# Patient Record
Sex: MALE | Race: WHITE | Employment: OTHER | URBAN - METROPOLITAN AREA
[De-identification: names, ages, dates, MRNs, and addresses within clinical notes are randomized per-mention and may not be internally consistent; named-entity substitution may affect disease eponyms.]

---

## 2017-02-21 ENCOUNTER — OFFICE VISIT (OUTPATIENT)
Dept: INTERNAL MEDICINE CLINIC | Facility: CLINIC | Age: 67
End: 2017-02-21

## 2017-02-21 VITALS
SYSTOLIC BLOOD PRESSURE: 111 MMHG | TEMPERATURE: 98 F | DIASTOLIC BLOOD PRESSURE: 72 MMHG | BODY MASS INDEX: 31.52 KG/M2 | HEIGHT: 68 IN | HEART RATE: 80 BPM | WEIGHT: 208 LBS

## 2017-02-21 DIAGNOSIS — E66.3 OVERWEIGHT: ICD-10-CM

## 2017-02-21 DIAGNOSIS — I10 ESSENTIAL HYPERTENSION: Primary | ICD-10-CM

## 2017-02-21 DIAGNOSIS — E78.2 MIXED HYPERLIPIDEMIA: ICD-10-CM

## 2017-02-21 PROCEDURE — G0463 HOSPITAL OUTPT CLINIC VISIT: HCPCS | Performed by: INTERNAL MEDICINE

## 2017-02-21 PROCEDURE — 99214 OFFICE O/P EST MOD 30 MIN: CPT | Performed by: INTERNAL MEDICINE

## 2017-02-21 NOTE — PROGRESS NOTES
Carlos Sandra is a 74year old male. HPI:   1. Essential hypertension with goal <140/90    Patient has been following low salt diet and has been taking anti-hypertensive prescriptions as prescribed.  Blood pressure has been checked and is under good control a Packs/Day: 1.00  Years: 35        Types: Cigarettes    Smokeless Status: Former User                       Comment: Quit in 2013    Alcohol Use: Yes           0.0 oz/week       0 Standard drinks or equivalent per week       Comment: 1-2 drinks       REVIE continue with life altering habits to improve weight and health and achieve BMI under 25.     Wt Readings from Last 6 Encounters:  02/21/17 : 208 lb (94.348 kg)  11/25/16 : 210 lb (95.255 kg)  11/17/16 : 208 lb 12.8 oz (94.711 kg)  11/10/16 : 210 lb (95.255

## 2017-06-20 ENCOUNTER — OFFICE VISIT (OUTPATIENT)
Dept: INTERNAL MEDICINE CLINIC | Facility: CLINIC | Age: 67
End: 2017-06-20

## 2017-06-20 VITALS
BODY MASS INDEX: 31.07 KG/M2 | WEIGHT: 205 LBS | DIASTOLIC BLOOD PRESSURE: 69 MMHG | HEIGHT: 68 IN | HEART RATE: 91 BPM | SYSTOLIC BLOOD PRESSURE: 102 MMHG | RESPIRATION RATE: 16 BRPM

## 2017-06-20 DIAGNOSIS — E66.3 OVERWEIGHT: ICD-10-CM

## 2017-06-20 DIAGNOSIS — Z12.5 SCREENING FOR PROSTATE CANCER: ICD-10-CM

## 2017-06-20 DIAGNOSIS — I10 ESSENTIAL HYPERTENSION WITH GOAL BLOOD PRESSURE LESS THAN 140/90: Primary | ICD-10-CM

## 2017-06-20 DIAGNOSIS — E78.2 MIXED HYPERLIPIDEMIA: ICD-10-CM

## 2017-06-20 PROCEDURE — 99214 OFFICE O/P EST MOD 30 MIN: CPT | Performed by: INTERNAL MEDICINE

## 2017-06-20 PROCEDURE — G0463 HOSPITAL OUTPT CLINIC VISIT: HCPCS | Performed by: INTERNAL MEDICINE

## 2017-06-20 NOTE — PROGRESS NOTES
Khloe Cifuentes is a 74year old male. HPI:   1. Essential hypertension with goal <140/90    Patient has been following low salt diet and has been taking anti-hypertensive prescriptions as prescribed.  Blood pressure has been checked and is under good control a (peripheral vascular disease) ([facility] [address]      per NG      Social History:    Smoking Status: Former Smoker                   Packs/Day: 1.00  Years: 35        Types: Cigarettes    Smokeless Status: Former User                       Comment: Quit in 2013    [place] weight. - Lipid Panel [E]; Future  - TSH [E]; Future    3. Overweight    Patient has been overweight for years. Has been attempting to eat less and exercise more to try and control weight.  Has seen some mild success and is encouraged to continue with li

## 2017-09-15 ENCOUNTER — LAB ENCOUNTER (OUTPATIENT)
Dept: LAB | Age: 67
End: 2017-09-15
Attending: INTERNAL MEDICINE
Payer: MEDICARE

## 2017-09-15 DIAGNOSIS — I10 ESSENTIAL HYPERTENSION WITH GOAL BLOOD PRESSURE LESS THAN 140/90: ICD-10-CM

## 2017-09-15 DIAGNOSIS — Z12.5 SCREENING FOR PROSTATE CANCER: ICD-10-CM

## 2017-09-15 DIAGNOSIS — E78.2 MIXED HYPERLIPIDEMIA: ICD-10-CM

## 2017-09-15 LAB
ALBUMIN SERPL BCP-MCNC: 3.8 G/DL (ref 3.5–4.8)
ALBUMIN/GLOB SERPL: 1.2 {RATIO} (ref 1–2)
ALP SERPL-CCNC: 51 U/L (ref 32–100)
ALT SERPL-CCNC: 21 U/L (ref 17–63)
ANION GAP SERPL CALC-SCNC: 7 MMOL/L (ref 0–18)
AST SERPL-CCNC: 18 U/L (ref 15–41)
BACTERIA UR QL AUTO: NEGATIVE /HPF
BASOPHILS # BLD: 0 K/UL (ref 0–0.2)
BASOPHILS NFR BLD: 0 %
BILIRUB SERPL-MCNC: 0.7 MG/DL (ref 0.3–1.2)
BILIRUB UR QL: NEGATIVE
BUN SERPL-MCNC: 13 MG/DL (ref 8–20)
BUN/CREAT SERPL: 14.4 (ref 10–20)
CALCIUM SERPL-MCNC: 8.8 MG/DL (ref 8.5–10.5)
CHLORIDE SERPL-SCNC: 104 MMOL/L (ref 95–110)
CHOLEST SERPL-MCNC: 78 MG/DL (ref 110–200)
CLARITY UR: CLEAR
CO2 SERPL-SCNC: 27 MMOL/L (ref 22–32)
COLOR UR: YELLOW
CREAT SERPL-MCNC: 0.9 MG/DL (ref 0.5–1.5)
EOSINOPHIL # BLD: 0.6 K/UL (ref 0–0.7)
EOSINOPHIL NFR BLD: 7 %
ERYTHROCYTE [DISTWIDTH] IN BLOOD BY AUTOMATED COUNT: 13.6 % (ref 11–15)
GLOBULIN PLAS-MCNC: 3.2 G/DL (ref 2.5–3.7)
GLUCOSE SERPL-MCNC: 98 MG/DL (ref 70–99)
GLUCOSE UR-MCNC: NEGATIVE MG/DL
HCT VFR BLD AUTO: 45.9 % (ref 41–52)
HDLC SERPL-MCNC: 35 MG/DL
HGB BLD-MCNC: 15.6 G/DL (ref 13.5–17.5)
HGB UR QL STRIP.AUTO: NEGATIVE
KETONES UR-MCNC: NEGATIVE MG/DL
LDLC SERPL CALC-MCNC: 28 MG/DL (ref 0–99)
LEUKOCYTE ESTERASE UR QL STRIP.AUTO: NEGATIVE
LYMPHOCYTES # BLD: 2.9 K/UL (ref 1–4)
LYMPHOCYTES NFR BLD: 32 %
MCH RBC QN AUTO: 32.5 PG (ref 27–32)
MCHC RBC AUTO-ENTMCNC: 34.1 G/DL (ref 32–37)
MCV RBC AUTO: 95.4 FL (ref 80–100)
MONOCYTES # BLD: 0.9 K/UL (ref 0–1)
MONOCYTES NFR BLD: 10 %
NEUTROPHILS # BLD AUTO: 4.7 K/UL (ref 1.8–7.7)
NEUTROPHILS NFR BLD: 51 %
NITRITE UR QL STRIP.AUTO: NEGATIVE
NONHDLC SERPL-MCNC: 43 MG/DL
OSMOLALITY UR CALC.SUM OF ELEC: 286 MOSM/KG (ref 275–295)
PH UR: 5 [PH] (ref 5–8)
PLATELET # BLD AUTO: 201 K/UL (ref 140–400)
PMV BLD AUTO: 7.1 FL (ref 7.4–10.3)
POTASSIUM SERPL-SCNC: 4 MMOL/L (ref 3.3–5.1)
PROT SERPL-MCNC: 7 G/DL (ref 5.9–8.4)
PROT UR-MCNC: NEGATIVE MG/DL
PSA SERPL-MCNC: 1 NG/ML (ref 0–4)
RBC # BLD AUTO: 4.81 M/UL (ref 4.5–5.9)
RBC #/AREA URNS AUTO: 2 /HPF
SODIUM SERPL-SCNC: 138 MMOL/L (ref 136–144)
SP GR UR STRIP: 1.02 (ref 1–1.03)
TRIGL SERPL-MCNC: 77 MG/DL (ref 1–149)
TSH SERPL-ACNC: 2.6 UIU/ML (ref 0.45–5.33)
UROBILINOGEN UR STRIP-ACNC: <2
VIT C UR-MCNC: 40 MG/DL
WBC # BLD AUTO: 9.1 K/UL (ref 4–11)
WBC #/AREA URNS AUTO: <1 /HPF

## 2017-09-15 PROCEDURE — 80053 COMPREHEN METABOLIC PANEL: CPT

## 2017-09-15 PROCEDURE — 85025 COMPLETE CBC W/AUTO DIFF WBC: CPT

## 2017-09-15 PROCEDURE — 36415 COLL VENOUS BLD VENIPUNCTURE: CPT

## 2017-09-15 PROCEDURE — 84443 ASSAY THYROID STIM HORMONE: CPT

## 2017-09-15 PROCEDURE — 80061 LIPID PANEL: CPT

## 2017-09-15 PROCEDURE — 81003 URINALYSIS AUTO W/O SCOPE: CPT

## 2017-10-13 ENCOUNTER — PATIENT OUTREACH (OUTPATIENT)
Dept: INTERNAL MEDICINE CLINIC | Facility: CLINIC | Age: 67
End: 2017-10-13

## 2017-10-17 ENCOUNTER — OFFICE VISIT (OUTPATIENT)
Dept: INTERNAL MEDICINE CLINIC | Facility: CLINIC | Age: 67
End: 2017-10-17

## 2017-10-17 VITALS
WEIGHT: 208 LBS | DIASTOLIC BLOOD PRESSURE: 67 MMHG | BODY MASS INDEX: 31.52 KG/M2 | RESPIRATION RATE: 16 BRPM | SYSTOLIC BLOOD PRESSURE: 103 MMHG | HEIGHT: 68 IN | HEART RATE: 90 BPM

## 2017-10-17 DIAGNOSIS — E66.3 OVERWEIGHT: ICD-10-CM

## 2017-10-17 DIAGNOSIS — E78.2 MIXED HYPERLIPIDEMIA: ICD-10-CM

## 2017-10-17 DIAGNOSIS — I10 ESSENTIAL HYPERTENSION WITH GOAL BLOOD PRESSURE LESS THAN 140/90: Primary | ICD-10-CM

## 2017-10-17 PROCEDURE — G0463 HOSPITAL OUTPT CLINIC VISIT: HCPCS | Performed by: INTERNAL MEDICINE

## 2017-10-17 PROCEDURE — 99214 OFFICE O/P EST MOD 30 MIN: CPT | Performed by: INTERNAL MEDICINE

## 2017-10-17 RX ORDER — SIMVASTATIN 20 MG
TABLET ORAL
Qty: 90 TABLET | Refills: 3 | Status: SHIPPED | OUTPATIENT
Start: 2017-10-17 | End: 2018-02-27

## 2017-10-17 NOTE — PROGRESS NOTES
Charline Varma is a 74year old male. HPI:   1. Essential hypertension with goal <140/90    Patient has been following low salt diet and has been taking anti-hypertensive prescriptions as prescribed.  Blood pressure has been checked and is under good control a per NG      Social History:  Smoking status: Former Smoker                                                              Packs/day: 1.00      Years: 35.00        Types: Cigarettes  Smokeless tobacco: Former User                     Comment: Quit in 2013 less and exercise more to try and control weight. Has seen some mild success and is encouraged to continue with life altering habits to improve weight and health and achieve BMI under 25.     Wt Readings from Last 6 Encounters:  10/17/17 : 208 lb (94.3 kg)

## 2017-10-18 ENCOUNTER — PATIENT OUTREACH (OUTPATIENT)
Dept: INTERNAL MEDICINE CLINIC | Facility: CLINIC | Age: 67
End: 2017-10-18

## 2017-10-18 NOTE — PROGRESS NOTES
Outreached to patient in regards to enrollment to Chronic Care Management program. Left message for patient to return my call at ext. [phone]. Thank you.

## 2017-10-20 ENCOUNTER — PATIENT OUTREACH (OUTPATIENT)
Dept: INTERNAL MEDICINE CLINIC | Facility: CLINIC | Age: 67
End: 2017-10-20

## 2017-10-20 NOTE — PROGRESS NOTES
Patient identified with a potential need for Chronic Care Management services. Called patient to introduce self and availability of Chronic Care Management services.   Patient informed about the following service elements:    - Health information sharing-

## 2017-10-23 ENCOUNTER — TELEPHONE (OUTPATIENT)
Dept: GASTROENTEROLOGY | Facility: CLINIC | Age: 67
End: 2017-10-23

## 2017-10-23 DIAGNOSIS — Z86.010 HX OF COLONIC POLYPS: Primary | ICD-10-CM

## 2017-10-23 NOTE — TELEPHONE ENCOUNTER
Last Procedure:  11/09/12  Last Diagnosis:  Status post polypectomy x1 small polyp spenic flexure, diverticular disease, internal hemorrhoids  Recalled for (years): 5 years  Sedation used previously:  IV  Last Prep Used (if known):    Quality of prep (if k

## 2017-10-23 NOTE — TELEPHONE ENCOUNTER
I have reviewed the patient's medical record, occasions, and recent visit with his PCP Dr. Sonia Smith.   It is okay to schedule a anoscopy, diagnosis history of colon polyps, split dose MiraLAX preparation, MAC or IV sedation

## 2017-10-23 NOTE — TELEPHONE ENCOUNTER
Pt called to schedule repeat CLN. Pt is aware of at least 72hr call back time.  Please call [phone] thank you

## 2017-10-24 ENCOUNTER — PATIENT OUTREACH (OUTPATIENT)
Dept: INTERNAL MEDICINE CLINIC | Facility: CLINIC | Age: 67
End: 2017-10-24

## 2017-10-24 NOTE — TELEPHONE ENCOUNTER
Scheduled for:  Colonoscopy - [address]  Provider Name:  Dr. Nilam Corcoran  Date:  11/8/17  Location:  [facility]  Sedation:  MAC  Time:  10:00 am (pt is aware to arrive at 9:00 am)  Prep:  Miralax/Gatorade, Prep instructions were given to pt over the phone, pt verbaliz

## 2017-10-25 ENCOUNTER — PATIENT OUTREACH (OUTPATIENT)
Dept: INTERNAL MEDICINE CLINIC | Facility: CLINIC | Age: 67
End: 2017-10-25

## 2017-10-25 DIAGNOSIS — M54.31 SCIATICA OF RIGHT SIDE: ICD-10-CM

## 2017-10-25 DIAGNOSIS — L71.9 ROSACEA: ICD-10-CM

## 2017-10-25 DIAGNOSIS — E66.3 OVERWEIGHT: ICD-10-CM

## 2017-10-25 DIAGNOSIS — E78.2 MIXED HYPERLIPIDEMIA: ICD-10-CM

## 2017-10-25 DIAGNOSIS — H02.839 DERMATOCHALASIS OF EYELID: ICD-10-CM

## 2017-10-25 DIAGNOSIS — I10 ESSENTIAL HYPERTENSION WITH GOAL BLOOD PRESSURE LESS THAN 140/90: ICD-10-CM

## 2017-10-25 DIAGNOSIS — Z96.1 PSEUDOPHAKIA OF BOTH EYES: ICD-10-CM

## 2017-10-25 DIAGNOSIS — I10 ESSENTIAL HYPERTENSION: ICD-10-CM

## 2017-10-25 DIAGNOSIS — Z98.49 STATUS POST CATARACT EXTRACTION, UNSPECIFIED LATERALITY: ICD-10-CM

## 2017-10-25 PROCEDURE — 99490 CHRNC CARE MGMT STAFF 1ST 20: CPT | Performed by: INTERNAL MEDICINE

## 2017-10-25 NOTE — PROGRESS NOTES
10/25/2017  Spoke to Giorgi moran at length about CCM, current care plan, performed CCM assessment, reviewed meds, and med compliance.  Reviewed/explained pt Patient Active Problem List:     Posterior capsule opacification     Pseudophakia of both eyes     Dermatoch leftovers from a friends house. Exercise : He walks 2-2.5 miles a day. Stress: He states the only stressors is what he sees on the news. Current Issues:    Hypertension: Controlled with medication. He does not pay attn to his salt intake.    Hyperlipid Reviewed and updated where needed   Sending education on: N/a  Time Spent This Encounter Total: 46 medical record review, telephone communication, assessment, care plan updates where needed, and education.   Provided acknowledgment and validation to patient

## 2017-11-06 RX ORDER — LISINOPRIL 10 MG/1
TABLET ORAL
Qty: 90 TABLET | Refills: 0 | Status: SHIPPED | OUTPATIENT
Start: 2017-11-06 | End: 2018-02-27

## 2017-11-06 NOTE — TELEPHONE ENCOUNTER
Signed Prescriptions Disp Refills    LISINOPRIL 10 MG Oral Tab 90 tablet 0      Sig: TAKE 1 TABLET BY MOUTH EVERY DAY        Authorizing Provider: PAZ Tejeda        Ordering User: Christian Mckeon           Refill approved per protocol.

## 2017-11-06 NOTE — TELEPHONE ENCOUNTER
Hypertensive Medications  Protocol Criteria:  · Appointment scheduled in the past 6 months or in the next 3 months  · BMP or CMP in the past 12 months  · Creatinine result < 2  Recent Outpatient Visits            2 weeks ago Essential hypertension with Reginald Fuchs

## 2017-11-08 ENCOUNTER — ANESTHESIA (OUTPATIENT)
Dept: ENDOSCOPY | Age: 67
End: 2017-11-08
Payer: MEDICARE

## 2017-11-08 ENCOUNTER — ANESTHESIA EVENT (OUTPATIENT)
Dept: ENDOSCOPY | Age: 67
End: 2017-11-08
Payer: MEDICARE

## 2017-11-08 ENCOUNTER — HOSPITAL ENCOUNTER (OUTPATIENT)
Age: 67
Setting detail: HOSPITAL OUTPATIENT SURGERY
Discharge: HOME | End: 2017-11-08
Attending: INTERNAL MEDICINE | Admitting: INTERNAL MEDICINE
Payer: MEDICARE

## 2017-11-08 ENCOUNTER — SURGERY (OUTPATIENT)
Age: 67
End: 2017-11-08

## 2017-11-08 VITALS
OXYGEN SATURATION: 96 % | HEART RATE: 77 BPM | TEMPERATURE: 99 F | BODY MASS INDEX: 30.36 KG/M2 | HEIGHT: 69 IN | RESPIRATION RATE: 18 BRPM | DIASTOLIC BLOOD PRESSURE: 75 MMHG | SYSTOLIC BLOOD PRESSURE: 123 MMHG | WEIGHT: 205 LBS

## 2017-11-08 DIAGNOSIS — Z86.010 HX OF COLONIC POLYPS: ICD-10-CM

## 2017-11-08 PROBLEM — K64.8 INTERNAL HEMORRHOIDS: Status: ACTIVE | Noted: 2017-11-08

## 2017-11-08 PROBLEM — K57.30 DIVERTICULOSIS LARGE INTESTINE W/O PERFORATION OR ABSCESS W/O BLEEDING: Status: ACTIVE | Noted: 2017-11-08

## 2017-11-08 PROBLEM — Z98.890 S/P COLONOSCOPIC POLYPECTOMY: Status: ACTIVE | Noted: 2017-11-08

## 2017-11-08 PROCEDURE — 45385 COLONOSCOPY W/LESION REMOVAL: CPT | Performed by: INTERNAL MEDICINE

## 2017-11-08 RX ORDER — NALOXONE HYDROCHLORIDE 0.4 MG/ML
80 INJECTION, SOLUTION INTRAMUSCULAR; INTRAVENOUS; SUBCUTANEOUS AS NEEDED
Status: CANCELLED | OUTPATIENT
Start: 2017-11-08 | End: 2017-11-08

## 2017-11-08 RX ORDER — SODIUM CHLORIDE, SODIUM LACTATE, POTASSIUM CHLORIDE, CALCIUM CHLORIDE 600; 310; 30; 20 MG/100ML; MG/100ML; MG/100ML; MG/100ML
INJECTION, SOLUTION INTRAVENOUS CONTINUOUS PRN
Status: DISCONTINUED | OUTPATIENT
Start: 2017-11-08 | End: 2017-11-08 | Stop reason: SURG

## 2017-11-08 RX ORDER — SODIUM CHLORIDE, SODIUM LACTATE, POTASSIUM CHLORIDE, CALCIUM CHLORIDE 600; 310; 30; 20 MG/100ML; MG/100ML; MG/100ML; MG/100ML
INJECTION, SOLUTION INTRAVENOUS CONTINUOUS
Status: CANCELLED | OUTPATIENT
Start: 2017-11-08

## 2017-11-08 RX ADMIN — SODIUM CHLORIDE, SODIUM LACTATE, POTASSIUM CHLORIDE, CALCIUM CHLORIDE: 600; 310; 30; 20 INJECTION, SOLUTION INTRAVENOUS at 10:05:00

## 2017-11-08 RX ADMIN — SODIUM CHLORIDE, SODIUM LACTATE, POTASSIUM CHLORIDE, CALCIUM CHLORIDE: 600; 310; 30; 20 INJECTION, SOLUTION INTRAVENOUS at 10:20:00

## 2017-11-08 NOTE — OPERATIVE REPORT
[facility]    PATIENT'S NAME: JOAQUÍN SALDANA   ATTENDING PHYSICIAN: Joey Hector MD   OPERATING PHYSICIAN: Joey Hector MD   PATIENT ACCOUNT#:   [id number]    LOCATION:  [address],[facility] 1 ENDO POOL ROOMS [place] [address] were scattered diverticula throughout the colon but more predominantly in the left hemicolon. Retroflexion in the rectum showed small internal hemorrhoids. The patient tolerated the procedure well without immediate complication. IMPRESSION:    1.    St

## 2017-11-08 NOTE — BRIEF OP NOTE
Pre-Operative Diagnosis: Hx of colonic polyps      Post-Operative Diagnosis: Status post polypectomy ×4, diverticulosis, internal hemorrhoids     Procedure Performed:   Procedure(s):  COLONOSCOPY with polypectomy ×4  Surgeon(s) and Role:     * [employer]

## 2017-11-08 NOTE — ANESTHESIA PREPROCEDURE EVALUATION
Anesthesia PreOp Note    HPI:     Mariusz Corcoran is a 74year old male who presents for preoperative consultation requested by: Santosh Meza MD    Date of Surgery: 11/8/2017    Procedure(s):  COLONOSCOPY  Indication: Hx of colonic polyps       H 11-: CATARACT EXTRACTION W/  INTRAOCULAR LENS IMPLA* Right      Comment: Phaco w/ PC IOL; per ROLANDO  11/1/2012: COLONOSCOPY      Comment: repeat in 2017 fall  10-: ELECTROCARDIOGRAM, COMPLETE      Comment: SCANNED TO MEDIA TAB: 10-  No da MCHC 34.1 09/15/2017   RDW 13.6 09/15/2017    09/15/2017   MPV 7.1 (L) 09/15/2017       Lab Results  Component Value Date    09/15/2017   K 4.0 09/15/2017    09/15/2017   CO2 27 09/15/2017   BUN 13 09/15/2017   CREATSERUM 0.90 09/15/2017

## 2017-11-08 NOTE — H&P
History & Physical Examination    Patient Name: Charline Varma  MRN: [id number]  [facility]: [id number]  YOB: 1950    Diagnosis: history of colon polyps      Prescriptions Prior to Admission:  LISINOPRIL 10 MG Oral Tab TAKE 1 TABLET BY MOUTH EVERY DAY SYSTEM Check if Physical Exam is Normal If not normal, please explain:   DYLAN [ Luciana Ahuja  [ Nicloe Boogie [ Patel Gomez [ Piedad Rise [ Niles Rios [ X]      I have discussed the risks and benefits and alternatives of the procedure with the

## 2017-11-08 NOTE — ANESTHESIA POSTPROCEDURE EVALUATION
Patient: Montse Martinez    Procedure Summary     Date:  11/08/17 Room / Location:  [facility] 01 / [address] [facility] 1 ENDO    Anesthesia Start:  1673 Anesthesia Stop:  0973    Procedure:  COLONOSCOPY (N/A ) Diagnosis:       Hx of colonic polyps      (polyps, diverticu

## 2017-11-13 ENCOUNTER — TELEPHONE (OUTPATIENT)
Dept: GASTROENTEROLOGY | Facility: CLINIC | Age: 67
End: 2017-11-13

## 2017-11-13 NOTE — TELEPHONE ENCOUNTER
Entered into EPIC:Recall colon in 3 years per Dr. Azam Lawrence. Last Colon done 11/8/2017, next due 11/8/2020. Snapshot updated. Letter mailed.

## 2017-11-15 ENCOUNTER — PATIENT OUTREACH (OUTPATIENT)
Dept: CASE MANAGEMENT | Age: 67
End: 2017-11-15

## 2017-11-15 NOTE — PROGRESS NOTES
1st home number was a fax machine, and left number to pt's listed mobile to please have him give me a call back at earliest convenience so that I might be able to give him my contact info and for us to perform his monthly CCM.  If pt calls back to main numb

## 2017-11-16 ENCOUNTER — PATIENT OUTREACH (OUTPATIENT)
Dept: CASE MANAGEMENT | Age: 67
End: 2017-11-16

## 2017-11-16 DIAGNOSIS — L71.9 ROSACEA: ICD-10-CM

## 2017-11-16 DIAGNOSIS — E66.3 OVERWEIGHT: ICD-10-CM

## 2017-11-16 DIAGNOSIS — I10 ESSENTIAL HYPERTENSION WITH GOAL BLOOD PRESSURE LESS THAN 140/90: ICD-10-CM

## 2017-11-16 DIAGNOSIS — Z98.890 S/P COLONOSCOPIC POLYPECTOMY: ICD-10-CM

## 2017-11-16 DIAGNOSIS — F32.0 MAJOR DEPRESSIVE DISORDER, SINGLE EPISODE, MILD (HCC): ICD-10-CM

## 2017-11-16 DIAGNOSIS — M54.31 SCIATICA OF RIGHT SIDE: ICD-10-CM

## 2017-11-16 PROCEDURE — 99490 CHRNC CARE MGMT STAFF 1ST 20: CPT | Performed by: INTERNAL MEDICINE

## 2017-11-16 NOTE — PROGRESS NOTES
Spoke to Giorgi moran at [employer] about CCM, HIPAA verified, current care plan and performed CCM assessment.   Patient Active Problem List:     Posterior capsule opacification     Pseudophakia of both eyes     Dermatochalasis of eyelid     MGD (meibomian gland dysfunc He admits to not checking his BP at home and he is aware that they medical professionals just lowered the threshold for BP to 130/80. He said he is pretty good at home and spot checks it when he is out.  We talked about how drinking more water can help flus especaily since he doesn't watch his sodium to see if that helps him to eliminate any excess water retention in his weight loss goals. Goal:Back Pain/ Shoulder pain to be evaluated. Plan:  He would like to wait until after the holidays if possible befo

## 2017-12-15 ENCOUNTER — PATIENT OUTREACH (OUTPATIENT)
Dept: CASE MANAGEMENT | Age: 67
End: 2017-12-15

## 2017-12-15 DIAGNOSIS — E78.2 MIXED HYPERLIPIDEMIA: ICD-10-CM

## 2017-12-15 DIAGNOSIS — F17.200 TOBACCO USE DISORDER: ICD-10-CM

## 2017-12-15 DIAGNOSIS — I10 ESSENTIAL HYPERTENSION WITH GOAL BLOOD PRESSURE LESS THAN 140/90: ICD-10-CM

## 2017-12-15 DIAGNOSIS — F32.0 MAJOR DEPRESSIVE DISORDER, SINGLE EPISODE, MILD (HCC): ICD-10-CM

## 2017-12-15 DIAGNOSIS — Z98.890 S/P COLONOSCOPIC POLYPECTOMY: ICD-10-CM

## 2017-12-15 DIAGNOSIS — E66.3 OVERWEIGHT: ICD-10-CM

## 2017-12-15 DIAGNOSIS — M54.31 SCIATICA OF RIGHT SIDE: ICD-10-CM

## 2017-12-15 PROCEDURE — 99490 CHRNC CARE MGMT STAFF 1ST 20: CPT | Performed by: INTERNAL MEDICINE

## 2017-12-15 NOTE — PROGRESS NOTES
Called to pt's cell. He was out at a resturant and asked that I call him back at 11AM to do monthly CCM at his home number as he would be back home by then.  P= Call pt at home at 11am.

## 2017-12-15 NOTE — PROGRESS NOTES
LVMTCB on pt's home number to ext [phone] . Will also call his cell. Attempting to reach pt to do monthly CCM. P= Await call back.

## 2017-12-15 NOTE — PROGRESS NOTES
Spoke to Giorgi moran at [employer] about CCM, HIPAA verified, current care plan and performed CCM assessment.   Patient Active Problem List:     Posterior capsule opacification     Pseudophakia of both eyes     Dermatochalasis of eyelid     MGD (meibomian gland dysfunc Nichole. His wife was gone 10 yrs. He will get together with his kids and grandkids a few days after x mas. He denies any feelings of suicidial ideations. He said \" nothing is that bad for me to think that way. \"     Meds: Detailed medication review wit communication, care plan updates where needed, and education. Provided acknowledgment and validation to patient's concerns.      Monthly Minute Total including today: 34       Physical assessment, complete health history, and need for CCM established by [facility]

## 2018-01-08 ENCOUNTER — PATIENT OUTREACH (OUTPATIENT)
Dept: CASE MANAGEMENT | Age: 68
End: 2018-01-08

## 2018-01-08 NOTE — PROGRESS NOTES
Left detailed VM for pt to call me back at his earliest convience for his monthly CCM. Also gave pt my detailed contact information and hours for his return call. P= Await call return.      Time Spent this call was 2 minutes

## 2018-01-09 ENCOUNTER — PATIENT OUTREACH (OUTPATIENT)
Dept: CASE MANAGEMENT | Age: 68
End: 2018-01-09

## 2018-01-09 DIAGNOSIS — E66.3 OVERWEIGHT: ICD-10-CM

## 2018-01-09 DIAGNOSIS — F32.0 MAJOR DEPRESSIVE DISORDER, SINGLE EPISODE, MILD (HCC): ICD-10-CM

## 2018-01-09 DIAGNOSIS — F17.200 TOBACCO USE DISORDER: ICD-10-CM

## 2018-01-09 DIAGNOSIS — M54.31 SCIATICA OF RIGHT SIDE: ICD-10-CM

## 2018-01-09 PROCEDURE — 99490 CHRNC CARE MGMT STAFF 1ST 20: CPT

## 2018-01-09 NOTE — PROGRESS NOTES
Pt returned call to my  while I was on the phone with another pt. Left message he was returning my CCM outreach call from earlier. P= Call pt. Back.

## 2018-01-09 NOTE — PROGRESS NOTES
Spoke to Giorgi moran at [employer] about CCM, HIPAA verified, current care plan and performed CCM assessment.   Patient Active Problem List:     Posterior capsule opacification     Pseudophakia of both eyes     Dermatochalasis of eyelid     MGD (meibomian gland dysfunc all things considering. He said the holidays were his wife's favoriate, and with her gone he said he thought it would be much worse, but he said it went ok. He had places to go and that made it much easier.      Overweight-  He has been walking outdoors wea your biggest concerns about your health? My weight. Still trying to lose weight  What steps do you think you could take to work on this? I will try to double my water intake daily to be 2-4 glasses a day over this next month.   I will start walking daily I

## 2018-02-13 ENCOUNTER — PATIENT OUTREACH (OUTPATIENT)
Dept: CASE MANAGEMENT | Age: 68
End: 2018-02-13

## 2018-02-13 DIAGNOSIS — F32.0 MAJOR DEPRESSIVE DISORDER, SINGLE EPISODE, MILD (HCC): ICD-10-CM

## 2018-02-13 DIAGNOSIS — E78.2 MIXED HYPERLIPIDEMIA: ICD-10-CM

## 2018-02-13 DIAGNOSIS — M54.31 SCIATICA OF RIGHT SIDE: ICD-10-CM

## 2018-02-13 DIAGNOSIS — I10 ESSENTIAL HYPERTENSION: ICD-10-CM

## 2018-02-13 DIAGNOSIS — E66.3 OVERWEIGHT: ICD-10-CM

## 2018-02-13 DIAGNOSIS — F17.200 TOBACCO USE DISORDER: ICD-10-CM

## 2018-02-13 PROCEDURE — 99490 CHRNC CARE MGMT STAFF 1ST 20: CPT

## 2018-02-13 NOTE — PROGRESS NOTES
Spoke to Krishna Sierra verified for CCM call.     Medical History  Patient Active Problem List:     Posterior capsule opacification     Pseudophakia of both eyes     Dermatochalasis of eyelid     MGD (meibomian gland dysfunction)     Chronic airway obstruction now walking in a mall which he still doesn't like, but he said if he didn't do that, he wouldn't be getting the exercise he has with the bad weather we have had. He did some shoveling, but the neighbor helped a lot with the .      HTN-= He said h help me in my meal planning and monitoring of my diet and assistance in increased water intake. Patient agrees to goal action plan.   Self-Management Abilities (patient reported)             (1) least confident in achieving goal to (5) very conf

## 2018-02-27 ENCOUNTER — OFFICE VISIT (OUTPATIENT)
Dept: INTERNAL MEDICINE CLINIC | Facility: CLINIC | Age: 68
End: 2018-02-27

## 2018-02-27 VITALS
RESPIRATION RATE: 16 BRPM | BODY MASS INDEX: 31.98 KG/M2 | SYSTOLIC BLOOD PRESSURE: 118 MMHG | HEIGHT: 68 IN | DIASTOLIC BLOOD PRESSURE: 69 MMHG | HEART RATE: 98 BPM | WEIGHT: 211 LBS

## 2018-02-27 DIAGNOSIS — E78.2 MIXED HYPERLIPIDEMIA: ICD-10-CM

## 2018-02-27 DIAGNOSIS — E66.3 OVERWEIGHT: ICD-10-CM

## 2018-02-27 DIAGNOSIS — I10 ESSENTIAL HYPERTENSION WITH GOAL BLOOD PRESSURE LESS THAN 140/90: Primary | ICD-10-CM

## 2018-02-27 PROCEDURE — G0463 HOSPITAL OUTPT CLINIC VISIT: HCPCS | Performed by: INTERNAL MEDICINE

## 2018-02-27 PROCEDURE — 99214 OFFICE O/P EST MOD 30 MIN: CPT | Performed by: INTERNAL MEDICINE

## 2018-02-27 RX ORDER — SIMVASTATIN 20 MG
TABLET ORAL
Qty: 90 TABLET | Refills: 3 | Status: SHIPPED | OUTPATIENT
Start: 2018-02-27 | End: 2019-01-29

## 2018-02-27 RX ORDER — LISINOPRIL 10 MG/1
10 TABLET ORAL
Qty: 90 TABLET | Refills: 3 | Status: SHIPPED | OUTPATIENT
Start: 2018-02-27 | End: 2019-01-29

## 2018-02-27 NOTE — PROGRESS NOTES
Lynnann Lefort is a 69year old male. HPI:   1. Essential hypertension with goal <140/90    Patient has been following low salt diet and has been taking anti-hypertensive prescriptions as prescribed.  Blood pressure has been checked and is under good control a OU; per NG   • Obesity, unspecified     per NG   • Other and unspecified hyperlipidemia     per NG   • P[facility]peripheral [facility]     per NG   • S/P colonoscopic polypectomy 11/8/2017      Social History:  Smoking status: Former Smoker exercise at least 3 times weekly to build strength, burn calories and help to achieve ideal body weight. 3. Overweight    Patient has been overweight for years. Has been attempting to eat less and exercise more to try and control weight.  Has seen some m

## 2018-03-15 ENCOUNTER — PATIENT OUTREACH (OUTPATIENT)
Dept: CASE MANAGEMENT | Age: 68
End: 2018-03-15

## 2018-03-15 NOTE — PROGRESS NOTES
Spoke with pt and he was willing to do the monthly outreach, but as I started to discuss his wife's 10 yr anniversary of her passing he said well actually that is today.  I told him I would call him on Monday or Tuesday and let him have this time with his s

## 2018-03-20 ENCOUNTER — PATIENT OUTREACH (OUTPATIENT)
Dept: CASE MANAGEMENT | Age: 68
End: 2018-03-20

## 2018-03-20 NOTE — PROGRESS NOTES
Left detailed VM with my detailed contact info for pt to call me at his earliest convenience so that we might complete his monthly ccm. P= Await call back.

## 2018-03-21 ENCOUNTER — PATIENT OUTREACH (OUTPATIENT)
Dept: CASE MANAGEMENT | Age: 68
End: 2018-03-21

## 2018-03-21 DIAGNOSIS — M54.31 SCIATICA OF RIGHT SIDE: ICD-10-CM

## 2018-03-21 DIAGNOSIS — F32.0 MAJOR DEPRESSIVE DISORDER, SINGLE EPISODE, MILD (HCC): ICD-10-CM

## 2018-03-21 DIAGNOSIS — F17.200 TOBACCO USE DISORDER: ICD-10-CM

## 2018-03-21 PROCEDURE — 99490 CHRNC CARE MGMT STAFF 1ST 20: CPT

## 2018-03-21 NOTE — PROGRESS NOTES
Pt called in tot my phone, but did not leave a VM. I was on a call with a pt at time. I called pt back this AM and left a detailed message for pt to please call me back at his earliest convenience. P= AWait call back.

## 2018-03-21 NOTE — PROGRESS NOTES
Spoke to Krishna Sierra verified for CCM call.     Medical History  Patient Active Problem List:     Posterior capsule opacification     Pseudophakia of both eyes     Dermatochalasis of eyelid     MGD (meibomian gland dysfunction)     Chronic airway obstruction yr period, meaning he wants to walk 300 days per year and so far he has walked every  With month for 2 miles.  He sometimes will break the 2 miles up if it is cold, or he has \"forced\" himself to go to the mall to walk, but he said he doesn't like to walk

## 2018-04-24 ENCOUNTER — PATIENT OUTREACH (OUTPATIENT)
Dept: CASE MANAGEMENT | Age: 68
End: 2018-04-24

## 2018-04-24 NOTE — PROGRESS NOTES
Left detailed VM with my detailed contact information for pt to call me at his earliest convenience so that we might complete his monthly ccm outreach.  P= AWait call return,

## 2018-04-27 ENCOUNTER — PATIENT OUTREACH (OUTPATIENT)
Dept: CASE MANAGEMENT | Age: 68
End: 2018-04-27

## 2018-04-27 NOTE — PROGRESS NOTES
Called and left detailed VM on his cell phone with my direct contact information after trying to call his home again today and no recorder came on, just a fax machine after ringing several times. P= await call return from pt.

## 2018-04-30 ENCOUNTER — PATIENT OUTREACH (OUTPATIENT)
Dept: CASE MANAGEMENT | Age: 68
End: 2018-04-30

## 2018-04-30 DIAGNOSIS — E66.3 PATIENT OVERWEIGHT: ICD-10-CM

## 2018-04-30 DIAGNOSIS — M54.31 SCIATICA OF RIGHT SIDE: ICD-10-CM

## 2018-04-30 DIAGNOSIS — F17.200 TOBACCO USE DISORDER: ICD-10-CM

## 2018-04-30 PROCEDURE — 99490 CHRNC CARE MGMT STAFF 1ST 20: CPT

## 2018-04-30 NOTE — PROGRESS NOTES
Spoke to Krishna Sierra verified for CCM call.     Medical History  Patient Active Problem List:     Posterior capsule opacification     Pseudophakia of both eyes     Dermatochalasis of eyelid     MGD (meibomian gland dysfunction)     Chronic airway obstruction completion of his chiropracter. Overweight- Pt stated that he has stayed about the same in his weight. He said he hates to walk indoors, but has \" forced\" himself to do some with the less than ideal weather we have had the last several months.  He dustin needed, and education. Provided acknowledgment and validation to patient's concerns.      Monthly Minute Total including today: 22       Physical assessment, complete health history, and need for CCM established by Janey Del Cid MD.

## 2018-05-06 ENCOUNTER — HOSPITAL ENCOUNTER (EMERGENCY)
Facility: HOSPITAL | Age: 68
Discharge: HOME | End: 2018-05-06
Attending: EMERGENCY MEDICINE
Payer: MEDICARE

## 2018-05-06 ENCOUNTER — APPOINTMENT (OUTPATIENT)
Dept: GENERAL RADIOLOGY | Facility: HOSPITAL | Age: 68
End: 2018-05-06
Attending: EMERGENCY MEDICINE
Payer: MEDICARE

## 2018-05-06 VITALS
DIASTOLIC BLOOD PRESSURE: 84 MMHG | SYSTOLIC BLOOD PRESSURE: 140 MMHG | HEART RATE: 79 BPM | RESPIRATION RATE: 18 BRPM | HEIGHT: 68 IN | WEIGHT: 210 LBS | OXYGEN SATURATION: 96 % | TEMPERATURE: 98 F | BODY MASS INDEX: 31.83 KG/M2

## 2018-05-06 DIAGNOSIS — M54.32 SCIATICA OF LEFT SIDE: Primary | ICD-10-CM

## 2018-05-06 PROCEDURE — 81001 URINALYSIS AUTO W/SCOPE: CPT | Performed by: EMERGENCY MEDICINE

## 2018-05-06 PROCEDURE — 99283 EMERGENCY DEPT VISIT LOW MDM: CPT

## 2018-05-06 PROCEDURE — 72100 X-RAY EXAM L-S SPINE 2/3 VWS: CPT | Performed by: EMERGENCY MEDICINE

## 2018-05-06 RX ORDER — DIAZEPAM 2 MG/1
2 TABLET ORAL 3 TIMES DAILY PRN
Qty: 20 TABLET | Refills: 0 | Status: SHIPPED | OUTPATIENT
Start: 2018-05-06 | End: 2018-05-13

## 2018-05-06 RX ORDER — HYDROCODONE BITARTRATE AND ACETAMINOPHEN 5; 325 MG/1; MG/1
1-2 TABLET ORAL EVERY 6 HOURS PRN
Qty: 16 TABLET | Refills: 0 | Status: SHIPPED | OUTPATIENT
Start: 2018-05-06 | End: 2018-05-13

## 2018-05-06 RX ORDER — METHYLPREDNISOLONE 4 MG/1
TABLET ORAL
Qty: 1 PACKAGE | Refills: 0 | Status: SHIPPED | OUTPATIENT
Start: 2018-05-06 | End: 2018-05-11

## 2018-05-06 RX ORDER — HYDROCODONE BITARTRATE AND ACETAMINOPHEN 5; 325 MG/1; MG/1
1 TABLET ORAL ONCE
Status: COMPLETED | OUTPATIENT
Start: 2018-05-06 | End: 2018-05-06

## 2018-05-06 NOTE — ED NOTES
Rec'd patient sitting on cart with complaints of chronic lower back with complaints of increasing pain over the past week associated with radiation of pain down left leg. States has been taking Motrin or Aleve with no relief in pain.   States pain has been

## 2018-05-06 NOTE — ED INITIAL ASSESSMENT (HPI)
Patient presents with chronic lower back pain that radiates down the left leg. Patient states pain has recently got much worse.

## 2018-05-06 NOTE — ED PROVIDER NOTES
Patient Seen in: [facility] Emergency Department    History   Patient presents with:  Back Pain (musculoskeletal)    Stated Complaint: Low back pain     HPI    She presents emergency department complaining of acute exacerbation of chronic low back 35.00        Types: Cigarettes  Smokeless tobacco: Former User                     Comment: Quit in 2013  Alcohol use: Yes           0.0 oz/week     Comment: 1-2 drinks      Review of Systems    Positive for stated complaint: Low back pain   Other systems 1850  ------------------------------------------------------------      [facility]      Pulse Ox: 97%, Normal,     Radiology findings: Xr Lumbar Spine (min 2 Views) (cpt=72100)    Result Date: 5/6/2018  CONCLUSION:  1. No significant fracture.  Multilevel spondylo

## 2018-05-07 NOTE — ED NOTES
Patient discharged home with friend with written/verbal discharge instructions which patient verbalizes understanding. Gait steady.

## 2018-05-15 ENCOUNTER — OFFICE VISIT (OUTPATIENT)
Dept: INTERNAL MEDICINE CLINIC | Facility: CLINIC | Age: 68
End: 2018-05-15

## 2018-05-15 VITALS
BODY MASS INDEX: 31.1 KG/M2 | SYSTOLIC BLOOD PRESSURE: 123 MMHG | DIASTOLIC BLOOD PRESSURE: 81 MMHG | HEART RATE: 96 BPM | RESPIRATION RATE: 16 BRPM | HEIGHT: 69 IN | WEIGHT: 210 LBS

## 2018-05-15 DIAGNOSIS — E78.2 MIXED HYPERLIPIDEMIA: ICD-10-CM

## 2018-05-15 DIAGNOSIS — I10 ESSENTIAL HYPERTENSION WITH GOAL BLOOD PRESSURE LESS THAN 140/90: ICD-10-CM

## 2018-05-15 DIAGNOSIS — E66.09 CLASS 1 OBESITY DUE TO EXCESS CALORIES WITHOUT SERIOUS COMORBIDITY WITH BODY MASS INDEX (BMI) OF 31.0 TO 31.9 IN ADULT: ICD-10-CM

## 2018-05-15 DIAGNOSIS — M54.42 ACUTE MIDLINE LOW BACK PAIN WITH LEFT-SIDED SCIATICA: Primary | ICD-10-CM

## 2018-05-15 PROCEDURE — 99214 OFFICE O/P EST MOD 30 MIN: CPT | Performed by: INTERNAL MEDICINE

## 2018-05-15 PROCEDURE — 99212 OFFICE O/P EST SF 10 MIN: CPT | Performed by: INTERNAL MEDICINE

## 2018-05-15 RX ORDER — TRAMADOL HYDROCHLORIDE 50 MG/1
50 TABLET ORAL EVERY 6 HOURS PRN
Qty: 60 TABLET | Refills: 1 | Status: SHIPPED | OUTPATIENT
Start: 2018-05-15 | End: 2018-08-13 | Stop reason: ALTCHOICE

## 2018-05-15 NOTE — PROGRESS NOTES
Camila Michele is a 69year old male. HPI:   1. Low back Pain    Has been having pain in his left lower back that shoots down his leg to the left foot. Pain can get to a 10/10 in intensity in bed or when he stretches. Walking seems to help the pain somewhat. by ORAL route  every day Disp:  Rfl:       Past Medical History:   Diagnosis Date   • Cataract    • Diverticulosis large intestine w/o perforation or abscess w/o bleeding 11/8/2017   • High blood pressure    • High cholesterol    • History of tonsillitis spasm use moist heat for short 10-15 minute intervals after the first 24 hours after pain started. Try to do some janey's flexion exercises 3-4 times daily to improve stretching and decrease muscle spasm and pain.  If narcotics have been ordered use them

## 2018-05-17 ENCOUNTER — PATIENT OUTREACH (OUTPATIENT)
Dept: CASE MANAGEMENT | Age: 68
End: 2018-05-17

## 2018-05-17 DIAGNOSIS — F32.0 MAJOR DEPRESSIVE DISORDER, SINGLE EPISODE, MILD (HCC): ICD-10-CM

## 2018-05-17 DIAGNOSIS — M54.31 SCIATICA OF RIGHT SIDE: ICD-10-CM

## 2018-05-17 PROCEDURE — 99490 CHRNC CARE MGMT STAFF 1ST 20: CPT

## 2018-05-17 NOTE — PROGRESS NOTES
Spoke to Krishna Sierra verified for CCM call.     Medical History  Patient Active Problem List:     Posterior capsule opacification     Pseudophakia of both eyes     Dermatochalasis of eyelid     MGD (meibomian gland dysfunction)     Chronic airway obstruction June 12th and pt was so excited to hear this. He is aware he is on a cancellation list now as well and should someone cancel then they will call him. He was then called given an appt for this Tuesday  May 22nd. He was so happy about this.  He said if he wal 4.5        Care Manager Follow Up: ONE month    Reason For Follow Up: review progress and or barriers towards patients goals.      Care managers interventions: see above    Time Spent This Encounter Total: 29min medical record review, telephone communicatio

## 2018-05-22 ENCOUNTER — OFFICE VISIT (OUTPATIENT)
Dept: NEUROLOGY | Facility: CLINIC | Age: 68
End: 2018-05-22

## 2018-05-22 ENCOUNTER — TELEPHONE (OUTPATIENT)
Dept: NEUROLOGY | Facility: CLINIC | Age: 68
End: 2018-05-22

## 2018-05-22 ENCOUNTER — HOSPITAL ENCOUNTER (OUTPATIENT)
Dept: MRI IMAGING | Facility: HOSPITAL | Age: 68
Discharge: HOME | End: 2018-05-22
Attending: PHYSICAL MEDICINE & REHABILITATION
Payer: MEDICARE

## 2018-05-22 VITALS
RESPIRATION RATE: 16 BRPM | DIASTOLIC BLOOD PRESSURE: 62 MMHG | HEIGHT: 69 IN | HEART RATE: 64 BPM | BODY MASS INDEX: 31.1 KG/M2 | WEIGHT: 210 LBS | SYSTOLIC BLOOD PRESSURE: 110 MMHG

## 2018-05-22 DIAGNOSIS — G89.29 CHRONIC LEFT-SIDED LOW BACK PAIN WITH LEFT-SIDED SCIATICA: ICD-10-CM

## 2018-05-22 DIAGNOSIS — G89.29 CHRONIC LEFT-SIDED LOW BACK PAIN WITH LEFT-SIDED SCIATICA: Primary | ICD-10-CM

## 2018-05-22 DIAGNOSIS — M54.42 CHRONIC LEFT-SIDED LOW BACK PAIN WITH LEFT-SIDED SCIATICA: ICD-10-CM

## 2018-05-22 DIAGNOSIS — M54.42 CHRONIC LEFT-SIDED LOW BACK PAIN WITH LEFT-SIDED SCIATICA: Primary | ICD-10-CM

## 2018-05-22 PROCEDURE — 99204 OFFICE O/P NEW MOD 45 MIN: CPT | Performed by: PHYSICAL MEDICINE & REHABILITATION

## 2018-05-22 PROCEDURE — 72148 MRI LUMBAR SPINE W/O DYE: CPT | Performed by: PHYSICAL MEDICINE & REHABILITATION

## 2018-05-22 RX ORDER — HYDROCODONE BITARTRATE AND ACETAMINOPHEN 10; 325 MG/1; MG/1
1 TABLET ORAL 2 TIMES DAILY PRN
Qty: 30 TABLET | Refills: 0 | Status: SHIPPED | OUTPATIENT
Start: 2018-05-22 | End: 2018-07-06 | Stop reason: ALTCHOICE

## 2018-05-22 NOTE — TELEPHONE ENCOUNTER
Pt. informed insurance was verified and MRI L-spine wo is a covered benefit and does not require authorization. Scheduled MRI to day at [facility].

## 2018-05-22 NOTE — H&P
[facility] [address], [place] [address], [address], [facility]    History and Physical    Luis Goods Patient Status:  No patient class for patient encounter    1950 MRN [id number]   Location [facility] [address], [place] [address],  ER visit he was seen by his primary care physician who prescribed him tramadol 50 mg for pain. He takes 1-2 tabs, but the medication has not been helpful. He has no previous history of physical therapy, lumbosacral injections or surgery.     Location of p COLONOSCOPY;  Surgeon: Ranjana Kyle MD;  Location: [facility] ENDO  10-: ELECTROCARDIOGRAM, COMPLETE      Comment: SCANNED TO MEDIA TAB: 10-  No date: TONSILLECTOMY      Comment: per ROLANDO  Family History   Problem Relati sounds are normal.   Musculoskeletal:   Lumbar range of motion: Lumbar flexion to 60° without pain. Lumbar extension to 0° without pain.     Palpation: No pain with palpation of the paravertebral musculature, spinous processes, or SI joints bilaterally to extension lumbar stabilization. MRI of the L-spine ordered for further evaluation. May continue tramadol 50mg as prescribed, norco 10/325 #30 BID PRN severe pain given. He will follow up with me in 2-3 weeks.  If he is still having pain and is not improv

## 2018-05-22 NOTE — PATIENT INSTRUCTIONS
As of October 6th 2014, the Drug Enforcement Agency [facility]) is reclassifying all hydrocodone combination medications from Schedule III to Schedule II. This includes medications such as Norco, Vicodin, Lortab, Zohydro, and Vicoprofen.     What this means for y

## 2018-05-23 ENCOUNTER — TELEPHONE (OUTPATIENT)
Dept: NEUROLOGY | Facility: CLINIC | Age: 68
End: 2018-05-23

## 2018-05-23 NOTE — TELEPHONE ENCOUNTER
Spoke to patient and gave him information from Dr Author Loop note 5/23/18. Patient expressed understanding. PT is scheduled. Transferred to [facility] to make NOV with Dr Richard Mckeon. LOV 5/22/18.

## 2018-05-23 NOTE — TELEPHONE ENCOUNTER
----- Message from Robles Ontiveros DO sent at 5/23/2018 11:09 AM CDT -----  Please let the patient know that I've reviewed the MRI results - he has a lot of narrowing (stenosis) at two levels of the lumbar spine and a small cyst at one level that narrows th

## 2018-05-23 NOTE — PROGRESS NOTES
Please let the patient know that I've reviewed the MRI results - he has a lot of narrowing (stenosis) at two levels of the lumbar spine and a small cyst at one level that narrows the space for the nerves to go through. The cyst is small.  He should continue

## 2018-06-04 ENCOUNTER — OFFICE VISIT (OUTPATIENT)
Dept: PHYSICAL THERAPY | Facility: HOSPITAL | Age: 68
End: 2018-06-04
Attending: PHYSICAL MEDICINE & REHABILITATION
Payer: MEDICARE

## 2018-06-04 DIAGNOSIS — G89.29 CHRONIC LEFT-SIDED LOW BACK PAIN WITH LEFT-SIDED SCIATICA: ICD-10-CM

## 2018-06-04 DIAGNOSIS — M54.42 CHRONIC LEFT-SIDED LOW BACK PAIN WITH LEFT-SIDED SCIATICA: ICD-10-CM

## 2018-06-04 PROCEDURE — 97140 MANUAL THERAPY 1/> REGIONS: CPT

## 2018-06-04 PROCEDURE — 97110 THERAPEUTIC EXERCISES: CPT

## 2018-06-04 PROCEDURE — 97161 PT EVAL LOW COMPLEX 20 MIN: CPT

## 2018-06-04 NOTE — PROGRESS NOTES
LUMBAR SPINE EVALUATION:   Referring Physician: Dr. Tosin Mendoza  Date of Onset: May 6, 2018 Date of Service: 6/4/2018   Diagnosis: chronic low back pain with radiation down the left leg, loss of S1 reflex  PATIENT SUMMARY:   Galen Dumont is a 69year old y/o and rounded shoulders  Gait: AGMR R hip, decreased hip ext bilat, L side bend at mid stance, decreased L stance, limited arm swings and thoracic rotation.   ROM:     Trunk         Pain (+/-)   Flexion Fingertips to mid shin                     -   Extension visits)    Frequency / Duration: Patient will be seen for 2x/week or a total of 8 visits over a 90 day period. Treatment will include: Manual Therapy, Neuromuscular Re-education, Therapeutic Exercise and Home Exercise Program instruction.      Education or

## 2018-06-07 ENCOUNTER — OFFICE VISIT (OUTPATIENT)
Dept: PHYSICAL THERAPY | Facility: HOSPITAL | Age: 68
End: 2018-06-07
Attending: PHYSICAL MEDICINE & REHABILITATION
Payer: MEDICARE

## 2018-06-07 DIAGNOSIS — M54.42 CHRONIC LEFT-SIDED LOW BACK PAIN WITH LEFT-SIDED SCIATICA: ICD-10-CM

## 2018-06-07 DIAGNOSIS — G89.29 CHRONIC LEFT-SIDED LOW BACK PAIN WITH LEFT-SIDED SCIATICA: ICD-10-CM

## 2018-06-07 PROCEDURE — 97110 THERAPEUTIC EXERCISES: CPT

## 2018-06-07 PROCEDURE — 97140 MANUAL THERAPY 1/> REGIONS: CPT

## 2018-06-07 NOTE — PROGRESS NOTES
Diagnosis: chronic low back pain with radiation down the left leg, loss of S1 reflex  Authorized # of Visits:  8         Next MD visit: none scheduled  Fall Risk: standard         Precautions: n/a           Medication Changes since last visit?: No  Subject

## 2018-06-11 ENCOUNTER — OFFICE VISIT (OUTPATIENT)
Dept: PHYSICAL THERAPY | Facility: HOSPITAL | Age: 68
End: 2018-06-11
Attending: PHYSICAL MEDICINE & REHABILITATION
Payer: MEDICARE

## 2018-06-11 DIAGNOSIS — G89.29 CHRONIC LEFT-SIDED LOW BACK PAIN WITH LEFT-SIDED SCIATICA: ICD-10-CM

## 2018-06-11 DIAGNOSIS — M54.42 CHRONIC LEFT-SIDED LOW BACK PAIN WITH LEFT-SIDED SCIATICA: ICD-10-CM

## 2018-06-11 PROCEDURE — 97140 MANUAL THERAPY 1/> REGIONS: CPT

## 2018-06-11 PROCEDURE — 97110 THERAPEUTIC EXERCISES: CPT

## 2018-06-11 NOTE — PROGRESS NOTES
Diagnosis: chronic low back pain with radiation down the left leg, loss of S1 reflex  Authorized # of Visits:  8         Next MD visit: none scheduled  Fall Risk: standard         Precautions: n/a           Medication Changes since last visit?: No  Subject functional improvement. (8 visits)  [place] with HEP. (8 visits)    Plan: continue progression of manual therapy to improve lower lumbar and thoracic mobility with core stability to minimize lumbar N root irritation.  ADD STANDING HIP EXT IN SUBSEQUENT

## 2018-06-14 ENCOUNTER — OFFICE VISIT (OUTPATIENT)
Dept: PHYSICAL THERAPY | Facility: HOSPITAL | Age: 68
End: 2018-06-14
Attending: PHYSICAL MEDICINE & REHABILITATION
Payer: MEDICARE

## 2018-06-14 DIAGNOSIS — G89.29 CHRONIC LEFT-SIDED LOW BACK PAIN WITH LEFT-SIDED SCIATICA: ICD-10-CM

## 2018-06-14 DIAGNOSIS — M54.42 CHRONIC LEFT-SIDED LOW BACK PAIN WITH LEFT-SIDED SCIATICA: ICD-10-CM

## 2018-06-14 PROCEDURE — 97110 THERAPEUTIC EXERCISES: CPT

## 2018-06-14 NOTE — PROGRESS NOTES
Diagnosis: chronic low back pain with radiation down the left leg, loss of S1 reflex  Authorized # of Visits:  8         Next MD visit: none scheduled  Fall Risk: standard         Precautions: n/a           Medication Changes since last visit?: No  Subject requiring less frequent breaks. Goals:   (4 wks)  Improve standing tolerance up to 1 hr to be able to do chores at home or show homes as a .  (8 visits)  Be able to walk up to 45 min prior with max 3/10 pain in low back and LLE pain to b

## 2018-06-18 ENCOUNTER — OFFICE VISIT (OUTPATIENT)
Dept: PHYSICAL THERAPY | Facility: HOSPITAL | Age: 68
End: 2018-06-18
Attending: PHYSICAL MEDICINE & REHABILITATION
Payer: MEDICARE

## 2018-06-18 DIAGNOSIS — M54.42 CHRONIC LEFT-SIDED LOW BACK PAIN WITH LEFT-SIDED SCIATICA: ICD-10-CM

## 2018-06-18 DIAGNOSIS — G89.29 CHRONIC LEFT-SIDED LOW BACK PAIN WITH LEFT-SIDED SCIATICA: ICD-10-CM

## 2018-06-18 PROCEDURE — 97140 MANUAL THERAPY 1/> REGIONS: CPT

## 2018-06-18 PROCEDURE — 97110 THERAPEUTIC EXERCISES: CPT

## 2018-06-18 NOTE — PROGRESS NOTES
Diagnosis: chronic low back pain with radiation down the left leg, loss of S1 reflex  Authorized # of Visits:  8         Next MD visit: none scheduled  Fall Risk: standard         Precautions: n/a           Medication Changes since last visit?: No  Subject with tactile cues  EX  -Sciatic N glides supine x15 bilat (HOME))  -Open books x15 bilat  -BKFO x2 min bilat  -Standing hip abd YTB 2x10 bilat  -Standing hip ext YTB 2x10 bilat  -Tandem stance pull down 2x10 hold 3 sec GTB  -Bridging 2x10 hold 3 sec.    HEP

## 2018-06-19 ENCOUNTER — PATIENT OUTREACH (OUTPATIENT)
Dept: CASE MANAGEMENT | Age: 68
End: 2018-06-19

## 2018-06-19 DIAGNOSIS — M54.31 SCIATICA OF RIGHT SIDE: ICD-10-CM

## 2018-06-19 DIAGNOSIS — F32.0 MAJOR DEPRESSIVE DISORDER, SINGLE EPISODE, MILD (HCC): ICD-10-CM

## 2018-06-19 NOTE — PROGRESS NOTES
Spoke to Krishna Sierra verified for CCM call.     Medical History  Patient Active Problem List:     Posterior capsule opacification     Pseudophakia of both eyes     Dermatochalasis of eyelid     MGD (meibomian gland dysfunction)     Chronic airway obstruction side effects and I encouraged him to monitor for constipation. He said that has happened to him a few times and he said has been mindful of this. I explained that him straining can be painful to the back region. He stated understanding.  He has no other iss remaining therapy session nights and then 50 mg the other nights and gradually try to wean down from there. Patient agrees to goal action plan.   Self-Management Abilities (patient reported)             (1) least confident in achieving goal to (5

## 2018-06-21 ENCOUNTER — OFFICE VISIT (OUTPATIENT)
Dept: PHYSICAL THERAPY | Facility: HOSPITAL | Age: 68
End: 2018-06-21
Attending: PHYSICAL MEDICINE & REHABILITATION
Payer: MEDICARE

## 2018-06-21 DIAGNOSIS — G89.29 CHRONIC LEFT-SIDED LOW BACK PAIN WITH LEFT-SIDED SCIATICA: ICD-10-CM

## 2018-06-21 DIAGNOSIS — M54.42 CHRONIC LEFT-SIDED LOW BACK PAIN WITH LEFT-SIDED SCIATICA: ICD-10-CM

## 2018-06-21 PROCEDURE — 97140 MANUAL THERAPY 1/> REGIONS: CPT

## 2018-06-21 PROCEDURE — 97110 THERAPEUTIC EXERCISES: CPT

## 2018-06-21 NOTE — PROGRESS NOTES
Diagnosis: chronic low back pain with radiation down the left leg, loss of S1 reflex  Authorized # of Visits:  8         Next MD visit: none scheduled  Fall Risk: standard         Precautions: n/a           Medication Changes since last visit?: No  Subject -Supine sciatic N glides x 15 r/l  -Standing hip ext YTB 3 x 10 r/l  -Standing hip abd YTB 3 x 10 r/l   -Prone alt knee flex with abd brace x 15 with tactile cues  EX  -Sciatic N glides supine x15 bilat (HOME))  -Open books x15 bilat  -BKFO x2 min bilat

## 2018-06-25 ENCOUNTER — OFFICE VISIT (OUTPATIENT)
Dept: PHYSICAL THERAPY | Facility: HOSPITAL | Age: 68
End: 2018-06-25
Attending: PHYSICAL MEDICINE & REHABILITATION
Payer: MEDICARE

## 2018-06-25 DIAGNOSIS — G89.29 CHRONIC LEFT-SIDED LOW BACK PAIN WITH LEFT-SIDED SCIATICA: ICD-10-CM

## 2018-06-25 DIAGNOSIS — M54.42 CHRONIC LEFT-SIDED LOW BACK PAIN WITH LEFT-SIDED SCIATICA: ICD-10-CM

## 2018-06-25 PROCEDURE — 97110 THERAPEUTIC EXERCISES: CPT

## 2018-06-25 PROCEDURE — 97140 MANUAL THERAPY 1/> REGIONS: CPT

## 2018-06-25 NOTE — PROGRESS NOTES
Diagnosis: chronic low back pain with radiation down the left leg, loss of S1 reflex  Authorized # of Visits:  8         Next MD visit: none scheduled  Fall Risk: standard         Precautions: n/a           Medication Changes since last visit?: No  Subject YTB 3x10 bilat  -Tandem stance pull down 2x10 hold 3 sec GTB TherEx:   -Quadruped rocking x 25  -Open books x 20 r/l  -Seated thoracic ext ball at chair 2 x 10  -PPT with TA brace 10\" hold x 15-corrected   -Supine sciatic N glides x 15 r/l  -Standing hip mobility with core stability to minimize lumbar N root irritation. ADD RESISTED GAIT IN SUBSEQUENT SESSIONS.   Charges: MT 2, EX 1 Total Timed Treatment:  MT 25 min, EX 15 min Total Treatment Time: 45 min

## 2018-06-28 ENCOUNTER — OFFICE VISIT (OUTPATIENT)
Dept: PHYSICAL THERAPY | Facility: HOSPITAL | Age: 68
End: 2018-06-28
Attending: PHYSICAL MEDICINE & REHABILITATION
Payer: MEDICARE

## 2018-06-28 DIAGNOSIS — G89.29 CHRONIC LEFT-SIDED LOW BACK PAIN WITH LEFT-SIDED SCIATICA: ICD-10-CM

## 2018-06-28 DIAGNOSIS — M54.42 CHRONIC LEFT-SIDED LOW BACK PAIN WITH LEFT-SIDED SCIATICA: ICD-10-CM

## 2018-06-28 PROCEDURE — 97110 THERAPEUTIC EXERCISES: CPT

## 2018-06-28 PROCEDURE — 97140 MANUAL THERAPY 1/> REGIONS: CPT

## 2018-06-28 NOTE — PROGRESS NOTES
Patient Name: Nguyễn Hasten: 12/14/1950, MRN: [id number]   Date:  6/28/2018  Referring Physician:  Keely Baeza    Diagnosis: chronic low back pain with radiation down the left leg, loss of S1 reflex    Discharge Summary  Pt has attended 8, cancelled 1%-19% impaired, limited, or restricted    Goals:   (4 wks)  Improve standing tolerance up to 1 hr to be able to do chores at home or show homes as a . (MET)  Be able to walk up to 45 min prior with max 3/10 pain in low back and LLE pain t

## 2018-06-30 PROCEDURE — 99490 CHRNC CARE MGMT STAFF 1ST 20: CPT

## 2018-07-06 ENCOUNTER — OFFICE VISIT (OUTPATIENT)
Dept: NEUROLOGY | Facility: CLINIC | Age: 68
End: 2018-07-06

## 2018-07-06 VITALS — RESPIRATION RATE: 17 BRPM | DIASTOLIC BLOOD PRESSURE: 62 MMHG | HEART RATE: 84 BPM | SYSTOLIC BLOOD PRESSURE: 138 MMHG

## 2018-07-06 DIAGNOSIS — M48.061 LUMBAR STENOSIS WITHOUT NEUROGENIC CLAUDICATION: ICD-10-CM

## 2018-07-06 DIAGNOSIS — M71.38 SYNOVIAL CYST OF LUMBAR FACET JOINT: Primary | ICD-10-CM

## 2018-07-06 PROCEDURE — 99213 OFFICE O/P EST LOW 20 MIN: CPT | Performed by: PHYSICAL MEDICINE & REHABILITATION

## 2018-07-06 RX ORDER — GABAPENTIN 100 MG/1
CAPSULE ORAL
Qty: 90 CAPSULE | Refills: 0 | Status: SHIPPED | OUTPATIENT
Start: 2018-07-06 | End: 2018-08-13 | Stop reason: ALTCHOICE

## 2018-07-06 NOTE — PATIENT INSTRUCTIONS
1) Take gabapentin 100mg capsules. Start with twice daily dosing for 1 week. If no side effects increase to three times daily dosing. Tramadol and gabapentin together may make you tired.     2) If you continue to have symptoms 1 month from now call my offic substances: Written prescriptions  · Written prescriptions must be picked up in office. · Please allow the office 48-72 hours to fill the prescription as our physicians rotate between multiple offices and procedure days in the hospitals.   · Patient must p

## 2018-07-06 NOTE — PROGRESS NOTES
HPI:    Patient ID: Edson Diamond is a 69year old male. 64-year-old male presents for follow-up of left-sided low back pain radiating down the lateral thigh to the ankle. He has completed physical therapy and had significant reduction in his pain.   He co to 60° with no pain. Lumbar extension to 5 degrees with no pain. Palpation: No pain with palpation of the paravertebral musculature, spinous processes, or SI joints bilaterally    Provocative tests: Negative supine SLR bilaterally.  Negative seated slum facet joint that   measure up to 4 mm and effaces the left lateral thecal sac and contributes to the spinal canal stenosis. Cephalad directed left paracentral zone disc extrusion. Moderate to severe right greater than left neural foraminal stenosis.   L5-S1 If he continues to have symptoms 1 month from now he is instructed to call the office and we will get him set up for left L4-L5 facet joint aspiration and injection under fluoroscopic guidance, local anesthesia.     Marcus Burrell DO  [facility]

## 2018-07-11 ENCOUNTER — PATIENT OUTREACH (OUTPATIENT)
Dept: CASE MANAGEMENT | Age: 68
End: 2018-07-11

## 2018-07-11 DIAGNOSIS — F32.0 MAJOR DEPRESSIVE DISORDER, SINGLE EPISODE, MILD (HCC): ICD-10-CM

## 2018-07-11 DIAGNOSIS — M54.31 SCIATICA OF RIGHT SIDE: ICD-10-CM

## 2018-07-11 NOTE — PROGRESS NOTES
Spoke to Krishna Sierra verified for CCM call.     Medical History  Patient Active Problem List:     Posterior capsule opacification     Pseudophakia of both eyes     Dermatochalasis of eyelid     MGD (meibomian gland dysfunction)     Chronic airway obstruction carbs and refined sweets. He is aware that he needs to manage his diet in addition to his exercise to help keep his weight down for assistance to his back pain.  He said he is going to go ahead and call and make the appt for Dr. Richard Mckeon for the appt to do t interventions: see above    Time Spent This Encounter Total: 25 min medical record review, telephone communication, care plan updates where needed, and education. Provided acknowledgment and validation to patient's concerns.      Monthly Minute Total includ

## 2018-07-12 ENCOUNTER — PATIENT OUTREACH (OUTPATIENT)
Dept: CASE MANAGEMENT | Age: 68
End: 2018-07-12

## 2018-07-12 NOTE — PROGRESS NOTES
Pt requested assistance to contact Dr. Quoc Don to schedule his appt for his injection.   Time spent 5 minutes

## 2018-07-31 PROCEDURE — 99490 CHRNC CARE MGMT STAFF 1ST 20: CPT

## 2018-08-08 ENCOUNTER — PATIENT OUTREACH (OUTPATIENT)
Dept: CASE MANAGEMENT | Age: 68
End: 2018-08-08

## 2018-08-08 NOTE — PROGRESS NOTES
LVM for pt to call me at his earliest convenience so that I might assist him in his monthly ccm outreach. P= Await call back.

## 2018-08-09 ENCOUNTER — PATIENT OUTREACH (OUTPATIENT)
Dept: CASE MANAGEMENT | Age: 68
End: 2018-08-09

## 2018-08-09 DIAGNOSIS — F17.200 TOBACCO USE DISORDER: ICD-10-CM

## 2018-08-09 DIAGNOSIS — M54.31 SCIATICA OF RIGHT SIDE: ICD-10-CM

## 2018-08-09 DIAGNOSIS — F32.0 MAJOR DEPRESSIVE DISORDER, SINGLE EPISODE, MILD (HCC): ICD-10-CM

## 2018-08-09 NOTE — PROGRESS NOTES
Spoke to Krishna Sierra verified for CCM call.     Medical History  Patient Active Problem List:     Posterior capsule opacification     Pseudophakia of both eyes     Dermatochalasis of eyelid     MGD (meibomian gland dysfunction)     Chronic airway obstruction Encouraged him to keep positive thinking going. We talked about how he might see some immediate results from the lidocaine, and he should ask them what he should take or do/use for pain prior to that wearing off while the steroid is kicking in.           Pr

## 2018-08-13 ENCOUNTER — TELEPHONE (OUTPATIENT)
Dept: NEUROLOGY | Facility: CLINIC | Age: 68
End: 2018-08-13

## 2018-08-13 ENCOUNTER — OFFICE VISIT (OUTPATIENT)
Dept: NEUROLOGY | Facility: CLINIC | Age: 68
End: 2018-08-13

## 2018-08-13 VITALS
SYSTOLIC BLOOD PRESSURE: 120 MMHG | HEART RATE: 80 BPM | WEIGHT: 210 LBS | DIASTOLIC BLOOD PRESSURE: 82 MMHG | BODY MASS INDEX: 31.83 KG/M2 | HEIGHT: 68 IN

## 2018-08-13 DIAGNOSIS — M71.38 SYNOVIAL CYST OF LUMBAR FACET JOINT: Primary | ICD-10-CM

## 2018-08-13 NOTE — TELEPHONE ENCOUNTER
Patient has been scheduled for a Left L4-5 facet joint aspiration and injection under fluoroscopy, local anesthesia on 8-20-18 at the [facility]. Medications and allergies reviewed.  Patient informed to hold aspirins, nsaids, blood thinners, multivitamins, vitami

## 2018-08-13 NOTE — TELEPHONE ENCOUNTER
Medicare Online for insurance coverage o fleft L4-5 facet joint aspiration and injection cpt codes 68201, 78696,. Insurance was verified and procedure is a covered benefit and does not require authorization.   Will inform Nursing

## 2018-08-13 NOTE — PROGRESS NOTES
HPI:    Patient ID: Dorie Nuno is a 69year old male. He was last seen on 7/6/2018 for follow-up of left-sided low back pain radiating down the lateral thigh to the ankle.   Since he was improving I recommended he continue gabapentin 100mg TID and tramadol

## 2018-08-13 NOTE — PROGRESS NOTES
Mr. Robert Geiger was last seen on 7/6/2018 for follow-up of left-sided low back pain radiating down the lateral thigh to the ankle. Since he was improving I recommended he continue gabapentin 100mg TID and tramadol PRN.  He was instructed to call the clinic and we

## 2018-08-20 ENCOUNTER — OFFICE VISIT (OUTPATIENT)
Dept: SURGERY | Facility: CLINIC | Age: 68
End: 2018-08-20

## 2018-08-20 DIAGNOSIS — M71.38 SYNOVIAL CYST OF LUMBAR SPINE: Primary | ICD-10-CM

## 2018-08-20 PROCEDURE — 64493 INJ PARAVERT F JNT L/S 1 LEV: CPT | Performed by: PHYSICAL MEDICINE & REHABILITATION

## 2018-08-20 NOTE — PROCEDURES
[facility] Z-JOINT/FACET INJECTIONS  NAME:  Galen Dumont    MR #:    [id number] :  1950     PHYSICIAN:  Hui Gale        Operative Report    DATE OF PROCEDURE: 2018   PREOPERATIVE DIAGNOSES: 1. left L4-5 sy needles were removed. The patient's skin was cleaned. Band-Aids were applied. The patient was transferred to the cart and into [facility]. The patient was given discharge instructions and will follow up in the clinic as scheduled.   Throughout the whole proced

## 2018-08-22 ENCOUNTER — PATIENT OUTREACH (OUTPATIENT)
Dept: CASE MANAGEMENT | Age: 68
End: 2018-08-22

## 2018-08-31 PROCEDURE — 99490 CHRNC CARE MGMT STAFF 1ST 20: CPT

## 2018-09-12 ENCOUNTER — PATIENT OUTREACH (OUTPATIENT)
Dept: CASE MANAGEMENT | Age: 68
End: 2018-09-12

## 2018-09-12 NOTE — PROGRESS NOTES
Pt. Vero Medeiros he is on vacation in [place] and he asked me to call him back after I get back from my vacation.  P= Call pt back around 9/24/18

## 2018-09-12 NOTE — PROGRESS NOTES
LVM for pt to please call me at his earliest convenience so that I might assist him in his monthly ccm outreach. P= Await call return.

## 2018-09-24 ENCOUNTER — PATIENT OUTREACH (OUTPATIENT)
Dept: CASE MANAGEMENT | Age: 68
End: 2018-09-24

## 2018-09-24 DIAGNOSIS — F32.0 MAJOR DEPRESSIVE DISORDER, SINGLE EPISODE, MILD (HCC): ICD-10-CM

## 2018-09-24 DIAGNOSIS — M71.38 SYNOVIAL CYST OF LUMBAR SPINE: ICD-10-CM

## 2018-09-24 DIAGNOSIS — M54.31 SCIATICA OF RIGHT SIDE: ICD-10-CM

## 2018-09-24 NOTE — PROGRESS NOTES
Spoke to Krishna Sierra verified for CCM call.     Medical History  Patient Active Problem List:     Posterior capsule opacification     Pseudophakia of both eyes     Dermatochalasis of eyelid     MGD (meibomian gland dysfunction)     Chronic airway obstruction yes    New Goal (if previous met)  • What would you say is your biggest concerns about your health? Now would definitely be this pain. I need this to be half to gone in my back and left leg. • What steps do you think you could take to work on this?  He

## 2018-09-25 ENCOUNTER — OFFICE VISIT (OUTPATIENT)
Dept: INTERNAL MEDICINE CLINIC | Facility: CLINIC | Age: 68
End: 2018-09-25
Payer: MEDICARE

## 2018-09-25 VITALS
HEART RATE: 99 BPM | WEIGHT: 210 LBS | SYSTOLIC BLOOD PRESSURE: 109 MMHG | HEIGHT: 68 IN | DIASTOLIC BLOOD PRESSURE: 76 MMHG | BODY MASS INDEX: 31.83 KG/M2 | RESPIRATION RATE: 16 BRPM

## 2018-09-25 DIAGNOSIS — E78.2 MIXED HYPERLIPIDEMIA: ICD-10-CM

## 2018-09-25 DIAGNOSIS — I10 ESSENTIAL HYPERTENSION WITH GOAL BLOOD PRESSURE LESS THAN 140/90: Primary | ICD-10-CM

## 2018-09-25 DIAGNOSIS — Z12.5 SCREENING FOR PROSTATE CANCER: ICD-10-CM

## 2018-09-25 DIAGNOSIS — E66.3 OVERWEIGHT (BMI 25.0-29.9): ICD-10-CM

## 2018-09-25 PROCEDURE — 99214 OFFICE O/P EST MOD 30 MIN: CPT | Performed by: INTERNAL MEDICINE

## 2018-09-25 PROCEDURE — G0463 HOSPITAL OUTPT CLINIC VISIT: HCPCS | Performed by: INTERNAL MEDICINE

## 2018-09-30 PROCEDURE — 99490 CHRNC CARE MGMT STAFF 1ST 20: CPT

## 2018-10-01 ENCOUNTER — OFFICE VISIT (OUTPATIENT)
Dept: NEUROLOGY | Facility: CLINIC | Age: 68
End: 2018-10-01
Payer: MEDICARE

## 2018-10-01 ENCOUNTER — TELEPHONE (OUTPATIENT)
Dept: NEUROLOGY | Facility: CLINIC | Age: 68
End: 2018-10-01

## 2018-10-01 VITALS
HEIGHT: 68 IN | HEART RATE: 100 BPM | SYSTOLIC BLOOD PRESSURE: 104 MMHG | WEIGHT: 210 LBS | DIASTOLIC BLOOD PRESSURE: 56 MMHG | BODY MASS INDEX: 31.83 KG/M2

## 2018-10-01 DIAGNOSIS — M71.38 SYNOVIAL CYST OF LUMBAR SPINE: Primary | ICD-10-CM

## 2018-10-01 PROCEDURE — 99213 OFFICE O/P EST LOW 20 MIN: CPT | Performed by: PHYSICAL MEDICINE & REHABILITATION

## 2018-10-01 RX ORDER — HYDROCODONE BITARTRATE AND ACETAMINOPHEN 10; 325 MG/1; MG/1
1 TABLET ORAL EVERY 6 HOURS PRN
COMMUNITY
End: 2018-11-26

## 2018-10-01 NOTE — PROGRESS NOTES
Patient has been scheduled for a Left L4-5 facet joint aspiration and injection under fluoroscopy, local  on 10/10/2018 at the [facility]. Medications and allergies reviewed.  Patient informed to hold aspirins, nsaids, blood thinners, vitamins and fish oils 3-7 d

## 2018-10-01 NOTE — TELEPHONE ENCOUNTER
Medicare Online for insurance coverage of  left L4-5 facet joint aspiration and injection cpt code 24991. Insurance was verified and procedure is a covered benefit and does not require authorization. Procedure is scheduled on 10/10/18.   Will inform Miquel

## 2018-10-01 NOTE — PROGRESS NOTES
HPI:    Patient ID: Lennie Garcia is a 69year old male. He presents for follow-up of left-sided low back pain radiating down the lateral thigh to the ankle. He has completed physical therapy and had significant reduction in his pain.   He continued to hav flexion or extension    Palpation: No pain with palpation of the paravertebral musculature, spinous processes, or SI joints bilaterally    Provocative tests: Negative supine SLR bilaterally.    Neurological:   Strength 5/5 bilateral LE    Reflexes 1/4 quads

## 2018-10-03 NOTE — PROGRESS NOTES
Berto Escalante is a 69year old male. HPI:   1. Essential hypertension with goal <140/90    Patient has been following low salt diet and has been taking anti-hypertensive prescriptions as prescribed.  Blood pressure has been checked and is under good control a abscess w/o bleeding 11/8/2017   • High blood pressure    • High cholesterol    • History of tonsillitis     Tonsillectomy; per NG   • Internal hemorrhoids 11/8/2017   • MGD (meibomian gland dysfunction) 2011    OU; per NG   • Obesity, unspecified     per to better blood pressure control.    - CBC WITH DIFFERENTIAL WITH PLATELET; Future  - COMP METABOLIC PANEL (14); Future  - URINALYSIS, ROUTINE; Future    2.  Mixed hyperlipidemia    Patient instructed to take cholesterol lowering medications as prescribed a

## 2018-10-10 ENCOUNTER — OFFICE VISIT (OUTPATIENT)
Dept: SURGERY | Facility: CLINIC | Age: 68
End: 2018-10-10
Payer: MEDICARE

## 2018-10-10 DIAGNOSIS — M71.38 SYNOVIAL CYST OF LUMBAR SPINE: Primary | ICD-10-CM

## 2018-10-10 PROCEDURE — 64493 INJ PARAVERT F JNT L/S 1 LEV: CPT | Performed by: PHYSICAL MEDICINE & REHABILITATION

## 2018-10-10 NOTE — PROCEDURES
[facility] Z-JOINT/FACET INJECTIONS  NAME:  Lennie Garcia    MR #:    [id number] :  1950     PHYSICIAN:  Connie Javed        Operative Report    DATE OF PROCEDURE: 10/10/2018   PREOPERATIVE DIAGNOSES: 1. left L4-5 s Band-Aids were applied. The patient was transferred to the cart and into [facility]. The patient was given discharge instructions and will follow up in the clinic as scheduled.   Throughout the whole procedure, the patient's pulse oximetry and vital signs were

## 2018-10-26 ENCOUNTER — PATIENT OUTREACH (OUTPATIENT)
Dept: CASE MANAGEMENT | Age: 68
End: 2018-10-26

## 2018-10-26 NOTE — PROGRESS NOTES
LVM with pt to call me at his earliest convenience so that we can complete his monthly ccm outreach. P= Await call return.

## 2018-10-29 ENCOUNTER — PATIENT OUTREACH (OUTPATIENT)
Dept: CASE MANAGEMENT | Age: 68
End: 2018-10-29

## 2018-10-29 NOTE — PROGRESS NOTES
LVM for pt to call me at her earliest convenience so that I might assist to complete her monthly ccm outreach. P= Await call return.

## 2018-10-30 ENCOUNTER — PATIENT OUTREACH (OUTPATIENT)
Dept: CASE MANAGEMENT | Age: 68
End: 2018-10-30

## 2018-10-30 NOTE — PROGRESS NOTES
JONM for pt to call me at his earliest convenience so that we might complete his monthly ccm outreach. P= Await call back.

## 2018-10-31 ENCOUNTER — PATIENT OUTREACH (OUTPATIENT)
Dept: CASE MANAGEMENT | Age: 68
End: 2018-10-31

## 2018-10-31 DIAGNOSIS — I10 ESSENTIAL HYPERTENSION WITH GOAL BLOOD PRESSURE LESS THAN 140/90: ICD-10-CM

## 2018-10-31 DIAGNOSIS — E66.3 OVERWEIGHT (BMI 25.0-29.9): ICD-10-CM

## 2018-10-31 DIAGNOSIS — M54.31 SCIATICA OF RIGHT SIDE: ICD-10-CM

## 2018-10-31 DIAGNOSIS — M71.38 SYNOVIAL CYST OF LUMBAR SPINE: ICD-10-CM

## 2018-10-31 PROCEDURE — 99490 CHRNC CARE MGMT STAFF 1ST 20: CPT

## 2018-10-31 NOTE — PROGRESS NOTES
Spoke to Krishna Sierra verified for CCM call.     Medical History  Patient Active Problem List:     Posterior capsule opacification     Pseudophakia of both eyes     Dermatochalasis of eyelid     MGD (meibomian gland dysfunction)     Chronic airway obstruction Goal (if previous met)  • What would you say is your biggest concerns about your health? Right now is to get my leg pain sciatica     • What steps do you think you could take to work on this? I have another injection set up.        • My goal for next month

## 2018-11-15 RX ORDER — SIMVASTATIN 20 MG
TABLET ORAL
Qty: 90 TABLET | Refills: 0 | Status: SHIPPED | OUTPATIENT
Start: 2018-11-15 | End: 2018-11-26

## 2018-11-15 NOTE — TELEPHONE ENCOUNTER
Please review; protocol failed.     Cholesterol Medications  Protocol Criteria:  · Appointment scheduled in the past 12 months or in the next 3 months  · ALT & LDL on file in the past 12 months  · ALT result < 80  · LDL result <130   Recent Outpatient Visit

## 2018-11-20 ENCOUNTER — PATIENT OUTREACH (OUTPATIENT)
Dept: CASE MANAGEMENT | Age: 68
End: 2018-11-20

## 2018-11-20 NOTE — PROGRESS NOTES
JONM for pt to call me back at his earliest convenience so that we might complete his monthly ccm outreach. P= Await call return.

## 2018-11-26 ENCOUNTER — OFFICE VISIT (OUTPATIENT)
Dept: NEUROLOGY | Facility: CLINIC | Age: 68
End: 2018-11-26
Payer: MEDICARE

## 2018-11-26 VITALS
DIASTOLIC BLOOD PRESSURE: 68 MMHG | SYSTOLIC BLOOD PRESSURE: 128 MMHG | BODY MASS INDEX: 32 KG/M2 | HEART RATE: 90 BPM | WEIGHT: 210 LBS | RESPIRATION RATE: 20 BRPM

## 2018-11-26 DIAGNOSIS — M71.38 SYNOVIAL CYST OF LUMBAR SPINE: Primary | ICD-10-CM

## 2018-11-26 PROCEDURE — 99213 OFFICE O/P EST LOW 20 MIN: CPT | Performed by: PHYSICAL MEDICINE & REHABILITATION

## 2018-11-26 RX ORDER — MULTIVIT WITH MINERALS/LUTEIN
1000 TABLET ORAL DAILY
COMMUNITY

## 2018-11-26 NOTE — PROGRESS NOTES
HPI:    Patient ID: Roger Alcala is a 69year old male. He presents for follow-up of left-sided low back pain radiating down the lateral thigh to the ankle. He has completed physical therapy and had significant reduction in his pain.   He continued to hav of the paravertebral musculature, spinous processes, or SI joints bilaterally    Provocative tests: Negative supine SLR bilaterally.    Neurological:   Sensation: Decreased sensation in the posterolateral left lower leg    Reflexes: 2/4 quad reflexes b/l

## 2018-11-26 NOTE — PATIENT INSTRUCTIONS
1) Call the office if you develop recurrent pain, or if the numbness, tingling or odd sensation in the bottom of your left foot worsens.  If the symptoms are in the same areas that you have been experiencing we can repeat the injection without you needing t prescriptions  · Written prescriptions must be picked up in office. · Please allow the office 48-72 hours to fill the prescription as our physicians rotate between multiple offices and procedure days in the hospitals.   · Patient must present photo ID at t

## 2018-12-20 ENCOUNTER — PATIENT OUTREACH (OUTPATIENT)
Dept: CASE MANAGEMENT | Age: 68
End: 2018-12-20

## 2018-12-20 DIAGNOSIS — I10 ESSENTIAL HYPERTENSION WITH GOAL BLOOD PRESSURE LESS THAN 140/90: ICD-10-CM

## 2018-12-20 DIAGNOSIS — E78.2 MIXED HYPERLIPIDEMIA: ICD-10-CM

## 2018-12-20 DIAGNOSIS — F32.0 MAJOR DEPRESSIVE DISORDER, SINGLE EPISODE, MILD (HCC): ICD-10-CM

## 2018-12-20 NOTE — PROGRESS NOTES
Called patient for monthly CCM outreach, left message to call back.       Chart review - 5 min  Time with patient - 0 min  Total time - 5 min

## 2018-12-20 NOTE — PROGRESS NOTES
12/20/2018  Spoke to Giorgi moran for CCM.       Updates to patient care team/ comments: None  Patient reported changes in medications: None  Med Adherence  Comment: Taking as directed     Health Maintenance: Pt refuses flu and shingles vaccine as well as smoking ce confident               - confidence: : 5    Care Manager Follow Up: 3-4 weeks  Reason For Follow Up: review progress and or barriers towards patients goals.      Care managers interventions: Encouraged to switch up walking routine, discussed health mainten

## 2018-12-31 PROCEDURE — 99490 CHRNC CARE MGMT STAFF 1ST 20: CPT

## 2019-01-03 ENCOUNTER — LAB ENCOUNTER (OUTPATIENT)
Dept: LAB | Age: 69
End: 2019-01-03
Attending: INTERNAL MEDICINE
Payer: MEDICARE

## 2019-01-03 DIAGNOSIS — I10 ESSENTIAL HYPERTENSION WITH GOAL BLOOD PRESSURE LESS THAN 140/90: ICD-10-CM

## 2019-01-03 DIAGNOSIS — E78.2 MIXED HYPERLIPIDEMIA: ICD-10-CM

## 2019-01-03 DIAGNOSIS — Z12.5 SCREENING FOR PROSTATE CANCER: ICD-10-CM

## 2019-01-03 LAB
ALBUMIN SERPL BCP-MCNC: 3.8 G/DL (ref 3.5–4.8)
ALBUMIN/GLOB SERPL: 1.2 {RATIO} (ref 1–2)
ALP SERPL-CCNC: 57 U/L (ref 32–100)
ALT SERPL-CCNC: 20 U/L (ref 17–63)
ANION GAP SERPL CALC-SCNC: 8 MMOL/L (ref 0–18)
AST SERPL-CCNC: 17 U/L (ref 15–41)
BACTERIA UR QL AUTO: NEGATIVE /HPF
BASOPHILS # BLD: 0 K/UL (ref 0–0.2)
BASOPHILS NFR BLD: 1 %
BILIRUB SERPL-MCNC: 0.6 MG/DL (ref 0.3–1.2)
BILIRUB UR QL: NEGATIVE
BUN SERPL-MCNC: 11 MG/DL (ref 8–20)
BUN/CREAT SERPL: 12.8 (ref 10–20)
CALCIUM SERPL-MCNC: 8.8 MG/DL (ref 8.5–10.5)
CHLORIDE SERPL-SCNC: 101 MMOL/L (ref 95–110)
CHOLEST SERPL-MCNC: 87 MG/DL (ref 110–200)
CLARITY UR: CLEAR
CO2 SERPL-SCNC: 28 MMOL/L (ref 22–32)
COLOR UR: YELLOW
COMPLEXED PSA SERPL-MCNC: 1.02 NG/ML (ref 0.01–4)
CREAT SERPL-MCNC: 0.86 MG/DL (ref 0.5–1.5)
EOSINOPHIL # BLD: 0.5 K/UL (ref 0–0.7)
EOSINOPHIL NFR BLD: 6 %
ERYTHROCYTE [DISTWIDTH] IN BLOOD BY AUTOMATED COUNT: 13.4 % (ref 11–15)
GLOBULIN PLAS-MCNC: 3.2 G/DL (ref 2.5–3.7)
GLUCOSE SERPL-MCNC: 109 MG/DL (ref 70–99)
GLUCOSE UR-MCNC: NEGATIVE MG/DL
HCT VFR BLD AUTO: 47.5 % (ref 41–52)
HDLC SERPL-MCNC: 43 MG/DL
HGB BLD-MCNC: 16 G/DL (ref 13.5–17.5)
HGB UR QL STRIP.AUTO: NEGATIVE
KETONES UR-MCNC: NEGATIVE MG/DL
LDLC SERPL CALC-MCNC: 30 MG/DL (ref 0–99)
LEUKOCYTE ESTERASE UR QL STRIP.AUTO: NEGATIVE
LYMPHOCYTES # BLD: 2.4 K/UL (ref 1–4)
LYMPHOCYTES NFR BLD: 28 %
MCH RBC QN AUTO: 33 PG (ref 27–32)
MCHC RBC AUTO-ENTMCNC: 33.7 G/DL (ref 32–37)
MCV RBC AUTO: 97.7 FL (ref 80–100)
MONOCYTES # BLD: 0.9 K/UL (ref 0–1)
MONOCYTES NFR BLD: 11 %
NEUTROPHILS # BLD AUTO: 4.7 K/UL (ref 1.8–7.7)
NEUTROPHILS NFR BLD: 55 %
NITRITE UR QL STRIP.AUTO: NEGATIVE
NONHDLC SERPL-MCNC: 44 MG/DL
OSMOLALITY UR CALC.SUM OF ELEC: 284 MOSM/KG (ref 275–295)
PATIENT FASTING: YES
PH UR: 5 [PH] (ref 5–8)
PLATELET # BLD AUTO: 221 K/UL (ref 140–400)
PMV BLD AUTO: 7.7 FL (ref 7.4–10.3)
POTASSIUM SERPL-SCNC: 4.6 MMOL/L (ref 3.3–5.1)
PROT SERPL-MCNC: 7 G/DL (ref 5.9–8.4)
PROT UR-MCNC: NEGATIVE MG/DL
PSA SERPL-MCNC: 0.8 NG/ML (ref 0–4)
RBC # BLD AUTO: 4.86 M/UL (ref 4.5–5.9)
RBC #/AREA URNS AUTO: 1 /HPF
SODIUM SERPL-SCNC: 137 MMOL/L (ref 136–144)
SP GR UR STRIP: 1.02 (ref 1–1.03)
TRIGL SERPL-MCNC: 71 MG/DL (ref 1–149)
TSH SERPL-ACNC: 1.7 UIU/ML (ref 0.45–5.33)
UROBILINOGEN UR STRIP-ACNC: <2
VIT C UR-MCNC: 40 MG/DL
WBC # BLD AUTO: 8.5 K/UL (ref 4–11)
WBC #/AREA URNS AUTO: 1 /HPF

## 2019-01-03 PROCEDURE — 36415 COLL VENOUS BLD VENIPUNCTURE: CPT

## 2019-01-03 PROCEDURE — 80061 LIPID PANEL: CPT

## 2019-01-03 PROCEDURE — 84443 ASSAY THYROID STIM HORMONE: CPT

## 2019-01-03 PROCEDURE — 85025 COMPLETE CBC W/AUTO DIFF WBC: CPT

## 2019-01-03 PROCEDURE — 81003 URINALYSIS AUTO W/O SCOPE: CPT

## 2019-01-03 PROCEDURE — 80053 COMPREHEN METABOLIC PANEL: CPT

## 2019-01-23 ENCOUNTER — PATIENT OUTREACH (OUTPATIENT)
Dept: CASE MANAGEMENT | Age: 69
End: 2019-01-23

## 2019-01-23 DIAGNOSIS — E78.2 MIXED HYPERLIPIDEMIA: ICD-10-CM

## 2019-01-23 DIAGNOSIS — I10 ESSENTIAL HYPERTENSION WITH GOAL BLOOD PRESSURE LESS THAN 140/90: ICD-10-CM

## 2019-01-23 DIAGNOSIS — M71.38 SYNOVIAL CYST OF LUMBAR SPINE: ICD-10-CM

## 2019-01-23 DIAGNOSIS — M54.31 SCIATICA OF RIGHT SIDE: ICD-10-CM

## 2019-01-23 DIAGNOSIS — E66.3 OVERWEIGHT (BMI 25.0-29.9): ICD-10-CM

## 2019-01-23 NOTE — PROGRESS NOTES
Spoke to Krishna Sierra verified for CCM call.     Medical History  Patient Active Problem List:     Posterior capsule opacification     Pseudophakia of both eyes     Dermatochalasis of eyelid     MGD (meibomian gland dysfunction)     Chronic airway obstruction day. And then was the holidays  He said he had a little party thrown on his behalf and he had a good time and he is trying to focus on his eating and exercise now.  .     Previous goal met: yes    New Goal (if previous met)  • What would you say is your big

## 2019-01-23 NOTE — PROGRESS NOTES
LVM for pt to call me at his earliest convenience so that I might assist him to complete his monthly ccm. P- Await call return.

## 2019-01-29 ENCOUNTER — OFFICE VISIT (OUTPATIENT)
Dept: INTERNAL MEDICINE CLINIC | Facility: CLINIC | Age: 69
End: 2019-01-29
Payer: MEDICARE

## 2019-01-29 VITALS
HEART RATE: 99 BPM | BODY MASS INDEX: 31.7 KG/M2 | RESPIRATION RATE: 16 BRPM | HEIGHT: 69 IN | WEIGHT: 214 LBS | SYSTOLIC BLOOD PRESSURE: 115 MMHG | DIASTOLIC BLOOD PRESSURE: 81 MMHG

## 2019-01-29 DIAGNOSIS — I10 ESSENTIAL HYPERTENSION WITH GOAL BLOOD PRESSURE LESS THAN 140/90: Primary | ICD-10-CM

## 2019-01-29 DIAGNOSIS — E78.2 MIXED HYPERLIPIDEMIA: ICD-10-CM

## 2019-01-29 DIAGNOSIS — E66.1 CLASS 1 DRUG-INDUCED OBESITY WITH BODY MASS INDEX (BMI) OF 31.0 TO 31.9 IN ADULT, UNSPECIFIED WHETHER SERIOUS COMORBIDITY PRESENT: ICD-10-CM

## 2019-01-29 PROBLEM — E66.811 CLASS 1 DRUG-INDUCED OBESITY WITH BODY MASS INDEX (BMI) OF 31.0 TO 31.9 IN ADULT: Status: ACTIVE | Noted: 2019-01-29

## 2019-01-29 PROBLEM — E66.811 CLASS 1 DRUG-INDUCED OBESITY WITH SERIOUS COMORBIDITY AND BODY MASS INDEX (BMI) OF 31.0 TO 31.9 IN ADULT: Status: ACTIVE | Noted: 2019-01-29

## 2019-01-29 PROCEDURE — G0463 HOSPITAL OUTPT CLINIC VISIT: HCPCS | Performed by: INTERNAL MEDICINE

## 2019-01-29 PROCEDURE — 99214 OFFICE O/P EST MOD 30 MIN: CPT | Performed by: INTERNAL MEDICINE

## 2019-01-29 RX ORDER — LISINOPRIL 10 MG/1
10 TABLET ORAL
Qty: 90 TABLET | Refills: 3 | Status: SHIPPED | OUTPATIENT
Start: 2019-01-29 | End: 2020-03-01

## 2019-01-29 RX ORDER — SIMVASTATIN 20 MG
TABLET ORAL
Qty: 90 TABLET | Refills: 3 | Status: SHIPPED | OUTPATIENT
Start: 2019-01-29 | End: 2019-10-11

## 2019-01-29 NOTE — PROGRESS NOTES
Brittny Flynn is a 77year old male. HPI:   1. Essential hypertension with goal <140/90    Patient has been following low salt diet and has been taking anti-hypertensive prescriptions as prescribed.  Blood pressure has been checked and is under good control a Internal hemorrhoids 11/8/2017   • MGD (meibomian gland dysfunction) 2011    OU; per NG   • Obesity, unspecified     per NG   • Other and unspecified hyperlipidemia     per NG   • PVD (peripheral vascular disease) ([facility]     per NG   • S/P colonoscopic poly prescribed and to continue to follow a low fat, low cholesterol diet as discussed. Try to exercise at least 3 times weekly to build strength, burn calories and help to achieve ideal body weight. 3. Overweight    Patient has been overweight for years.  Sarah Cardoso

## 2019-01-31 PROCEDURE — 99490 CHRNC CARE MGMT STAFF 1ST 20: CPT

## 2019-02-19 ENCOUNTER — PATIENT OUTREACH (OUTPATIENT)
Dept: CASE MANAGEMENT | Age: 69
End: 2019-02-19

## 2019-02-19 NOTE — PROGRESS NOTES
LVM for pt to please call me at her earliest convenience so that we might complete his monthly ccm outreach. P- Await call return.

## 2019-02-21 ENCOUNTER — PATIENT OUTREACH (OUTPATIENT)
Dept: CASE MANAGEMENT | Age: 69
End: 2019-02-21

## 2019-02-21 DIAGNOSIS — F32.0 MAJOR DEPRESSIVE DISORDER, SINGLE EPISODE, MILD (HCC): ICD-10-CM

## 2019-02-21 DIAGNOSIS — I10 ESSENTIAL HYPERTENSION WITH GOAL BLOOD PRESSURE LESS THAN 140/90: ICD-10-CM

## 2019-02-21 DIAGNOSIS — E66.3 OVERWEIGHT (BMI 25.0-29.9): ICD-10-CM

## 2019-02-21 NOTE — PROGRESS NOTES
Spoke to Krishna Sierra verified for CCM call.     Medical History  Patient Active Problem List:     Posterior capsule opacification     Pseudophakia of both eyes     Dermatochalasis of eyelid     MGD (meibomian gland dysfunction)     Chronic airway obstruction like, he likes to go out and walk outdoors and this winter has been a challenge . • My goal for next month is: (Patient Stated) see above.             • Patient Reported Barriers And Concerns: weather                   - Plan for overcoming barriers:

## 2019-02-28 PROCEDURE — 99490 CHRNC CARE MGMT STAFF 1ST 20: CPT

## 2019-03-20 RX ORDER — LISINOPRIL 10 MG/1
TABLET ORAL
Qty: 90 TABLET | Refills: 0 | Status: SHIPPED | OUTPATIENT
Start: 2019-03-20 | End: 2019-03-28

## 2019-03-27 ENCOUNTER — TELEPHONE (OUTPATIENT)
Dept: CASE MANAGEMENT | Age: 69
End: 2019-03-27

## 2019-03-27 ENCOUNTER — PATIENT OUTREACH (OUTPATIENT)
Dept: CASE MANAGEMENT | Age: 69
End: 2019-03-27

## 2019-03-27 NOTE — TELEPHONE ENCOUNTER
Pt calling to say he is about to run out of his 81mg ASA daily that he has been taking for years  and saw the news about:   HEART HEALTH  Daily aspirin no longer recommended to prevent heart attacks for healthy, older adults  For patients with no prior his

## 2019-03-27 NOTE — PROGRESS NOTES
JONM for pt to call me back at his earliest convenience so that I might assist him to complete his monthly ccm outreach. P= Await call return.

## 2019-03-27 NOTE — TELEPHONE ENCOUNTER
Will call pt tomorrow for his monthly ccm and tell him the response from Dr. Rodney Pierson. He said he is working at the [facility] [employer] today and would await my response to him tomorrow.

## 2019-03-28 ENCOUNTER — PATIENT OUTREACH (OUTPATIENT)
Dept: CASE MANAGEMENT | Age: 69
End: 2019-03-28

## 2019-03-28 NOTE — PROGRESS NOTES
JONM for pt to call me at his earliest convenience so that we could complete his monthly ccm. I also left him a message that Dr. Ava Brewster said to stop taking his baby ASA. P= Await call back from pt.

## 2019-03-28 NOTE — TELEPHONE ENCOUNTER
Left VM for pt to stop the ASA per Dr. Annita Lowry orders.  Removed the medication from pt's med list.

## 2019-04-18 ENCOUNTER — PATIENT OUTREACH (OUTPATIENT)
Dept: CASE MANAGEMENT | Age: 69
End: 2019-04-18

## 2019-04-18 NOTE — PROGRESS NOTES
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 I left marlo's information. .       Time spent 5 min

## 2019-05-03 ENCOUNTER — PATIENT OUTREACH (OUTPATIENT)
Dept: CASE MANAGEMENT | Age: 69
End: 2019-05-03

## 2019-05-03 NOTE — PROGRESS NOTES
Called patient and left a message for patient to call back when they can.  Reviewed patient chart.         Time Spent This Encounter Total: 5 min medical record review, telephone communication, care plan updates where needed, education, goals and action mary

## 2019-05-06 ENCOUNTER — PATIENT OUTREACH (OUTPATIENT)
Dept: CASE MANAGEMENT | Age: 69
End: 2019-05-06

## 2019-05-06 NOTE — PROGRESS NOTES
Patient returned my call and left voicemail. Pt stated on voicemail that he is going out of town and will return on 5/14/19. Will reach out to patient at that time.

## 2019-05-15 RX ORDER — SIMVASTATIN 20 MG
TABLET ORAL
Qty: 90 TABLET | Refills: 1 | Status: SHIPPED | OUTPATIENT
Start: 2019-05-15 | End: 2019-05-28

## 2019-05-21 ENCOUNTER — PATIENT OUTREACH (OUTPATIENT)
Dept: CASE MANAGEMENT | Age: 69
End: 2019-05-21

## 2019-05-28 ENCOUNTER — OFFICE VISIT (OUTPATIENT)
Dept: INTERNAL MEDICINE CLINIC | Facility: CLINIC | Age: 69
End: 2019-05-28
Payer: MEDICARE

## 2019-05-28 VITALS
SYSTOLIC BLOOD PRESSURE: 100 MMHG | BODY MASS INDEX: 30.96 KG/M2 | HEIGHT: 69 IN | RESPIRATION RATE: 16 BRPM | DIASTOLIC BLOOD PRESSURE: 70 MMHG | WEIGHT: 209 LBS

## 2019-05-28 DIAGNOSIS — I10 ESSENTIAL HYPERTENSION WITH GOAL BLOOD PRESSURE LESS THAN 140/90: Primary | ICD-10-CM

## 2019-05-28 DIAGNOSIS — E66.3 OVERWEIGHT (BMI 25.0-29.9): ICD-10-CM

## 2019-05-28 DIAGNOSIS — E78.2 MIXED HYPERLIPIDEMIA: ICD-10-CM

## 2019-05-28 PROCEDURE — 99214 OFFICE O/P EST MOD 30 MIN: CPT | Performed by: INTERNAL MEDICINE

## 2019-05-28 PROCEDURE — G0463 HOSPITAL OUTPT CLINIC VISIT: HCPCS | Performed by: INTERNAL MEDICINE

## 2019-05-28 NOTE — PROGRESS NOTES
Luis Georges is a 77year old male. HPI:   1. Essential hypertension with goal <140/90    Patient has been following low salt diet and has been taking anti-hypertensive prescriptions as prescribed.  Blood pressure has been checked and is under good control a per NG   • Obesity, unspecified     per NG   • Other and unspecified hyperlipidemia     per NG   • [facility]     per NG   • S/P colonoscopic polypectomy 11/8/2017   • Synovial cyst of lumbar spine 8/20/2018      Social History follow a low fat, low cholesterol diet as discussed. Try to exercise at least 3 times weekly to build strength, burn calories and help to achieve ideal body weight. 3. Overweight    Patient has been overweight for years.  Has been attempting to eat less

## 2019-06-11 ENCOUNTER — PATIENT OUTREACH (OUTPATIENT)
Dept: CASE MANAGEMENT | Age: 69
End: 2019-06-11

## 2019-06-11 NOTE — PROGRESS NOTES
Called patient for CCM monthly  Left detailed message to call back  Verified HIPPA  I will follow up with patient at a later time  Reviewed chart    Time spent this encounter: 5 mins    Total time spent this month: 5 mins

## 2019-06-25 ENCOUNTER — PATIENT OUTREACH (OUTPATIENT)
Dept: CASE MANAGEMENT | Age: 69
End: 2019-06-25

## 2019-06-25 DIAGNOSIS — L71.9 ROSACEA: ICD-10-CM

## 2019-06-25 DIAGNOSIS — Z12.5 SCREENING FOR PROSTATE CANCER: ICD-10-CM

## 2019-06-25 DIAGNOSIS — I10 ESSENTIAL HYPERTENSION WITH GOAL BLOOD PRESSURE LESS THAN 140/90: ICD-10-CM

## 2019-06-25 DIAGNOSIS — Z96.1 PSEUDOPHAKIA OF BOTH EYES: ICD-10-CM

## 2019-06-25 DIAGNOSIS — K64.8 INTERNAL HEMORRHOIDS: ICD-10-CM

## 2019-06-25 DIAGNOSIS — F17.200 TOBACCO USE DISORDER: ICD-10-CM

## 2019-06-25 DIAGNOSIS — H02.889 MGD (MEIBOMIAN GLAND DYSFUNCTION): ICD-10-CM

## 2019-06-25 DIAGNOSIS — M54.31 SCIATICA OF RIGHT SIDE: ICD-10-CM

## 2019-06-25 DIAGNOSIS — N52.9 IMPOTENCE OF ORGANIC ORIGIN: ICD-10-CM

## 2019-06-25 DIAGNOSIS — E78.2 MIXED HYPERLIPIDEMIA: ICD-10-CM

## 2019-06-25 DIAGNOSIS — Z98.49 STATUS POST CATARACT EXTRACTION, UNSPECIFIED LATERALITY: ICD-10-CM

## 2019-06-25 DIAGNOSIS — F32.0 MAJOR DEPRESSIVE DISORDER, SINGLE EPISODE, MILD (HCC): ICD-10-CM

## 2019-06-25 DIAGNOSIS — K57.30 DIVERTICULOSIS LARGE INTESTINE W/O PERFORATION OR ABSCESS W/O BLEEDING: ICD-10-CM

## 2019-06-25 DIAGNOSIS — E66.3 OVERWEIGHT (BMI 25.0-29.9): ICD-10-CM

## 2019-06-30 PROCEDURE — 99490 CHRNC CARE MGMT STAFF 1ST 20: CPT

## 2019-07-19 ENCOUNTER — PATIENT OUTREACH (OUTPATIENT)
Dept: CASE MANAGEMENT | Age: 69
End: 2019-07-19

## 2019-07-19 NOTE — PROGRESS NOTES
Called patient for CCM monthly  Left detailed message to call back  Verified HIPPA  I will follow up with patient at a later time  Reviewed chart    Time spent this encounter: 6 mins    Total time spent this month: 6 mins

## 2019-07-31 ENCOUNTER — PATIENT OUTREACH (OUTPATIENT)
Dept: CASE MANAGEMENT | Age: 69
End: 2019-07-31

## 2019-07-31 NOTE — PROGRESS NOTES
Called patient for CCM monthly  Left detailed message to call back  Verified HIPPA  I will follow up with patient at a later time  Reviewed chart    Time spent this encounter: 4 mins   Total time spent this month: 10 mins

## 2019-08-19 ENCOUNTER — PATIENT OUTREACH (OUTPATIENT)
Dept: CASE MANAGEMENT | Age: 69
End: 2019-08-19

## 2019-08-19 DIAGNOSIS — M71.38 SYNOVIAL CYST OF LUMBAR SPINE: ICD-10-CM

## 2019-08-19 DIAGNOSIS — Z12.5 SCREENING FOR PROSTATE CANCER: ICD-10-CM

## 2019-08-19 DIAGNOSIS — Z98.49 STATUS POST CATARACT EXTRACTION, UNSPECIFIED LATERALITY: ICD-10-CM

## 2019-08-19 DIAGNOSIS — E66.3 OVERWEIGHT (BMI 25.0-29.9): ICD-10-CM

## 2019-08-19 DIAGNOSIS — E78.2 MIXED HYPERLIPIDEMIA: ICD-10-CM

## 2019-08-19 DIAGNOSIS — H02.839 DERMATOCHALASIS OF EYELID: ICD-10-CM

## 2019-08-19 DIAGNOSIS — I10 ESSENTIAL HYPERTENSION WITH GOAL BLOOD PRESSURE LESS THAN 140/90: ICD-10-CM

## 2019-08-19 NOTE — PROGRESS NOTES
8/19/2019  Spoke to Giorgi moran for CCM.     Verified HIPPA    Patient Active Problem List:     Posterior capsule opacification     Pseudophakia of both eyes     Dermatochalasis of eyelid     MGD (meibomian gland dysfunction)     Chronic airway obstruction ([facility] dull ache now. He relates wanting to look into acupuncture- he states he has a friend who is going to give him info on this. There is a place in [place] that can perform the acupuncture for free.        Encouraged the importance of self care along with heal

## 2019-08-31 PROCEDURE — 99490 CHRNC CARE MGMT STAFF 1ST 20: CPT

## 2019-09-24 ENCOUNTER — OFFICE VISIT (OUTPATIENT)
Dept: INTERNAL MEDICINE CLINIC | Facility: CLINIC | Age: 69
End: 2019-09-24
Payer: MEDICARE

## 2019-09-24 VITALS
SYSTOLIC BLOOD PRESSURE: 110 MMHG | BODY MASS INDEX: 31.52 KG/M2 | DIASTOLIC BLOOD PRESSURE: 70 MMHG | WEIGHT: 208 LBS | RESPIRATION RATE: 16 BRPM | HEART RATE: 71 BPM | HEIGHT: 68 IN

## 2019-09-24 DIAGNOSIS — I10 ESSENTIAL HYPERTENSION WITH GOAL BLOOD PRESSURE LESS THAN 140/90: Primary | ICD-10-CM

## 2019-09-24 DIAGNOSIS — E78.2 MIXED HYPERLIPIDEMIA: ICD-10-CM

## 2019-09-24 DIAGNOSIS — E66.3 OVERWEIGHT (BMI 25.0-29.9): ICD-10-CM

## 2019-09-24 PROCEDURE — G0463 HOSPITAL OUTPT CLINIC VISIT: HCPCS | Performed by: INTERNAL MEDICINE

## 2019-09-24 PROCEDURE — 99214 OFFICE O/P EST MOD 30 MIN: CPT | Performed by: INTERNAL MEDICINE

## 2019-09-24 NOTE — PROGRESS NOTES
Hailey Arriaga is a 77year old male. HPI:   1. Essential hypertension with goal <140/90    Patient has been following low salt diet and has been taking anti-hypertensive prescriptions as prescribed.  Blood pressure has been checked and is under good control a per NG   • Obesity, unspecified     per NG   • Other and unspecified hyperlipidemia     per NG   • [facility]     per NG   • S/P colonoscopic polypectomy 11/8/2017   • Synovial cyst of lumbar spine 8/20/2018      Social History prescribed and to continue to follow a low fat, low cholesterol diet as discussed. Try to exercise at least 3 times weekly to build strength, burn calories and help to achieve ideal body weight. 3. Overweight    Patient has been overweight for years.  [place]

## 2019-10-10 ENCOUNTER — PATIENT OUTREACH (OUTPATIENT)
Dept: CASE MANAGEMENT | Age: 69
End: 2019-10-10

## 2019-10-10 DIAGNOSIS — E78.2 MIXED HYPERLIPIDEMIA: ICD-10-CM

## 2019-10-10 DIAGNOSIS — F32.0 MAJOR DEPRESSIVE DISORDER, SINGLE EPISODE, MILD (HCC): ICD-10-CM

## 2019-10-10 DIAGNOSIS — Z12.5 SCREENING FOR PROSTATE CANCER: ICD-10-CM

## 2019-10-10 DIAGNOSIS — I10 ESSENTIAL HYPERTENSION WITH GOAL BLOOD PRESSURE LESS THAN 140/90: ICD-10-CM

## 2019-10-10 NOTE — PROGRESS NOTES
10/10/2019  Spoke to Giorgi moran for CCM.       Updates to patient care team/ comments: UTD   Patient reported changes in medications: reports no changes   Med Adherence  Comment: reports taking medications as prescribed     Health Maintenance:   Reviewed   Patient will continue walking exercise regimen. He will continue to monitor diet. Pt relates he will call me if anything needed. Patient agrees to goal action plan.   Self-Management Abilities (patient reported)             1= least confident in [place]

## 2019-10-10 NOTE — TELEPHONE ENCOUNTER
Patient requesting refill of atorvastatin 20 mg, patient relates he is almost out of medication. Please send medication to [facility] pharmacy. Thank you.

## 2019-10-11 RX ORDER — SIMVASTATIN 20 MG
TABLET ORAL
Qty: 90 TABLET | Refills: 1 | Status: SHIPPED | OUTPATIENT
Start: 2019-10-11 | End: 2020-05-01

## 2019-10-11 NOTE — TELEPHONE ENCOUNTER
Refill passed per [facility] protocol  Hypertensive Medications  Protocol Criteria:  · Appointment scheduled in the past 6 months or in the next 3 months  · BMP or CMP in the past 12 months  · Creatinine result < 2  Recent Outpatient Visits

## 2019-10-14 ENCOUNTER — PATIENT OUTREACH (OUTPATIENT)
Dept: CASE MANAGEMENT | Age: 69
End: 2019-10-14

## 2019-10-14 NOTE — PROGRESS NOTES
Contacting patient to follow up on CCM for the month.  LMCB       Time with pt -  min  Chart review -2   min  Total time - 2 min

## 2019-10-31 PROCEDURE — 99490 CHRNC CARE MGMT STAFF 1ST 20: CPT

## 2019-11-08 ENCOUNTER — PATIENT OUTREACH (OUTPATIENT)
Dept: CASE MANAGEMENT | Age: 69
End: 2019-11-08

## 2019-11-08 DIAGNOSIS — I10 ESSENTIAL HYPERTENSION WITH GOAL BLOOD PRESSURE LESS THAN 140/90: ICD-10-CM

## 2019-11-08 DIAGNOSIS — E66.3 OVERWEIGHT (BMI 25.0-29.9): ICD-10-CM

## 2019-11-08 DIAGNOSIS — E78.2 MIXED HYPERLIPIDEMIA: ICD-10-CM

## 2019-11-08 NOTE — PROGRESS NOTES
Contacted [facility] in [facility] patient has refill on file. Pharmacist states patient tried refilling it too soon but is now available to be filled. They will process Rx. Pt may  Rx later today.     Time with pharmacy - 4 min  Chart review -3  mi

## 2019-11-08 NOTE — PROGRESS NOTES
11/8/2019  Spoke to Giorgi moran for CCM.       Updates to patient care team/ comments: UTD  Patient reported changes in medications: None  Med Adherence  Comment: Taking as directed    Health Maintenance: Reviewed with patient  Annual Physical due on 12/14/1953 Dec month  Reason For Follow Up: review progress and or barriers towards patients goals. Care managers interventions: Contacted pharmacy to verify Simvastatin refill concern.     Time Spent This Encounter Total: 19 min medical record review, telephone commu

## 2019-11-30 PROCEDURE — 99490 CHRNC CARE MGMT STAFF 1ST 20: CPT

## 2019-12-09 ENCOUNTER — PATIENT OUTREACH (OUTPATIENT)
Dept: CASE MANAGEMENT | Age: 69
End: 2019-12-09

## 2019-12-20 ENCOUNTER — PATIENT OUTREACH (OUTPATIENT)
Dept: CASE MANAGEMENT | Age: 69
End: 2019-12-20
Payer: MEDICARE

## 2019-12-20 DIAGNOSIS — E66.3 OVERWEIGHT (BMI 25.0-29.9): ICD-10-CM

## 2019-12-20 DIAGNOSIS — I10 ESSENTIAL HYPERTENSION WITH GOAL BLOOD PRESSURE LESS THAN 140/90: ICD-10-CM

## 2019-12-20 DIAGNOSIS — E78.2 MIXED HYPERLIPIDEMIA: ICD-10-CM

## 2019-12-20 NOTE — PROGRESS NOTES
Contacting patient to follow up on CCM for the month.  LMCB    Time with pt -  min  Chart review -2  min  Total time -2  min

## 2019-12-30 ENCOUNTER — TELEPHONE (OUTPATIENT)
Dept: ORTHOPEDICS CLINIC | Facility: CLINIC | Age: 69
End: 2019-12-30

## 2019-12-30 ENCOUNTER — PATIENT OUTREACH (OUTPATIENT)
Dept: CASE MANAGEMENT | Age: 69
End: 2019-12-30

## 2019-12-30 NOTE — PROGRESS NOTES
Pt called stated having Rt knee pain on going for several weeks and wanting to see Dr. Celine Dawn stated it being very difficult to get into see him and asked for assistance obtaining appointment. TE routed to Ortho.

## 2019-12-30 NOTE — TELEPHONE ENCOUNTER
Pt states he has RT  Leg pain onset for several weeks and would like to come in and see Dr. Sylvia Goff pt report having a PPO insurance and not requiring referral.

## 2019-12-31 PROCEDURE — 99490 CHRNC CARE MGMT STAFF 1ST 20: CPT

## 2020-01-21 ENCOUNTER — OFFICE VISIT (OUTPATIENT)
Dept: INTERNAL MEDICINE CLINIC | Facility: CLINIC | Age: 70
End: 2020-01-21
Payer: MEDICARE

## 2020-01-21 VITALS
HEIGHT: 68 IN | RESPIRATION RATE: 16 BRPM | DIASTOLIC BLOOD PRESSURE: 70 MMHG | HEART RATE: 98 BPM | SYSTOLIC BLOOD PRESSURE: 100 MMHG | WEIGHT: 205 LBS | BODY MASS INDEX: 31.07 KG/M2

## 2020-01-21 DIAGNOSIS — E66.3 OVERWEIGHT (BMI 25.0-29.9): ICD-10-CM

## 2020-01-21 DIAGNOSIS — F32.0 MAJOR DEPRESSIVE DISORDER, SINGLE EPISODE, MILD (HCC): ICD-10-CM

## 2020-01-21 DIAGNOSIS — E78.2 MIXED HYPERLIPIDEMIA: ICD-10-CM

## 2020-01-21 DIAGNOSIS — J43.1 PANLOBULAR EMPHYSEMA (HCC): ICD-10-CM

## 2020-01-21 DIAGNOSIS — I10 ESSENTIAL HYPERTENSION WITH GOAL BLOOD PRESSURE LESS THAN 140/90: Primary | ICD-10-CM

## 2020-01-21 PROCEDURE — G0463 HOSPITAL OUTPT CLINIC VISIT: HCPCS | Performed by: INTERNAL MEDICINE

## 2020-01-21 PROCEDURE — 99214 OFFICE O/P EST MOD 30 MIN: CPT | Performed by: INTERNAL MEDICINE

## 2020-01-21 NOTE — PROGRESS NOTES
Idalmis Yousif is a 68year old male. HPI:   1. Essential hypertension with goal <140/90    Patient has been following low salt diet and has been taking anti-hypertensive prescriptions as prescribed.  Blood pressure has been checked and is under good control a Cataract    • Diverticulosis large intestine w/o perforation or abscess w/o bleeding 11/8/2017   • High blood pressure    • High cholesterol    • History of tonsillitis     Tonsillectomy; per NG   • Internal hemorrhoids 11/8/2017   • MGD (meibomian gland d prescribed and to follow a low salt diet as discussed. Regular exercise at least 3 times weekly will help to curb one's appetite, control weight and lead to better blood pressure control.     2. Mixed hyperlipidemia    Patient instructed to take cholesterol

## 2020-01-27 ENCOUNTER — PATIENT OUTREACH (OUTPATIENT)
Dept: CASE MANAGEMENT | Age: 70
End: 2020-01-27
Payer: MEDICARE

## 2020-01-27 DIAGNOSIS — E78.2 MIXED HYPERLIPIDEMIA: ICD-10-CM

## 2020-01-27 DIAGNOSIS — E66.3 OVERWEIGHT (BMI 25.0-29.9): ICD-10-CM

## 2020-01-27 DIAGNOSIS — I10 ESSENTIAL HYPERTENSION WITH GOAL BLOOD PRESSURE LESS THAN 140/90: ICD-10-CM

## 2020-01-27 NOTE — PROGRESS NOTES
1/27/2020  Spoke to Giorgi moran for CCM.       Updates to patient care team/ comments: UTD  Patient reported changes in medications: None  Med Adherence  Comment: Taking as directed    Health Maintenance:Reviewed and encouraged Medicare Px   Annual Physical due on Manager Follow Up: One month    Reason For Follow Up: review progress and or barriers towards patients goals. Care managers interventions: Praised for weight loss, encouraged to continue towards eating more fresh fruits and vegetables.       Time Spent

## 2020-01-31 PROCEDURE — 99490 CHRNC CARE MGMT STAFF 1ST 20: CPT

## 2020-02-24 ENCOUNTER — PATIENT OUTREACH (OUTPATIENT)
Dept: CASE MANAGEMENT | Age: 70
End: 2020-02-24
Payer: MEDICARE

## 2020-02-24 DIAGNOSIS — E78.2 MIXED HYPERLIPIDEMIA: ICD-10-CM

## 2020-02-24 DIAGNOSIS — I10 ESSENTIAL HYPERTENSION WITH GOAL BLOOD PRESSURE LESS THAN 140/90: ICD-10-CM

## 2020-02-24 DIAGNOSIS — E66.1 CLASS 1 DRUG-INDUCED OBESITY WITH BODY MASS INDEX (BMI) OF 31.0 TO 31.9 IN ADULT, UNSPECIFIED WHETHER SERIOUS COMORBIDITY PRESENT: ICD-10-CM

## 2020-02-24 NOTE — PROGRESS NOTES
2/24/2020  Spoke to Lucinda Ramon for CCM. Updates to patient care team/ comments: UTD  Patient reported changes in medications: None  Med Adherence  Comment: Taking as directed    Health Maintenance:  reviewed with patient, encouraged medicare px.  Stated he wi confidence: 4    Care Manager Follow Up: One month    Reason For Follow Up: review progress and or barriers towards patients goals.      Care managers interventions:   Encouraged three low carb balanced meals along with 20-30 minutes of daily exercise and s

## 2020-02-24 NOTE — PROGRESS NOTES
Contacting patient to follow up on CCM for the month.  LMCB       Time with pt -  min  Chart review - 2 min  Total time -2  min  Total Monthly time- 2 min

## 2020-02-29 PROCEDURE — 99490 CHRNC CARE MGMT STAFF 1ST 20: CPT

## 2020-03-01 NOTE — TELEPHONE ENCOUNTER
Please review; protocol failed.  D/t labs  Requested Prescriptions     Pending Prescriptions Disp Refills   • LISINOPRIL 10 MG Oral Tab [Pharmacy Med Name: LISINOPRIL 10MG TABLETS] 90 tablet 3     Sig: TAKE 1 TABLET BY MOUTH ONCE DAILY         Recent Visits

## 2020-03-02 RX ORDER — LISINOPRIL 10 MG/1
TABLET ORAL
Qty: 90 TABLET | Refills: 1 | Status: SHIPPED | OUTPATIENT
Start: 2020-03-02 | End: 2020-08-24

## 2020-03-30 ENCOUNTER — PATIENT OUTREACH (OUTPATIENT)
Dept: CASE MANAGEMENT | Age: 70
End: 2020-03-30
Payer: MEDICARE

## 2020-03-30 DIAGNOSIS — I10 ESSENTIAL HYPERTENSION WITH GOAL BLOOD PRESSURE LESS THAN 140/90: ICD-10-CM

## 2020-03-30 DIAGNOSIS — E78.2 MIXED HYPERLIPIDEMIA: ICD-10-CM

## 2020-03-30 DIAGNOSIS — E66.1 CLASS 1 DRUG-INDUCED OBESITY WITH BODY MASS INDEX (BMI) OF 31.0 TO 31.9 IN ADULT, UNSPECIFIED WHETHER SERIOUS COMORBIDITY PRESENT: ICD-10-CM

## 2020-03-30 NOTE — PROGRESS NOTES
Contacting patient to follow up on CCM for the month.  LMCB    Time with pt -  min  Chart review - 2 min  Total time - 2 min  Total Monthly time-2  min

## 2020-03-31 PROCEDURE — 99490 CHRNC CARE MGMT STAFF 1ST 20: CPT

## 2020-03-31 NOTE — PROGRESS NOTES
3/31/2020  Spoke to Giorgi moran for CCM. Updates to patient care team/ comments: UTD  Patient reported changes in medications: None  Med Adherence  Comment: Taking as directed    Health Maintenance:  Reviewed with patient. Encouraged updating.   Annual Raul 5  Care Manager Follow Up: One month    Reason For Follow Up: review progress and or barriers towards patients goals. Care managers interventions: Discussed Covid 19 precautions at length. Praised patient for keeping active and walking daily.     Tamy Wasserman

## 2020-04-23 ENCOUNTER — PATIENT OUTREACH (OUTPATIENT)
Dept: CASE MANAGEMENT | Age: 70
End: 2020-04-23

## 2020-04-23 DIAGNOSIS — E66.1 CLASS 1 DRUG-INDUCED OBESITY WITH BODY MASS INDEX (BMI) OF 31.0 TO 31.9 IN ADULT, UNSPECIFIED WHETHER SERIOUS COMORBIDITY PRESENT: ICD-10-CM

## 2020-04-23 DIAGNOSIS — I10 ESSENTIAL HYPERTENSION WITH GOAL BLOOD PRESSURE LESS THAN 140/90: ICD-10-CM

## 2020-04-23 DIAGNOSIS — E78.2 MIXED HYPERLIPIDEMIA: ICD-10-CM

## 2020-04-23 NOTE — PROGRESS NOTES
Contacting patient to follow up on CCM for the month.  LMCB     Time with pt -  min  Chart review -2  min  Total time -2  min  Total Monthly time-2  min

## 2020-04-28 NOTE — PROGRESS NOTES
4/28/2020  Spoke to Giorgi moran for CCM. Updates to patient care team/ comments: UTD  Patient reported changes in medications: None  Med Adherence  Comment: Taking as directed    Health Maintenance:  UTD, reviewed with patient.  Medicare Px is scheduled 5/19/2 patients goals. Care managers interventions: Discussed COVID 19 precautions. Praised patient for staying active and going on daily walks, keeping busy with small projects and keeping in touch with friends and family.      Encouraged three balanced meals

## 2020-04-29 ENCOUNTER — PATIENT OUTREACH (OUTPATIENT)
Dept: CASE MANAGEMENT | Age: 70
End: 2020-04-29

## 2020-04-29 NOTE — PROGRESS NOTES
Contacted the billing department per patients request he provided me with invoice # [id number]. I was advised this bill is for [facility] and bill was applied to deductible.   [facility] billing will contact patient to go over the bill

## 2020-04-30 PROCEDURE — 99490 CHRNC CARE MGMT STAFF 1ST 20: CPT

## 2020-05-01 RX ORDER — SIMVASTATIN 20 MG
TABLET ORAL
Qty: 90 TABLET | Refills: 1 | Status: SHIPPED | OUTPATIENT
Start: 2020-05-01 | End: 2020-10-30

## 2020-05-07 ENCOUNTER — VIRTUAL PHONE E/M (OUTPATIENT)
Dept: INTERNAL MEDICINE CLINIC | Facility: CLINIC | Age: 70
End: 2020-05-07
Payer: MEDICARE

## 2020-05-07 DIAGNOSIS — E78.2 MIXED HYPERLIPIDEMIA: ICD-10-CM

## 2020-05-07 DIAGNOSIS — I10 ESSENTIAL HYPERTENSION WITH GOAL BLOOD PRESSURE LESS THAN 140/90: Primary | ICD-10-CM

## 2020-05-07 DIAGNOSIS — Z12.5 SCREENING FOR PROSTATE CANCER: ICD-10-CM

## 2020-05-07 DIAGNOSIS — E66.09 CLASS 1 OBESITY DUE TO EXCESS CALORIES WITHOUT SERIOUS COMORBIDITY WITH BODY MASS INDEX (BMI) OF 31.0 TO 31.9 IN ADULT: ICD-10-CM

## 2020-05-07 PROBLEM — E66.811 CLASS 1 OBESITY DUE TO EXCESS CALORIES WITHOUT SERIOUS COMORBIDITY WITH BODY MASS INDEX (BMI) OF 31.0 TO 31.9 IN ADULT: Status: ACTIVE | Noted: 2019-01-29

## 2020-05-07 PROCEDURE — 99442 PHONE E/M BY PHYS 11-20 MIN: CPT | Performed by: INTERNAL MEDICINE

## 2020-05-07 NOTE — PROGRESS NOTES
Patient ID: Carlos Sandra is a 68year old male. 1. Essential hypertension with goal blood pressure less than 140/90    Patient has been following low salt diet and has been taking anti-hypertensive prescriptions as prescribed.  Blood pressure has been ch colonoscopic polypectomy 11/8/2017   • Synovial cyst of lumbar spine 8/20/2018       Past Surgical History:   Procedure Laterality Date   • CATARACT EXTRACTION W/  INTRAOCULAR LENS IMPLANT Left 02-    Phaco w/ PC IOL; per NG   • CATARACT EXTRACTION Relationships      Social connections:        Talks on phone: Not on file        Gets together: Not on file        Attends [employer] service: Not on file        Active member of club or organization: Not on file        Attends meetings of clubs or [place] total and saturated fat and eating a diet rich in fruits and vegetables.  Discussed decreasing alcohol consumption Try to exercise at least 3 times weekly to build strength, burn calories and help to achieve ideal body weight.    - TSH W REFLEX TO FREE T4;

## 2020-05-26 ENCOUNTER — PATIENT OUTREACH (OUTPATIENT)
Dept: CASE MANAGEMENT | Age: 70
End: 2020-05-26

## 2020-05-26 DIAGNOSIS — F32.0 MAJOR DEPRESSIVE DISORDER, SINGLE EPISODE, MILD (HCC): ICD-10-CM

## 2020-05-26 DIAGNOSIS — I10 ESSENTIAL HYPERTENSION WITH GOAL BLOOD PRESSURE LESS THAN 140/90: ICD-10-CM

## 2020-05-26 DIAGNOSIS — E78.2 MIXED HYPERLIPIDEMIA: ICD-10-CM

## 2020-05-26 DIAGNOSIS — E66.09 CLASS 1 OBESITY DUE TO EXCESS CALORIES WITHOUT SERIOUS COMORBIDITY WITH BODY MASS INDEX (BMI) OF 31.0 TO 31.9 IN ADULT: ICD-10-CM

## 2020-05-26 NOTE — PROGRESS NOTES
5/26/2020  Spoke to Giorgi moran for CCM.       Updates to patient care team/ comments: UTD  Patient reported changes in medications: None  Med Adherence  Comment: Taking as directed    Health Maintenance:Reviewed and encouraged updating once public health concerns and or barriers towards patients goals. Care managers interventions: Discussed fasting labs. Encouraged three balanced meals along with 20-30 minutes of daily exercise and stretching as tolerated.       Time Spent This Encounter Total: 26 min medica

## 2020-05-31 PROCEDURE — 99490 CHRNC CARE MGMT STAFF 1ST 20: CPT

## 2020-06-08 ENCOUNTER — LAB ENCOUNTER (OUTPATIENT)
Dept: LAB | Age: 70
End: 2020-06-08
Attending: INTERNAL MEDICINE
Payer: MEDICARE

## 2020-06-08 DIAGNOSIS — Z12.5 SCREENING FOR PROSTATE CANCER: ICD-10-CM

## 2020-06-08 DIAGNOSIS — I10 ESSENTIAL HYPERTENSION WITH GOAL BLOOD PRESSURE LESS THAN 140/90: ICD-10-CM

## 2020-06-08 DIAGNOSIS — E78.2 MIXED HYPERLIPIDEMIA: ICD-10-CM

## 2020-06-08 PROCEDURE — 80053 COMPREHEN METABOLIC PANEL: CPT

## 2020-06-08 PROCEDURE — 84443 ASSAY THYROID STIM HORMONE: CPT

## 2020-06-08 PROCEDURE — 81001 URINALYSIS AUTO W/SCOPE: CPT

## 2020-06-08 PROCEDURE — 36415 COLL VENOUS BLD VENIPUNCTURE: CPT

## 2020-06-08 PROCEDURE — 85025 COMPLETE CBC W/AUTO DIFF WBC: CPT

## 2020-06-08 PROCEDURE — 80061 LIPID PANEL: CPT

## 2020-06-23 ENCOUNTER — OFFICE VISIT (OUTPATIENT)
Dept: INTERNAL MEDICINE CLINIC | Facility: CLINIC | Age: 70
End: 2020-06-23
Payer: MEDICARE

## 2020-06-23 VITALS
HEIGHT: 69 IN | BODY MASS INDEX: 31.55 KG/M2 | SYSTOLIC BLOOD PRESSURE: 106 MMHG | WEIGHT: 213 LBS | DIASTOLIC BLOOD PRESSURE: 72 MMHG | RESPIRATION RATE: 16 BRPM | HEART RATE: 99 BPM

## 2020-06-23 DIAGNOSIS — Z00.00 ENCOUNTER FOR ANNUAL HEALTH EXAMINATION: Primary | ICD-10-CM

## 2020-06-23 DIAGNOSIS — F17.210 CIGARETTE NICOTINE DEPENDENCE WITHOUT COMPLICATION: ICD-10-CM

## 2020-06-23 DIAGNOSIS — I10 ESSENTIAL HYPERTENSION WITH GOAL BLOOD PRESSURE LESS THAN 140/90: ICD-10-CM

## 2020-06-23 DIAGNOSIS — E78.2 MIXED HYPERLIPIDEMIA: ICD-10-CM

## 2020-06-23 DIAGNOSIS — Z12.11 SCREENING FOR COLON CANCER: ICD-10-CM

## 2020-06-23 DIAGNOSIS — Z23 NEED FOR VACCINATION: ICD-10-CM

## 2020-06-23 PROBLEM — K57.30 DIVERTICULOSIS LARGE INTESTINE W/O PERFORATION OR ABSCESS W/O BLEEDING: Status: RESOLVED | Noted: 2017-11-08 | Resolved: 2020-06-23

## 2020-06-23 PROBLEM — Z98.890 S/P COLONOSCOPIC POLYPECTOMY: Status: RESOLVED | Noted: 2017-11-08 | Resolved: 2020-06-23

## 2020-06-23 PROCEDURE — G0439 PPPS, SUBSEQ VISIT: HCPCS | Performed by: PHYSICIAN ASSISTANT

## 2020-06-23 PROCEDURE — 90732 PPSV23 VACC 2 YRS+ SUBQ/IM: CPT | Performed by: PHYSICIAN ASSISTANT

## 2020-06-23 PROCEDURE — G0009 ADMIN PNEUMOCOCCAL VACCINE: HCPCS | Performed by: PHYSICIAN ASSISTANT

## 2020-06-23 NOTE — PATIENT INSTRUCTIONS
Debra Lara's SCREENING SCHEDULE   Tests on this list are recommended by your physician but may not be covered, or covered at this frequency, by your insurer. Please check with your insurance carrier before scheduling to verify coverage.     PREVENTATIVE S Colonoscopy due on 11/08/2020 Update [facility] if applicable    Flex Sigmoidoscopy Screen  Covered every 5 years No results found for this or any previous visit. No flowsheet data found.      Fecal Occult Blood   Covered Annually No results found f cover unless Medically needed    Zoster (Not covered by Medicare Part B) No orders found for this or any previous visit. This may be covered with your pharmacy  prescription benefits     Recommended Websites for Advanced Directives    [email or web address]

## 2020-06-23 NOTE — PROGRESS NOTES
HPI:   Mick Lancaster is a 68year old male who presents for a Medicare Subsequent Annual Wellness visit (Pt already had Initial Annual Wellness).     Doing well at this time, no complaints  His last annual assessment has been over 1 year: Annual Physical due Salty Loja MD as PCP - MSSP  Damien Dao MD (GASTROENTEROLOGY)  Tatiana Jasmine MD (SURGERY, ORTHOPEDIC)  Kenneth Denton DO (Physical Medicine)  Concepcion Kwok, PT as Physical Therapist (Physical Therapy)  Selwyn Brooks PTA as daily.       MEDICAL INFORMATION:   He  has a past medical history of Cataract, Diverticulosis large intestine w/o perforation or abscess w/o bleeding (11/8/2017), High blood pressure, High cholesterol, History of tonsillitis, Internal hemorrhoids (11/8/20 (Required for AWV/SWV)    Hearing Screening    Screening Method:  Questionnaire  I have a problem hearing over the telephone:  No I have trouble following the conversations when two or more people are talking at the same time:  No   I have trouble Jocelynn García Penis normal.   Musculoskeletal: Normal range of motion. Neurological: He is alert and oriented to person, place, and time. Skin: Skin is warm and dry. Psychiatric: He has a normal mood and affect.  His behavior is normal. Judgment and thought conten maintain a good energy level?: Appropriate Exercise; Other  How would you describe your daily physical activity?: Moderate  How would you describe your current health state?: Good  How do you maintain positive mental well-being?: Social Interaction; Visiting received Factor VIII or IX concentrates   Clients of institutions for the mentally retarded   Persons who live in the same house as a HepB virus carrier   Homosexual men   Illicit injectable drug abusers     Tetanus Toxoid  Only covered with a cut with met

## 2020-06-24 ENCOUNTER — PATIENT OUTREACH (OUTPATIENT)
Dept: CASE MANAGEMENT | Age: 70
End: 2020-06-24

## 2020-06-24 DIAGNOSIS — E78.2 MIXED HYPERLIPIDEMIA: ICD-10-CM

## 2020-06-24 DIAGNOSIS — I10 ESSENTIAL HYPERTENSION WITH GOAL BLOOD PRESSURE LESS THAN 140/90: ICD-10-CM

## 2020-06-24 DIAGNOSIS — E66.09 CLASS 1 OBESITY DUE TO EXCESS CALORIES WITHOUT SERIOUS COMORBIDITY WITH BODY MASS INDEX (BMI) OF 31.0 TO 31.9 IN ADULT: ICD-10-CM

## 2020-06-24 NOTE — PROGRESS NOTES
6/24/2020  Spoke to Giorgi moran for CCM.       Updates to patient care team/ comments: UTD  Patient reported changes in medications: None  Med Adherence  Comment: Taking as directed    Health Maintenance:  UTD  Colonoscopy due on 11/08/2020  Influenza Vaccine(Seaso • Ketones Urine 06/08/2020 Negative  Negative mg/dL   • Blood Urine 06/08/2020 Negative  Negative   • pH Urine 06/08/2020 6.0  5.0 - 8.0   • Protein Urine 06/08/2020 Negative  Negative mg/dL   • Urobilinogen Urine 06/08/2020 <2.0  <2.0   • Nitrite Urine 13.0 - 17.5 g/dL   • HCT 06/08/2020 43.2  39.0 - 53.0 %   • MCV 06/08/2020 94.7  80.0 - 100.0 fL   • MCH 06/08/2020 32.5  26.0 - 34.0 pg   • MCHC 06/08/2020 34.3  31.0 - 37.0 g/dL   • RDW-SD 06/08/2020 44.2  35.1 - 46.3 fL   • RDW 06/08/2020 12.7  11.0 - Santosh     Time Spent This Encounter Total: 30 min medical record review, telephone communication, care plan updates where needed, education, goals and action plan recreation/update. Provided acknowledgment and validation to patient's concerns.    Monthly

## 2020-06-30 PROCEDURE — 99490 CHRNC CARE MGMT STAFF 1ST 20: CPT

## 2020-07-21 ENCOUNTER — PATIENT OUTREACH (OUTPATIENT)
Dept: CASE MANAGEMENT | Age: 70
End: 2020-07-21

## 2020-07-21 DIAGNOSIS — E78.2 MIXED HYPERLIPIDEMIA: ICD-10-CM

## 2020-07-21 DIAGNOSIS — F32.0 MAJOR DEPRESSIVE DISORDER, SINGLE EPISODE, MILD (HCC): ICD-10-CM

## 2020-07-21 DIAGNOSIS — E66.09 CLASS 1 OBESITY DUE TO EXCESS CALORIES WITHOUT SERIOUS COMORBIDITY WITH BODY MASS INDEX (BMI) OF 31.0 TO 31.9 IN ADULT: ICD-10-CM

## 2020-07-21 DIAGNOSIS — I10 ESSENTIAL HYPERTENSION WITH GOAL BLOOD PRESSURE LESS THAN 140/90: ICD-10-CM

## 2020-07-21 NOTE — PROGRESS NOTES
7/21/2020  Spoke to Giorgi moran for CCM. Updates to patient care team/ comments: UTD  Patient reported changes in medications: None  Med Adherence  Comment: Taking as directed    Health Maintenance:  UTD, reviewed with patient.    Colonoscopy due on 11/08/2020 confident in achieving goal, 5= very confident               - confidence: 4    Care Manager Follow Up: One month    Reason For Follow Up: review progress and or barriers towards patients goals. Care managers interventions: Offered blue moon services.

## 2020-07-31 PROCEDURE — 99490 CHRNC CARE MGMT STAFF 1ST 20: CPT

## 2020-08-24 RX ORDER — LISINOPRIL 10 MG/1
TABLET ORAL
Qty: 90 TABLET | Refills: 1 | Status: SHIPPED | OUTPATIENT
Start: 2020-08-24 | End: 2020-11-09

## 2020-08-27 ENCOUNTER — PATIENT OUTREACH (OUTPATIENT)
Dept: CASE MANAGEMENT | Age: 70
End: 2020-08-27

## 2020-08-27 DIAGNOSIS — I10 ESSENTIAL HYPERTENSION WITH GOAL BLOOD PRESSURE LESS THAN 140/90: ICD-10-CM

## 2020-08-27 DIAGNOSIS — E78.2 MIXED HYPERLIPIDEMIA: ICD-10-CM

## 2020-08-27 DIAGNOSIS — E66.09 CLASS 1 OBESITY DUE TO EXCESS CALORIES WITHOUT SERIOUS COMORBIDITY WITH BODY MASS INDEX (BMI) OF 31.0 TO 31.9 IN ADULT: ICD-10-CM

## 2020-08-27 NOTE — PROGRESS NOTES
8/27/2020  Spoke to Giorgi moran for CCM. Updates to patient care team/ comments: UTD  Patient reported changes in medications: None  Med Adherence  Comment: Taking as directed    Health Maintenance:  UTD, reviewed with patient.    Colonoscopy due on 11/08/2020 review progress and or barriers towards patients goals. Care managers interventions: Encouraged three balanced meals along with 20-30 minutes of daily exercise and stretching as tolerated.  Discussed getting Flu vaccine and Prevnar 13 as well as TDAP as

## 2020-08-31 PROCEDURE — 99490 CHRNC CARE MGMT STAFF 1ST 20: CPT

## 2020-09-25 ENCOUNTER — PATIENT OUTREACH (OUTPATIENT)
Dept: CASE MANAGEMENT | Age: 70
End: 2020-09-25

## 2020-09-30 ENCOUNTER — PATIENT OUTREACH (OUTPATIENT)
Dept: CASE MANAGEMENT | Age: 70
End: 2020-09-30

## 2020-09-30 DIAGNOSIS — E78.2 MIXED HYPERLIPIDEMIA: ICD-10-CM

## 2020-09-30 DIAGNOSIS — E66.09 CLASS 1 OBESITY DUE TO EXCESS CALORIES WITHOUT SERIOUS COMORBIDITY WITH BODY MASS INDEX (BMI) OF 31.0 TO 31.9 IN ADULT: ICD-10-CM

## 2020-09-30 DIAGNOSIS — I10 ESSENTIAL HYPERTENSION WITH GOAL BLOOD PRESSURE LESS THAN 140/90: ICD-10-CM

## 2020-09-30 PROCEDURE — 99490 CHRNC CARE MGMT STAFF 1ST 20: CPT

## 2020-09-30 NOTE — PROGRESS NOTES
Contacting patient to follow up on CCM for the month.  LMCB    Time with pt -  min  Chart review -1  min  Total time - 1 min  Total Monthly time-3  min

## 2020-09-30 NOTE — PROGRESS NOTES
9/30/2020  Spoke to Giorgi moran for CCM. Updates to patient care team/ comments: UTD  Patient reported changes in medications: None  Med Adherence  Comment: Taking as directed    Health Maintenance:  UTD, reviewed with patient.    Colonoscopy due on 11/08/2020 Suggested he limit  watching the news and you tube videos to decrease frustration.     Time Spent This Encounter Total: 36 min medical record review, telephone communication, care plan updates where needed, education, goals and action plan recreation/update

## 2020-10-27 ENCOUNTER — PATIENT OUTREACH (OUTPATIENT)
Dept: CASE MANAGEMENT | Age: 70
End: 2020-10-27

## 2020-10-27 NOTE — PROGRESS NOTES
Spoke to pt reports he is overall well. Stated he missed his appointment with Dr. Sarai Gallegos. Pt is really upset over it stated he has been going to see Dr. Ainsley Pratt for 35 years and has never missed the appointment.  Relates he always schedules appointment o

## 2020-10-30 RX ORDER — SIMVASTATIN 20 MG
TABLET ORAL
Qty: 90 TABLET | Refills: 1 | Status: SHIPPED | OUTPATIENT
Start: 2020-10-30 | End: 2020-11-09

## 2020-11-04 ENCOUNTER — PATIENT OUTREACH (OUTPATIENT)
Dept: CASE MANAGEMENT | Age: 70
End: 2020-11-04

## 2020-11-04 DIAGNOSIS — E78.2 MIXED HYPERLIPIDEMIA: ICD-10-CM

## 2020-11-04 DIAGNOSIS — E66.09 CLASS 1 OBESITY DUE TO EXCESS CALORIES WITHOUT SERIOUS COMORBIDITY WITH BODY MASS INDEX (BMI) OF 31.0 TO 31.9 IN ADULT: ICD-10-CM

## 2020-11-04 DIAGNOSIS — I10 ESSENTIAL HYPERTENSION WITH GOAL BLOOD PRESSURE LESS THAN 140/90: ICD-10-CM

## 2020-11-04 NOTE — PROGRESS NOTES
11/4/2020  Spoke to Giorgi moran for CCM.       Updates to patient care team/ comments: UTD  Patient reported changes in medications: None  Med Adherence  Comment: Taking as directed    Health Maintenance: Reviewed with pt  Influenza Vaccine(1) due on 10/01/2020  Co number to follow up on Colonoscopy due this year. Encouraged three balanced meals along with 20-30 minutes of daily exercise and stretching as tolerated. Discussed getting Flu vaccine.     Time Spent This Encounter Total:29  min medical record review, telep

## 2020-11-09 ENCOUNTER — OFFICE VISIT (OUTPATIENT)
Dept: INTERNAL MEDICINE CLINIC | Facility: CLINIC | Age: 70
End: 2020-11-09
Payer: MEDICARE

## 2020-11-09 VITALS
WEIGHT: 208 LBS | HEART RATE: 99 BPM | BODY MASS INDEX: 30.81 KG/M2 | SYSTOLIC BLOOD PRESSURE: 109 MMHG | RESPIRATION RATE: 16 BRPM | DIASTOLIC BLOOD PRESSURE: 70 MMHG | HEIGHT: 69 IN

## 2020-11-09 DIAGNOSIS — F32.0 MAJOR DEPRESSIVE DISORDER, SINGLE EPISODE, MILD (HCC): ICD-10-CM

## 2020-11-09 DIAGNOSIS — Z23 NEED FOR VACCINATION: ICD-10-CM

## 2020-11-09 DIAGNOSIS — I10 ESSENTIAL HYPERTENSION WITH GOAL BLOOD PRESSURE LESS THAN 140/90: Primary | ICD-10-CM

## 2020-11-09 DIAGNOSIS — E78.2 MIXED HYPERLIPIDEMIA: ICD-10-CM

## 2020-11-09 DIAGNOSIS — E66.09 CLASS 1 OBESITY DUE TO EXCESS CALORIES WITHOUT SERIOUS COMORBIDITY WITH BODY MASS INDEX (BMI) OF 31.0 TO 31.9 IN ADULT: ICD-10-CM

## 2020-11-09 PROCEDURE — G0008 ADMIN INFLUENZA VIRUS VAC: HCPCS | Performed by: INTERNAL MEDICINE

## 2020-11-09 PROCEDURE — 99214 OFFICE O/P EST MOD 30 MIN: CPT | Performed by: INTERNAL MEDICINE

## 2020-11-09 PROCEDURE — 90662 IIV NO PRSV INCREASED AG IM: CPT | Performed by: INTERNAL MEDICINE

## 2020-11-09 PROCEDURE — G0463 HOSPITAL OUTPT CLINIC VISIT: HCPCS | Performed by: INTERNAL MEDICINE

## 2020-11-09 RX ORDER — SIMVASTATIN 20 MG
20 TABLET ORAL EVERY EVENING
Qty: 90 TABLET | Refills: 3 | Status: SHIPPED | OUTPATIENT
Start: 2020-11-09 | End: 2021-02-09

## 2020-11-09 RX ORDER — LISINOPRIL 10 MG/1
10 TABLET ORAL DAILY
Qty: 90 TABLET | Refills: 3 | Status: SHIPPED | OUTPATIENT
Start: 2020-11-09 | End: 2022-01-07

## 2020-11-09 NOTE — PROGRESS NOTES
Roger Alcala is a 68year old male. HPI:   1. Essential hypertension with goal <140/90    Patient has been following low salt diet and has been taking anti-hypertensive prescriptions as prescribed.  Blood pressure has been checked and is under good control a bleeding 11/8/2017   • High blood pressure    • High cholesterol    • History of tonsillitis     Tonsillectomy; per NG   • Internal hemorrhoids 11/8/2017   • MGD (meibomian gland dysfunction) 2011    OU; per NG   • Obesity, unspecified     per NG   • Other weekly will help to curb one's appetite, control weight and lead to better blood pressure control.     2. Mixed hyperlipidemia    Patient instructed to take cholesterol lowering medications as prescribed and to continue to follow a low fat, low cholesterol

## 2020-11-16 ENCOUNTER — PATIENT OUTREACH (OUTPATIENT)
Dept: CASE MANAGEMENT | Age: 70
End: 2020-11-16

## 2020-11-16 ENCOUNTER — TELEPHONE (OUTPATIENT)
Dept: INTERNAL MEDICINE CLINIC | Facility: CLINIC | Age: 70
End: 2020-11-16

## 2020-11-16 NOTE — TELEPHONE ENCOUNTER
Pt called states he saw Dr. Savana Arthur on 11/09/2020 for a follow up. Pt noticed he was scheduled an appointment with Dr. Moises Ormond when he left the appointment.  Pt would like to know why he needs to see a Cardiologist.     Pt also would like to follow up $25 co

## 2020-11-16 NOTE — PROGRESS NOTES
Pt called states he saw Dr. Lexa Guerrero on 11/09/2020 for a follow up. Pt noticed he was scheduled an appointment with Dr. Yann Courtney when he left the appointment.  Pt would like to know why he needs to see a Cardiologist. Reviewed providers notes, I was not able to

## 2020-11-18 NOTE — TELEPHONE ENCOUNTER
Not sure why Liliana Samano showed up on his schedule. Call and tell him he doesn't need to see at this time. BTW the missed visit charge has been cancelled I was told.

## 2020-11-30 PROCEDURE — 99490 CHRNC CARE MGMT STAFF 1ST 20: CPT

## 2020-12-16 ENCOUNTER — PATIENT OUTREACH (OUTPATIENT)
Dept: CASE MANAGEMENT | Age: 70
End: 2020-12-16

## 2020-12-16 DIAGNOSIS — I10 ESSENTIAL HYPERTENSION WITH GOAL BLOOD PRESSURE LESS THAN 140/90: ICD-10-CM

## 2020-12-16 DIAGNOSIS — E78.2 MIXED HYPERLIPIDEMIA: ICD-10-CM

## 2020-12-16 DIAGNOSIS — E66.09 CLASS 1 OBESITY DUE TO EXCESS CALORIES WITHOUT SERIOUS COMORBIDITY WITH BODY MASS INDEX (BMI) OF 31.0 TO 31.9 IN ADULT: ICD-10-CM

## 2020-12-16 DIAGNOSIS — F32.0 MAJOR DEPRESSIVE DISORDER, SINGLE EPISODE, MILD (HCC): ICD-10-CM

## 2020-12-16 DIAGNOSIS — F17.210 CIGARETTE NICOTINE DEPENDENCE WITHOUT COMPLICATION: ICD-10-CM

## 2020-12-16 DIAGNOSIS — E66.3 OVERWEIGHT (BMI 25.0-29.9): ICD-10-CM

## 2020-12-16 NOTE — PROGRESS NOTES
12/16/2020  Spoke to Giorgi moran for CCM. Updates to patient care team/ comments: UTD  Patient reported changes in medications: None  Med Adherence  Comment: Taking as directed    Health Maintenance: Reviewed with patient.    FIT Colorectal Screening due on 12 24  min medical record review, telephone communication, care plan updates where needed, education, goals and action plan recreation/update. Provided acknowledgment and validation to patient's concerns.    Monthly Minute Total including today:26  Physical as

## 2020-12-31 PROCEDURE — 99490 CHRNC CARE MGMT STAFF 1ST 20: CPT

## 2021-02-06 DIAGNOSIS — Z23 NEED FOR VACCINATION: ICD-10-CM

## 2021-02-09 RX ORDER — SIMVASTATIN 20 MG
TABLET ORAL
Qty: 90 TABLET | Refills: 3 | Status: SHIPPED | OUTPATIENT
Start: 2021-02-09 | End: 2022-01-07

## 2021-03-03 ENCOUNTER — PATIENT OUTREACH (OUTPATIENT)
Dept: CASE MANAGEMENT | Age: 71
End: 2021-03-03

## 2021-03-03 DIAGNOSIS — I10 ESSENTIAL HYPERTENSION WITH GOAL BLOOD PRESSURE LESS THAN 140/90: ICD-10-CM

## 2021-03-03 DIAGNOSIS — E66.09 CLASS 1 OBESITY DUE TO EXCESS CALORIES WITHOUT SERIOUS COMORBIDITY WITH BODY MASS INDEX (BMI) OF 31.0 TO 31.9 IN ADULT: ICD-10-CM

## 2021-03-03 DIAGNOSIS — E78.2 MIXED HYPERLIPIDEMIA: ICD-10-CM

## 2021-03-03 DIAGNOSIS — F17.210 CIGARETTE NICOTINE DEPENDENCE WITHOUT COMPLICATION: ICD-10-CM

## 2021-03-17 ENCOUNTER — OFFICE VISIT (OUTPATIENT)
Dept: INTERNAL MEDICINE CLINIC | Facility: CLINIC | Age: 71
End: 2021-03-17
Payer: MEDICARE

## 2021-03-17 VITALS
WEIGHT: 209 LBS | DIASTOLIC BLOOD PRESSURE: 79 MMHG | HEIGHT: 69 IN | SYSTOLIC BLOOD PRESSURE: 118 MMHG | HEART RATE: 91 BPM | BODY MASS INDEX: 30.96 KG/M2 | RESPIRATION RATE: 16 BRPM

## 2021-03-17 DIAGNOSIS — F32.0 MAJOR DEPRESSIVE DISORDER, SINGLE EPISODE, MILD (HCC): ICD-10-CM

## 2021-03-17 DIAGNOSIS — E66.09 CLASS 1 OBESITY DUE TO EXCESS CALORIES WITHOUT SERIOUS COMORBIDITY WITH BODY MASS INDEX (BMI) OF 31.0 TO 31.9 IN ADULT: ICD-10-CM

## 2021-03-17 DIAGNOSIS — E78.2 MIXED HYPERLIPIDEMIA: ICD-10-CM

## 2021-03-17 DIAGNOSIS — I10 ESSENTIAL HYPERTENSION WITH GOAL BLOOD PRESSURE LESS THAN 140/90: Primary | ICD-10-CM

## 2021-03-17 PROBLEM — I73.9 PERIPHERAL VASCULAR DISEASE (HCC): Status: ACTIVE | Noted: 2021-03-17

## 2021-03-17 PROBLEM — G63 POLYNEUROPATHY IN OTHER DISEASES CLASSIFIED ELSEWHERE (HCC): Status: ACTIVE | Noted: 2021-03-17

## 2021-03-17 PROBLEM — I73.9 PERIPHERAL VASCULAR DISEASE: Status: ACTIVE | Noted: 2021-03-17

## 2021-03-17 PROCEDURE — 99214 OFFICE O/P EST MOD 30 MIN: CPT | Performed by: INTERNAL MEDICINE

## 2021-03-17 NOTE — PROGRESS NOTES
Arsh Walters is a 65year old male. HPI:     1. Essential hypertension with goal <140/90    Patient has been following low salt diet and has been taking anti-hypertensive prescriptions as prescribed.  Blood pressure has been checked and is under good control • High blood pressure    • High cholesterol    • History of tonsillitis     Tonsillectomy; per NG   • Internal hemorrhoids 11/8/2017   • MGD (meibomian gland dysfunction) 2011    OU; per NG   • Obesity, unspecified     per NG   • Other and unspecified hy curb one's appetite, control weight and lead to better blood pressure control. 2. Mixed hyperlipidemia    Patient instructed to take cholesterol lowering medications as prescribed and to continue to follow a low fat, low cholesterol diet as discussed.  Hal Mejia

## 2021-03-31 PROCEDURE — 99490 CHRNC CARE MGMT STAFF 1ST 20: CPT

## 2021-03-31 NOTE — H&P
[address] [facility] [address] Gastroenterology                                                                                                      Clinic H&P    Patient presents Tonsillectomy; per NG   • Internal hemorrhoids 11/8/2017   • MGD (meibomian gland dysfunction) 2011    OU; per NG   • Obesity, unspecified     per NG   • Other and unspecified hyperlipidemia     per NG   • PVD [facility]peripheral vascular disease) ([facility]     per NG tablet (10 mg total) by mouth daily. 90 tablet 3   • BABY ASPIRIN OR Take 81 mg by mouth one time. • Ascorbic Acid (VITAMIN C) 1000 MG Oral Tab Take 1,000 mg by mouth daily.          Allergies:  No Known Allergies    ROS:   CONSTITUTIONAL:  negative for issues, who presents for colonoscopy recall evaluation      1. Screening for colon cancer/history of colon polyps/hemorrhoids: The patient is currently asymptomatic from an upper and lower GI perspective.   He has a history of hemorrhoids however these do intervention [i.e. polypectomy, stent placement, etc.] as indicated. Orders This Visit:  No orders of the defined types were placed in this encounter.       Meds This Visit:  Requested Prescriptions     Signed Prescriptions Disp Refills   • PEG 3350-MANUEL

## 2021-04-14 ENCOUNTER — OFFICE VISIT (OUTPATIENT)
Dept: GASTROENTEROLOGY | Facility: CLINIC | Age: 71
End: 2021-04-14
Payer: MEDICARE

## 2021-04-14 ENCOUNTER — TELEPHONE (OUTPATIENT)
Dept: GASTROENTEROLOGY | Facility: CLINIC | Age: 71
End: 2021-04-14

## 2021-04-14 VITALS
DIASTOLIC BLOOD PRESSURE: 70 MMHG | WEIGHT: 206 LBS | HEIGHT: 69 IN | BODY MASS INDEX: 30.51 KG/M2 | SYSTOLIC BLOOD PRESSURE: 104 MMHG | HEART RATE: 85 BPM

## 2021-04-14 DIAGNOSIS — K64.9 HEMORRHOIDS, UNSPECIFIED HEMORRHOID TYPE: ICD-10-CM

## 2021-04-14 DIAGNOSIS — Z86.010 HISTORY OF COLON POLYPS: Primary | ICD-10-CM

## 2021-04-14 PROCEDURE — 99203 OFFICE O/P NEW LOW 30 MIN: CPT | Performed by: NURSE PRACTITIONER

## 2021-04-14 RX ORDER — POLYETHYLENE GLYCOL 3350, SODIUM CHLORIDE, SODIUM BICARBONATE, POTASSIUM CHLORIDE 420; 11.2; 5.72; 1.48 G/4L; G/4L; G/4L; G/4L
POWDER, FOR SOLUTION ORAL
Qty: 1 BOTTLE | Refills: 0 | Status: SHIPPED | OUTPATIENT
Start: 2021-04-14 | End: 2021-06-14

## 2021-04-14 NOTE — PATIENT INSTRUCTIONS
-Schedule colonoscopy w/ Dr. Sarah Sanders with MAC or Dr. Pinon  with IV twilight or MAC  Dx: hx colon polyps  -Eligible for NE: No r/t chronic pulmonary history  -Prep: Split dose Colyte/TriLyte or equivalent  -Anti-platelets and anti-coagulants: ASA-gwendolyn

## 2021-04-14 NOTE — TELEPHONE ENCOUNTER
Scheduled for:  Colonoscopy [address]395  Provider Name:  Dr Aly Bryan  Date: 07/27/2021    Location:  [facility]  Sedation:  [facility]  Time:  1:00pm (pt is aware to arrive at 12:00pm)  Prep: Suprep    Meds/Allergies Reconciled?: Jolanta/APN reviewed.   Diagnosis with codes

## 2021-04-27 ENCOUNTER — PATIENT OUTREACH (OUTPATIENT)
Dept: CASE MANAGEMENT | Age: 71
End: 2021-04-27

## 2021-04-27 DIAGNOSIS — E66.09 CLASS 1 OBESITY DUE TO EXCESS CALORIES WITHOUT SERIOUS COMORBIDITY WITH BODY MASS INDEX (BMI) OF 31.0 TO 31.9 IN ADULT: ICD-10-CM

## 2021-04-27 DIAGNOSIS — I10 ESSENTIAL HYPERTENSION WITH GOAL BLOOD PRESSURE LESS THAN 140/90: ICD-10-CM

## 2021-04-27 DIAGNOSIS — E78.2 MIXED HYPERLIPIDEMIA: ICD-10-CM

## 2021-04-27 NOTE — PROGRESS NOTES
4/27/2021  Spoke to Giorgi moran for CCM.       Updates to patient care team/ comments: UTD  Patient reported changes in medications: None  Med Adherence  Comment: Taking as directed    Health Maintenance:Colonoscopy is scheduled for June 2021  FIT/FOBT Colorectal S exercise and stretching.      Future Appointments   Date Time Provider Leif Morris   6/14/2021  2:40 PM Dandre Corona MD [facility]   7/27/2021  1:00 PM STATAZAM RODRIGUEZ [facility] None      Time Spent This Encounter Total: 25

## 2021-04-30 PROCEDURE — 99490 CHRNC CARE MGMT STAFF 1ST 20: CPT

## 2021-05-07 ENCOUNTER — PATIENT OUTREACH (OUTPATIENT)
Dept: CASE MANAGEMENT | Age: 71
End: 2021-05-07

## 2021-05-07 DIAGNOSIS — I10 ESSENTIAL HYPERTENSION WITH GOAL BLOOD PRESSURE LESS THAN 140/90: ICD-10-CM

## 2021-05-07 DIAGNOSIS — F32.0 MAJOR DEPRESSIVE DISORDER, SINGLE EPISODE, MILD (HCC): ICD-10-CM

## 2021-05-07 DIAGNOSIS — E78.2 MIXED HYPERLIPIDEMIA: ICD-10-CM

## 2021-05-07 DIAGNOSIS — E66.09 CLASS 1 OBESITY DUE TO EXCESS CALORIES WITHOUT SERIOUS COMORBIDITY WITH BODY MASS INDEX (BMI) OF 31.0 TO 31.9 IN ADULT: ICD-10-CM

## 2021-05-07 NOTE — PROGRESS NOTES
5/7/2021  Spoke to Giorgi moran for CCM. Updates to patient care team/ comments: UTD  Patient reported changes in medications: None  Med Adherence  Comment: Taking as directed    Health Maintenance:  Reviewed with patient.    FIT/FOBT Colorectal Screening Never - confidence: 4    Care Manager Follow Up: One month    Reason For Follow Up: review progress and or barriers towards patients goals. Care managers interventions: Praised for staying active.  Encouraged three balanced meals along with 20-30 minutes of

## 2021-05-31 PROCEDURE — 99490 CHRNC CARE MGMT STAFF 1ST 20: CPT

## 2021-06-14 ENCOUNTER — OFFICE VISIT (OUTPATIENT)
Dept: INTERNAL MEDICINE CLINIC | Facility: CLINIC | Age: 71
End: 2021-06-14
Payer: MEDICARE

## 2021-06-14 VITALS
SYSTOLIC BLOOD PRESSURE: 108 MMHG | HEIGHT: 69 IN | WEIGHT: 208 LBS | BODY MASS INDEX: 30.81 KG/M2 | DIASTOLIC BLOOD PRESSURE: 71 MMHG | HEART RATE: 92 BPM | RESPIRATION RATE: 16 BRPM

## 2021-06-14 DIAGNOSIS — M71.38 SYNOVIAL CYST OF LUMBAR SPINE: ICD-10-CM

## 2021-06-14 DIAGNOSIS — I10 ESSENTIAL HYPERTENSION WITH GOAL BLOOD PRESSURE LESS THAN 140/90: ICD-10-CM

## 2021-06-14 DIAGNOSIS — J43.1 PANLOBULAR EMPHYSEMA (HCC): ICD-10-CM

## 2021-06-14 DIAGNOSIS — E78.2 MIXED HYPERLIPIDEMIA: ICD-10-CM

## 2021-06-14 DIAGNOSIS — I70.0 ATHEROSCLEROSIS OF AORTA (HCC): ICD-10-CM

## 2021-06-14 DIAGNOSIS — Z00.00 PHYSICAL EXAM, ANNUAL: Primary | ICD-10-CM

## 2021-06-14 DIAGNOSIS — E66.09 CLASS 1 OBESITY DUE TO EXCESS CALORIES WITHOUT SERIOUS COMORBIDITY WITH BODY MASS INDEX (BMI) OF 31.0 TO 31.9 IN ADULT: ICD-10-CM

## 2021-06-14 PROBLEM — I73.9 PERIPHERAL VASCULAR DISEASE (HCC): Status: RESOLVED | Noted: 2021-03-17 | Resolved: 2021-06-14

## 2021-06-14 PROBLEM — G63 POLYNEUROPATHY IN OTHER DISEASES CLASSIFIED ELSEWHERE (HCC): Status: RESOLVED | Noted: 2021-03-17 | Resolved: 2021-06-14

## 2021-06-14 PROBLEM — I73.9 PERIPHERAL VASCULAR DISEASE: Status: RESOLVED | Noted: 2021-03-17 | Resolved: 2021-06-14

## 2021-06-14 PROCEDURE — G0439 PPPS, SUBSEQ VISIT: HCPCS | Performed by: INTERNAL MEDICINE

## 2021-06-14 NOTE — PROGRESS NOTES
REASON FOR VISIT:    Jc Lara is a 65year old male who presents for a Medicare Annual Wellness visit.        Patient Care Team: Patient Care Team:  Ulises Richey MD as PCP - General (Internal Medicine)  David Bass MD (SURGERY, ORTHOPEDIC 12/10/2012   Total Cholesterol <200 mg/dL 86 87(L) 78(L) 90(L) 86(L) 94(L) 97(L)   Triglycerides 30 - 149 mg/dL 102 71 77 74 81 85 115   HDL 40 - 59 mg/dL 36(L) 43 35 37 38(L) 35(L) 37(L)   LDL <100 mg/dL 30 30 28 38 32 42 37     BUN and Cr Latest Ref Rng as prescribed: Able without help  Are you able to afford your medications?: Yes  Hearing Problems?: No     Functional Status     Hearing Problems?: No  Vision Problems? : No  Difficulty walking?: No  Difficulty dressing or bathing?: No  Problems with daily 06/08/2020 0.93       Digoxin Serum Conc  Annually No results found for: DIGOXIN     COPD     Spirometry Testing Annually No results found for this or any previous visit.           ALLERGIES:   No Known Allergies  MEDICAL INFORMATION:   Past Medical Histo 11/09/2020      Pneumovax 23 06/23/2020        SOCIAL HISTORY:   Social History    Tobacco Use      Smoking status: Former Smoker        Packs/day: 1.00        Years: 35.00        Pack years: 35        Types: Cigarettes      Smokeless tobacco: For intact      ASSESSMENT AND OTHER RELEVANT CHRONIC CONDITIONS:   Denton Moya is a 65year old male who presents for a Medicare Assessment. PLAN SUMMARY:   Diagnoses and all orders for this visit:    1.  Physical exam, annual    Healthy physical examinatio can be started with walking regularly and gradually increase the distance or time walked. A healthy BMI is considered 25 or less. 6. 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  Take ib

## 2021-06-24 ENCOUNTER — PATIENT OUTREACH (OUTPATIENT)
Dept: CASE MANAGEMENT | Age: 71
End: 2021-06-24

## 2021-06-24 DIAGNOSIS — E78.2 MIXED HYPERLIPIDEMIA: ICD-10-CM

## 2021-06-24 DIAGNOSIS — E66.09 CLASS 1 OBESITY DUE TO EXCESS CALORIES WITHOUT SERIOUS COMORBIDITY WITH BODY MASS INDEX (BMI) OF 31.0 TO 31.9 IN ADULT: ICD-10-CM

## 2021-06-24 DIAGNOSIS — I10 ESSENTIAL HYPERTENSION WITH GOAL BLOOD PRESSURE LESS THAN 140/90: ICD-10-CM

## 2021-06-24 NOTE — PROGRESS NOTES
Contacting patient to follow up on CCM for the month.  LMCB    Total time - 2  min  Total Monthly time-2  min

## 2021-06-25 NOTE — PROGRESS NOTES
6/25/2021  Spoke to Giorgi moran for CCM. Updates to patient care team/ comments: UTD  Patient reported changes in medications: None  Med Adherence  Comment: Taking as directed    Health Maintenance:  UTD, reviewed with patient.    FIT/FOBT Colorectal Screening staying active, encouraged eating more balanced meals, cutting back on processed foods.      Future Appointments   Date Time Provider Leif Alexandra   7/27/2021  1:00 PM BRANDIE, PROCEDURE [facility] None   10/4/2021  3:30 PM Mansi Bella MD [facility]

## 2021-06-30 PROCEDURE — 99490 CHRNC CARE MGMT STAFF 1ST 20: CPT

## 2021-07-20 ENCOUNTER — TELEPHONE (OUTPATIENT)
Dept: GASTROENTEROLOGY | Facility: CLINIC | Age: 71
End: 2021-07-20

## 2021-07-20 ENCOUNTER — PATIENT OUTREACH (OUTPATIENT)
Dept: CASE MANAGEMENT | Age: 71
End: 2021-07-20

## 2021-07-20 DIAGNOSIS — E66.3 OVERWEIGHT (BMI 25.0-29.9): ICD-10-CM

## 2021-07-20 DIAGNOSIS — E78.2 MIXED HYPERLIPIDEMIA: ICD-10-CM

## 2021-07-20 DIAGNOSIS — I10 ESSENTIAL HYPERTENSION WITH GOAL BLOOD PRESSURE LESS THAN 140/90: ICD-10-CM

## 2021-07-20 NOTE — TELEPHONE ENCOUNTER
Pt is scheduled to have Colonoscopy done 7/27 states he needs to have a COVID test three days prior to test. Pt is requesting order. He also have a few questions regarding prep and meals prior. Pt received instructions, woulds like to clarify.  Please advis

## 2021-07-20 NOTE — PROGRESS NOTES
7/20/2021  Spoke to Giorgi moran for CCM.       Updates to patient care team/ comments: UTD  Patient reported changes in medications: None  Med Adherence  Comment: Taking as directed    Health Maintenance: Colonoscopy is scheduled for 7/27  FIT/FOBT Colorectal Scree instructions.   Referral sent?:  NA  [facility] or [facility] notified?:  I sent an electronic request to Endo Scheduling and received a confirmation today.   Medication Orders:  Hold lisinopril morning of procedure.  Pt is aware to NOT take iron pills, herbal meds and Time Spent This Encounter Total: 34 min medical record review, telephone communication, care plan updates where needed, education, goals and action plan recreation/update. Provided acknowledgment and validation to patient's concerns.    Monthly Minute Tot

## 2021-07-21 NOTE — TELEPHONE ENCOUNTER
Patient contacted and advised that someone from the hospital will call him 2 to 3 days prior to his procedure regarding the Covid test.  Patient voiced understanding. No further questions at this time.

## 2021-07-23 NOTE — PAT NURSING NOTE
Per IC, pt does not require a covid test prior to procedure due to asymptomatic, not immunocompromised, fully vaccinated and 2 weeks post last dose of vaccine.

## 2021-07-27 ENCOUNTER — HOSPITAL ENCOUNTER (OUTPATIENT)
Facility: HOSPITAL | Age: 71
Setting detail: HOSPITAL OUTPATIENT SURGERY
Discharge: HOME | End: 2021-07-27
Attending: INTERNAL MEDICINE | Admitting: INTERNAL MEDICINE
Payer: MEDICARE

## 2021-07-27 ENCOUNTER — ANESTHESIA EVENT (OUTPATIENT)
Dept: ENDOSCOPY | Facility: HOSPITAL | Age: 71
End: 2021-07-27
Payer: MEDICARE

## 2021-07-27 ENCOUNTER — ANESTHESIA (OUTPATIENT)
Dept: ENDOSCOPY | Facility: HOSPITAL | Age: 71
End: 2021-07-27
Payer: MEDICARE

## 2021-07-27 VITALS
OXYGEN SATURATION: 95 % | RESPIRATION RATE: 18 BRPM | SYSTOLIC BLOOD PRESSURE: 135 MMHG | WEIGHT: 205 LBS | BODY MASS INDEX: 31.07 KG/M2 | HEART RATE: 92 BPM | HEIGHT: 68 IN | DIASTOLIC BLOOD PRESSURE: 92 MMHG

## 2021-07-27 DIAGNOSIS — Z86.010 HISTORY OF COLON POLYPS: ICD-10-CM

## 2021-07-27 PROCEDURE — 0DBL8ZX EXCISION OF TRANSVERSE COLON, VIA NATURAL OR ARTIFICIAL OPENING ENDOSCOPIC, DIAGNOSTIC: ICD-10-PCS | Performed by: INTERNAL MEDICINE

## 2021-07-27 PROCEDURE — 0DBH8ZX EXCISION OF CECUM, VIA NATURAL OR ARTIFICIAL OPENING ENDOSCOPIC, DIAGNOSTIC: ICD-10-PCS | Performed by: INTERNAL MEDICINE

## 2021-07-27 PROCEDURE — 0DBN8ZX EXCISION OF SIGMOID COLON, VIA NATURAL OR ARTIFICIAL OPENING ENDOSCOPIC, DIAGNOSTIC: ICD-10-PCS | Performed by: INTERNAL MEDICINE

## 2021-07-27 PROCEDURE — 45385 COLONOSCOPY W/LESION REMOVAL: CPT | Performed by: INTERNAL MEDICINE

## 2021-07-27 RX ORDER — SODIUM CHLORIDE, SODIUM LACTATE, POTASSIUM CHLORIDE, CALCIUM CHLORIDE 600; 310; 30; 20 MG/100ML; MG/100ML; MG/100ML; MG/100ML
INJECTION, SOLUTION INTRAVENOUS CONTINUOUS
Status: DISCONTINUED | OUTPATIENT
Start: 2021-07-27 | End: 2021-07-27

## 2021-07-27 RX ORDER — LIDOCAINE HYDROCHLORIDE 10 MG/ML
INJECTION, SOLUTION EPIDURAL; INFILTRATION; INTRACAUDAL; PERINEURAL AS NEEDED
Status: DISCONTINUED | OUTPATIENT
Start: 2021-07-27 | End: 2021-07-27 | Stop reason: SURG

## 2021-07-27 RX ADMIN — SODIUM CHLORIDE, SODIUM LACTATE, POTASSIUM CHLORIDE, CALCIUM CHLORIDE: 600; 310; 30; 20 INJECTION, SOLUTION INTRAVENOUS at 13:07:00

## 2021-07-27 RX ADMIN — LIDOCAINE HYDROCHLORIDE 50 MG: 10 INJECTION, SOLUTION EPIDURAL; INFILTRATION; INTRACAUDAL; PERINEURAL at 13:09:00

## 2021-07-27 RX ADMIN — SODIUM CHLORIDE, SODIUM LACTATE, POTASSIUM CHLORIDE, CALCIUM CHLORIDE: 600; 310; 30; 20 INJECTION, SOLUTION INTRAVENOUS at 13:55:00

## 2021-07-27 NOTE — OPERATIVE REPORT
[facility] Endoscopy Report      Date of Procedure:  07/27/21      Preoperative Diagnosis:  Personal history of adenomatous colon polyps      Postoperative Diagnosis:  1. Colon polyps  2.   Nearly pancolonic diverticulosis      Procedure: from at least the proximal transverse colon to the sigmoid most numerous in the latter without current signs of complication. There were no other colonic polyps, mass lesions, vascular anomalies or signs of inflammation seen.   Retroflexion in the rectum r

## 2021-07-27 NOTE — ANESTHESIA POSTPROCEDURE EVALUATION
Patient: Berto Escalante    Procedure Summary     Date: 07/27/21 Room / Location: [address] [place] [address] ENDOSCOPY 04 / [address] [place] [address] ENDOSCOPY    Anesthesia Start: 2219 Anesthesia Stop:     Procedure: COLONOSCOPY (N/A ) Diagnosis:       History of colon polyps      (polyps, diverticulosis

## 2021-07-27 NOTE — ANESTHESIA PREPROCEDURE EVALUATION
Anesthesia PreOp Note    HPI:     Radha Soto is a 65year old male who presents for preoperative consultation requested by: Ranjana Kyle MD    Date of Surgery: 7/27/2021    Procedure(s):  COLONOSCOPY  Indication: History of colon polyps    Relevan Tonsillectomy; per NG   • Internal hemorrhoids 11/8/2017   • MGD (meibomian gland dysfunction) 2011    OU; per NG   • Obesity, unspecified     per NG   • Other and unspecified hyperlipidemia     per NG   • Pulmonary emphysema ([facility] [address]    • PVD (peripheral vas of education: Not on file      Highest education level: Not on file    Occupational History      Not on file    Tobacco Use      Smoking status: Former Smoker        Packs/day: 1.00        Years: 35.00        Pack years: 35        Types: Cigarettes      Sm labs reviewed. Vital Signs: Body mass index is 31.17 kg/m². height is 1.727 m (5' 8\") and weight is 93 kg (205 lb). His blood pressure is 142/77 and his pulse is 91. His respiration is 16 and oxygen saturation is 95%.     07/27/21  1235   BP

## 2021-07-27 NOTE — H&P
History & Physical Examination    Patient Name: Radha Soto  MRN: [id number]  [facility]: [id number]  YOB: 1950    Diagnosis: Personal history of adenomatous colon polyps      SIMVASTATIN 20 MG Oral Tab, TAKE 1 TABLET BY MOUTH EVERY EVENING, Disp: 9 Mother 80        Alzheimer's disease, Cause of death; per NG   • Diabetes Neg    • Glaucoma Neg    • Macular degeneration Neg    • Retinal detachment Neg    • Lipids Neg      Social History    Tobacco Use      Smoking status: Former Smoker        Packs/day

## 2021-07-30 ENCOUNTER — TELEPHONE (OUTPATIENT)
Dept: GASTROENTEROLOGY | Facility: CLINIC | Age: 71
End: 2021-07-30

## 2021-07-30 NOTE — TELEPHONE ENCOUNTER
Health Maintenance Updated. 3 year colonoscopy recall entered into patient outreach in [address] [place] [address] Rd. Patient will be due for next colonoscopy 7/27/2024.

## 2021-07-30 NOTE — TELEPHONE ENCOUNTER
----- Message from Eusebio Carrington MD sent at 7/29/2021  7:27 PM CDT -----  As per JOSE parameters I left a message on the patient's personal home voicemail. He had #8 subcentimeter polyps removed. #6 were adenomatous and #2 hyperplastic.   I have dis

## 2021-07-31 PROCEDURE — 99490 CHRNC CARE MGMT STAFF 1ST 20: CPT

## 2021-08-27 ENCOUNTER — PATIENT OUTREACH (OUTPATIENT)
Dept: CASE MANAGEMENT | Age: 71
End: 2021-08-27

## 2021-08-27 DIAGNOSIS — E78.2 MIXED HYPERLIPIDEMIA: ICD-10-CM

## 2021-08-27 DIAGNOSIS — E66.09 CLASS 1 OBESITY DUE TO EXCESS CALORIES WITHOUT SERIOUS COMORBIDITY WITH BODY MASS INDEX (BMI) OF 31.0 TO 31.9 IN ADULT: ICD-10-CM

## 2021-08-27 DIAGNOSIS — I10 ESSENTIAL HYPERTENSION WITH GOAL BLOOD PRESSURE LESS THAN 140/90: ICD-10-CM

## 2021-08-27 NOTE — PROGRESS NOTES
8/27/2021  Spoke to Giorgi moran for CCM. Updates to patient care team/ comments: UTD  Patient reported changes in medications: None  Med Adherence  Comment: Taking as directed    Health Maintenance:  UTD, reviewed with patient.    Zoster Vaccines(1 of 2) Never Abilities (patient reported)             1= least confident in achieving goal, 5= very confident               - confidence: 4    Care Manager Follow Up: One month    Reason For Follow Up: review progress and or barriers towards patients goals.      Care ma

## 2021-08-31 PROCEDURE — 99490 CHRNC CARE MGMT STAFF 1ST 20: CPT

## 2021-09-29 ENCOUNTER — PATIENT OUTREACH (OUTPATIENT)
Dept: CASE MANAGEMENT | Age: 71
End: 2021-09-29

## 2021-09-29 DIAGNOSIS — E66.09 CLASS 1 OBESITY DUE TO EXCESS CALORIES WITHOUT SERIOUS COMORBIDITY WITH BODY MASS INDEX (BMI) OF 31.0 TO 31.9 IN ADULT: ICD-10-CM

## 2021-09-29 DIAGNOSIS — I10 ESSENTIAL HYPERTENSION WITH GOAL BLOOD PRESSURE LESS THAN 140/90: ICD-10-CM

## 2021-09-29 NOTE — PROGRESS NOTES
9/29/2021  Spoke to Giorgi moran for CCM. Updates to patient care team/ comments: UTD  Patient reported changes in medications: None  Med Adherence  Comment: Taking as directed    Health Maintenance:  UTD, reviewed with patient.    Zoster Vaccines(1 of 2) Never yes  Self-Management Abilities (patient reported)             1= least confident in achieving goal, 5= very confident               - confidence: 4    Care Manager Follow Up:  One month    Reason For Follow Up: review progress and or barriers towards patien

## 2021-09-30 PROCEDURE — 99490 CHRNC CARE MGMT STAFF 1ST 20: CPT

## 2021-10-04 ENCOUNTER — OFFICE VISIT (OUTPATIENT)
Dept: INTERNAL MEDICINE CLINIC | Facility: CLINIC | Age: 71
End: 2021-10-04
Payer: MEDICARE

## 2021-10-04 VITALS
WEIGHT: 207 LBS | OXYGEN SATURATION: 98 % | SYSTOLIC BLOOD PRESSURE: 107 MMHG | DIASTOLIC BLOOD PRESSURE: 69 MMHG | HEIGHT: 68 IN | RESPIRATION RATE: 16 BRPM | HEART RATE: 96 BPM | BODY MASS INDEX: 31.37 KG/M2

## 2021-10-04 DIAGNOSIS — Z23 NEED FOR PNEUMOCOCCAL VACCINATION: ICD-10-CM

## 2021-10-04 DIAGNOSIS — Z12.5 SCREENING PSA (PROSTATE SPECIFIC ANTIGEN): ICD-10-CM

## 2021-10-04 DIAGNOSIS — M54.16 LUMBAR RADICULOPATHY: ICD-10-CM

## 2021-10-04 DIAGNOSIS — E78.2 MIXED HYPERLIPIDEMIA: ICD-10-CM

## 2021-10-04 DIAGNOSIS — I10 ESSENTIAL HYPERTENSION WITH GOAL BLOOD PRESSURE LESS THAN 140/90: Primary | ICD-10-CM

## 2021-10-04 PROCEDURE — 99214 OFFICE O/P EST MOD 30 MIN: CPT | Performed by: INTERNAL MEDICINE

## 2021-10-04 PROCEDURE — G0009 ADMIN PNEUMOCOCCAL VACCINE: HCPCS | Performed by: INTERNAL MEDICINE

## 2021-10-04 PROCEDURE — 90670 PCV13 VACCINE IM: CPT | Performed by: INTERNAL MEDICINE

## 2021-10-04 RX ORDER — GABAPENTIN 100 MG/1
100 CAPSULE ORAL 3 TIMES DAILY
Qty: 270 CAPSULE | Refills: 0 | Status: SHIPPED | OUTPATIENT
Start: 2021-10-04 | End: 2022-01-02

## 2021-10-04 NOTE — PROGRESS NOTES
Johana Cam is a 65year old male. Patient presents with:  Establish Care: NP here to establish care     HPI:   75-year-old gentleman with a past medical history of hypertension, dyslipidemia, COPD, DJD/spinal stenosis here to establish care.   He was previou IOL; per NG   • COLONOSCOPY  11/1/2012    repeat in 2017 fall   • COLONOSCOPY N/A 11/8/2017    Procedure: COLONOSCOPY;  Surgeon: Valentín Dukes MD;  Location: [facility]   • COLONOSCOPY N/A 7/27/2021    Procedure: COLONOSCOPY;  Surgeon: Jv Mckeon Location: Right arm, Patient Position: Sitting, Cuff Size: adult)   Pulse 96   Resp 16   Ht 5' 8\" (1.727 m)   Wt 207 lb (93.9 kg)   SpO2 98%   BMI 31.47 kg/m²      Physical Exam  Constitutional:       Appearance: Normal appearance.    HENT:      Head: Norm spine.  He has completed physical therapy with no much success. I will start him on gabapentin 100 mg once a day then titrate slowly up to 3 times a day. I have given him to 270 tablets of gabapentin. If there is no improvement, he can see physiatry.   I

## 2021-11-05 ENCOUNTER — PATIENT OUTREACH (OUTPATIENT)
Dept: CASE MANAGEMENT | Age: 71
End: 2021-11-05

## 2021-11-05 DIAGNOSIS — E78.2 MIXED HYPERLIPIDEMIA: ICD-10-CM

## 2021-11-05 DIAGNOSIS — M54.16 LUMBAR RADICULOPATHY: ICD-10-CM

## 2021-11-05 DIAGNOSIS — I10 ESSENTIAL HYPERTENSION WITH GOAL BLOOD PRESSURE LESS THAN 140/90: ICD-10-CM

## 2021-11-05 DIAGNOSIS — E66.09 CLASS 1 OBESITY DUE TO EXCESS CALORIES WITHOUT SERIOUS COMORBIDITY WITH BODY MASS INDEX (BMI) OF 31.0 TO 31.9 IN ADULT: ICD-10-CM

## 2021-11-09 ENCOUNTER — OFFICE VISIT (OUTPATIENT)
Dept: PHYSICAL MEDICINE AND REHAB | Facility: CLINIC | Age: 71
End: 2021-11-09
Payer: MEDICARE

## 2021-11-09 ENCOUNTER — TELEPHONE (OUTPATIENT)
Dept: PHYSICAL MEDICINE AND REHAB | Facility: CLINIC | Age: 71
End: 2021-11-09

## 2021-11-09 VITALS
OXYGEN SATURATION: 93 % | DIASTOLIC BLOOD PRESSURE: 52 MMHG | HEART RATE: 95 BPM | HEIGHT: 68 IN | SYSTOLIC BLOOD PRESSURE: 120 MMHG | WEIGHT: 207 LBS | BODY MASS INDEX: 31.37 KG/M2

## 2021-11-09 DIAGNOSIS — M54.16 LUMBAR RADICULOPATHY: Primary | ICD-10-CM

## 2021-11-09 PROCEDURE — 99214 OFFICE O/P EST MOD 30 MIN: CPT | Performed by: PHYSICAL MEDICINE & REHABILITATION

## 2021-11-09 RX ORDER — METHYLPREDNISOLONE 4 MG/1
TABLET ORAL
Qty: 1 EACH | Refills: 0 | Status: SHIPPED | OUTPATIENT
Start: 2021-11-09 | End: 2022-01-07

## 2021-11-09 NOTE — TELEPHONE ENCOUNTER
Per Medicare Guidelines -no authorization is required for imaging     Status: Approved-Covered Benefit     Patient notified via [employer], he can proceed with scheduling L-Spine MRI CPT 80603

## 2021-11-09 NOTE — PROGRESS NOTES
Progress note    C/C: left lower back and leg pain    HPI: 66-year-old male presents for follow-up. I last saw him nearly 3 years ago for similar problems.   At that time I was treating him for lumbar stenosis, exacerbated by a synovial cyst.  He has been

## 2021-11-09 NOTE — PATIENT INSTRUCTIONS
Take the medication as directed starting tomorrow morning. Have the MRI done and I will review it and give you further instructions.

## 2021-11-15 ENCOUNTER — LAB ENCOUNTER (OUTPATIENT)
Dept: LAB | Age: 71
End: 2021-11-15
Attending: INTERNAL MEDICINE
Payer: MEDICARE

## 2021-11-15 DIAGNOSIS — R73.9 HYPERGLYCEMIA: ICD-10-CM

## 2021-11-15 DIAGNOSIS — D72.829 LEUKOCYTOSIS, UNSPECIFIED TYPE: Primary | ICD-10-CM

## 2021-11-15 DIAGNOSIS — I10 ESSENTIAL HYPERTENSION WITH GOAL BLOOD PRESSURE LESS THAN 140/90: ICD-10-CM

## 2021-11-15 DIAGNOSIS — Z12.5 SCREENING PSA (PROSTATE SPECIFIC ANTIGEN): ICD-10-CM

## 2021-11-15 DIAGNOSIS — E78.2 MIXED HYPERLIPIDEMIA: ICD-10-CM

## 2021-11-15 PROCEDURE — 80053 COMPREHEN METABOLIC PANEL: CPT

## 2021-11-15 PROCEDURE — 80061 LIPID PANEL: CPT

## 2021-11-15 PROCEDURE — 84443 ASSAY THYROID STIM HORMONE: CPT

## 2021-11-15 PROCEDURE — 36415 COLL VENOUS BLD VENIPUNCTURE: CPT

## 2021-11-15 PROCEDURE — 85027 COMPLETE CBC AUTOMATED: CPT

## 2021-11-30 PROCEDURE — 99490 CHRNC CARE MGMT STAFF 1ST 20: CPT

## 2021-12-06 ENCOUNTER — HOSPITAL ENCOUNTER (OUTPATIENT)
Dept: MRI IMAGING | Facility: HOSPITAL | Age: 71
Discharge: HOME | End: 2021-12-06
Attending: PHYSICAL MEDICINE & REHABILITATION
Payer: MEDICARE

## 2021-12-06 DIAGNOSIS — M54.16 LUMBAR RADICULOPATHY: ICD-10-CM

## 2021-12-06 PROCEDURE — 72148 MRI LUMBAR SPINE W/O DYE: CPT | Performed by: PHYSICAL MEDICINE & REHABILITATION

## 2021-12-10 ENCOUNTER — TELEPHONE (OUTPATIENT)
Dept: PHYSICAL MEDICINE AND REHAB | Facility: CLINIC | Age: 71
End: 2021-12-10

## 2021-12-10 NOTE — TELEPHONE ENCOUNTER
----- Message from Faraz Thomas sent at 12/9/2021  3:09 PM CST -----    ----- Message -----  From: Flip Herman DO  Sent: 12/9/2021   3:04 PM CST  To: Natalia Palm Nurse    Please let the patient know he has stenosis/narrowing at one of the middle le

## 2021-12-13 NOTE — TELEPHONE ENCOUNTER
[facility] is 1/6/22. Appointment was made on 12/10/21. Left message on patient`s voice mail to call office for MRI results. Advised Dr Zina Chris will go over results at [facility] already scheduled for 1/6/22.

## 2021-12-17 ENCOUNTER — PATIENT OUTREACH (OUTPATIENT)
Dept: CASE MANAGEMENT | Age: 71
End: 2021-12-17

## 2021-12-17 DIAGNOSIS — E78.2 MIXED HYPERLIPIDEMIA: ICD-10-CM

## 2021-12-17 DIAGNOSIS — E66.09 CLASS 1 OBESITY DUE TO EXCESS CALORIES WITHOUT SERIOUS COMORBIDITY WITH BODY MASS INDEX (BMI) OF 31.0 TO 31.9 IN ADULT: ICD-10-CM

## 2021-12-17 DIAGNOSIS — I10 ESSENTIAL HYPERTENSION WITH GOAL BLOOD PRESSURE LESS THAN 140/90: ICD-10-CM

## 2021-12-17 NOTE — PROGRESS NOTES
12/17/2021  Spoke to Shaun Arias for CCM. Updates to patient care team/ comments: UTD  Patient reported changes in medications: None  Med Adherence  Comment: Taking as directed    Health Maintenance:  UTD, reviewed with patient.    Zoster Vaccines(1 of 2) due goals. Care managers interventions:     Encouraged patient he keep appointment with Dr. Bj Agee. Reviewed healthy eating habits, regular exercise and preventative guidelines. Reinforced healthy diet, lifestyle, and exercise.     Future Appointment

## 2021-12-27 ENCOUNTER — LAB ENCOUNTER (OUTPATIENT)
Dept: LAB | Age: 71
End: 2021-12-27
Attending: INTERNAL MEDICINE
Payer: MEDICARE

## 2021-12-27 DIAGNOSIS — R73.9 HYPERGLYCEMIA: ICD-10-CM

## 2021-12-27 DIAGNOSIS — D72.829 LEUKOCYTOSIS, UNSPECIFIED TYPE: ICD-10-CM

## 2021-12-27 PROCEDURE — 36415 COLL VENOUS BLD VENIPUNCTURE: CPT

## 2021-12-27 PROCEDURE — 85048 AUTOMATED LEUKOCYTE COUNT: CPT

## 2021-12-27 PROCEDURE — 83036 HEMOGLOBIN GLYCOSYLATED A1C: CPT

## 2021-12-31 PROCEDURE — 99490 CHRNC CARE MGMT STAFF 1ST 20: CPT

## 2022-01-06 ENCOUNTER — TELEPHONE (OUTPATIENT)
Dept: NEUROLOGY | Facility: CLINIC | Age: 72
End: 2022-01-06

## 2022-01-06 ENCOUNTER — VIRTUAL PHONE E/M (OUTPATIENT)
Dept: PHYSICAL MEDICINE AND REHAB | Facility: CLINIC | Age: 72
End: 2022-01-06
Payer: MEDICARE

## 2022-01-06 ENCOUNTER — TELEPHONE (OUTPATIENT)
Dept: PHYSICAL MEDICINE AND REHAB | Facility: CLINIC | Age: 72
End: 2022-01-06

## 2022-01-06 DIAGNOSIS — M48.062 LUMBAR STENOSIS WITH NEUROGENIC CLAUDICATION: ICD-10-CM

## 2022-01-06 DIAGNOSIS — M48.062 LUMBAR STENOSIS WITH NEUROGENIC CLAUDICATION: Primary | ICD-10-CM

## 2022-01-06 PROCEDURE — 99442 PHONE E/M BY PHYS 11-20 MIN: CPT | Performed by: PHYSICAL MEDICINE & REHABILITATION

## 2022-01-06 NOTE — TELEPHONE ENCOUNTER
Virtual/Telephone Check-In    Ke Almanza verbally consents to a Virtual/Telephone Check-In service on 1/6/2022. Patient understands and accepts financial responsibility for any deductible, co-insurance and/or co-pays associated with this service.     [place]

## 2022-01-06 NOTE — PROGRESS NOTES
See telephone enounter with today's date for virtual visit.     Milagros Acuna DO  Physical Medicine and [address]

## 2022-01-06 NOTE — TELEPHONE ENCOUNTER
L4-5 interlaminar epidural steroid injection under fluoroscopy, local. [facility]  CPT CODE: 42991-RP PRE-CERTIFICATION IS REQUIRED     Per Medicare Guidelines: Request is a covered benefit and pre-certification is not require.    All Medicare coverage is based o

## 2022-01-07 ENCOUNTER — OFFICE VISIT (OUTPATIENT)
Dept: INTERNAL MEDICINE CLINIC | Facility: CLINIC | Age: 72
End: 2022-01-07
Payer: MEDICARE

## 2022-01-07 VITALS
BODY MASS INDEX: 31.98 KG/M2 | OXYGEN SATURATION: 95 % | WEIGHT: 211 LBS | SYSTOLIC BLOOD PRESSURE: 116 MMHG | HEIGHT: 68 IN | HEART RATE: 102 BPM | DIASTOLIC BLOOD PRESSURE: 68 MMHG | RESPIRATION RATE: 14 BRPM

## 2022-01-07 DIAGNOSIS — M54.16 LUMBAR RADICULOPATHY: ICD-10-CM

## 2022-01-07 DIAGNOSIS — I10 ESSENTIAL HYPERTENSION WITH GOAL BLOOD PRESSURE LESS THAN 140/90: Primary | ICD-10-CM

## 2022-01-07 DIAGNOSIS — E78.2 MIXED HYPERLIPIDEMIA: ICD-10-CM

## 2022-01-07 PROCEDURE — 99214 OFFICE O/P EST MOD 30 MIN: CPT | Performed by: INTERNAL MEDICINE

## 2022-01-07 RX ORDER — SIMVASTATIN 20 MG
20 TABLET ORAL EVERY EVENING
Qty: 90 TABLET | Refills: 3 | Status: SHIPPED | OUTPATIENT
Start: 2022-01-07

## 2022-01-07 RX ORDER — LISINOPRIL 10 MG/1
10 TABLET ORAL DAILY
Qty: 90 TABLET | Refills: 3 | Status: SHIPPED | OUTPATIENT
Start: 2022-01-07

## 2022-01-07 NOTE — PROGRESS NOTES
Bethel Ogden is a 66year old male. Patient presents with: Follow - Up: 3 mos F/u   Hypertension    HPI:   66 old gentleman with medical history significant for hypertension, dyslipidemia here for follow-up. He continues to have lumbar spine radiculopathy. ENDOSCOPY   • ELECTROCARDIOGRAM, COMPLETE  10-    SCANNED TO MEDIA TAB: 10-   • TONSILLECTOMY      per NG      Social History:  Social History    Tobacco Use      Smoking status: Former Smoker        Packs/day: 1.00        Years: 35.00 normal.   Cardiovascular:      Rate and Rhythm: Normal rate and regular rhythm. Heart sounds: Normal heart sounds. No murmur heard. Pulmonary:      Effort: Pulmonary effort is normal.      Breath sounds: Normal breath sounds. No rhonchi or rales.    A

## 2022-01-12 NOTE — TELEPHONE ENCOUNTER
Patient has been scheduled for L4-5 interlaminar epidural steroid injection under fluoroscopy, local.  on 2/7/22 at the [facility] with Dr. Rosemarie Shirley.    -Anesthesia type: Local.  -If receiving MAC or IVC sedation patient will need to get COVID tested 3 days prior

## 2022-01-21 ENCOUNTER — PATIENT OUTREACH (OUTPATIENT)
Dept: CASE MANAGEMENT | Age: 72
End: 2022-01-21

## 2022-01-21 DIAGNOSIS — E78.2 MIXED HYPERLIPIDEMIA: ICD-10-CM

## 2022-01-21 DIAGNOSIS — I10 ESSENTIAL HYPERTENSION WITH GOAL BLOOD PRESSURE LESS THAN 140/90: ICD-10-CM

## 2022-01-21 DIAGNOSIS — M54.16 LUMBAR RADICULOPATHY: ICD-10-CM

## 2022-01-21 DIAGNOSIS — E66.09 CLASS 1 OBESITY DUE TO EXCESS CALORIES WITHOUT SERIOUS COMORBIDITY WITH BODY MASS INDEX (BMI) OF 31.0 TO 31.9 IN ADULT: ICD-10-CM

## 2022-01-21 NOTE — PROGRESS NOTES
1/21/2022  Spoke to Giorgi moran for CCM. Updates to patient care team/ comments: UTD  Patient reported changes in medications: None  Med Adherence  Comment: Taking as directed    Health Maintenance:  UTD, reviewed with patient.    Zoster Vaccines(1 of 2) due o goals. Care managers interventions: Encouraged daily stretching and staying hydrated. Reviewed healthy eating habits, regular exercise and preventative guidelines. Reinforced healthy diet, lifestyle, and exercise.     Future Appointments   Date Ti

## 2022-01-31 PROCEDURE — 99490 CHRNC CARE MGMT STAFF 1ST 20: CPT

## 2022-02-07 ENCOUNTER — OFFICE VISIT (OUTPATIENT)
Dept: SURGERY | Facility: CLINIC | Age: 72
End: 2022-02-07

## 2022-02-07 DIAGNOSIS — M54.16 LEFT LUMBAR RADICULOPATHY: ICD-10-CM

## 2022-02-07 DIAGNOSIS — M48.061 LUMBAR STENOSIS WITHOUT NEUROGENIC CLAUDICATION: ICD-10-CM

## 2022-02-07 PROCEDURE — 62323 NJX INTERLAMINAR LMBR/SAC: CPT | Performed by: PHYSICAL MEDICINE & REHABILITATION

## 2022-02-21 ENCOUNTER — PATIENT OUTREACH (OUTPATIENT)
Dept: CASE MANAGEMENT | Age: 72
End: 2022-02-21

## 2022-02-28 PROCEDURE — 99490 CHRNC CARE MGMT STAFF 1ST 20: CPT

## 2022-03-15 ENCOUNTER — OFFICE VISIT (OUTPATIENT)
Dept: PHYSICAL MEDICINE AND REHAB | Facility: CLINIC | Age: 72
End: 2022-03-15
Payer: MEDICARE

## 2022-03-15 ENCOUNTER — TELEPHONE (OUTPATIENT)
Dept: PHYSICAL MEDICINE AND REHAB | Facility: CLINIC | Age: 72
End: 2022-03-15

## 2022-03-15 VITALS
OXYGEN SATURATION: 94 % | HEIGHT: 68 IN | DIASTOLIC BLOOD PRESSURE: 64 MMHG | HEART RATE: 94 BPM | BODY MASS INDEX: 31.98 KG/M2 | WEIGHT: 211 LBS | SYSTOLIC BLOOD PRESSURE: 110 MMHG

## 2022-03-15 DIAGNOSIS — M48.062 LUMBAR STENOSIS WITH NEUROGENIC CLAUDICATION: Primary | ICD-10-CM

## 2022-03-15 PROCEDURE — 99214 OFFICE O/P EST MOD 30 MIN: CPT | Performed by: PHYSICAL MEDICINE & REHABILITATION

## 2022-03-15 RX ORDER — CELECOXIB 100 MG/1
100 CAPSULE ORAL 2 TIMES DAILY PRN
Qty: 30 CAPSULE | Refills: 0 | Status: SHIPPED | OUTPATIENT
Start: 2022-03-15

## 2022-03-15 NOTE — TELEPHONE ENCOUNTER
Per Medicare Guidelines -no authorization is required for aleida     Status: Approved-Covered Benefit    Clinical staff can proceed with scheduling Bilateral L5 transforaminal epidural steroid injections CPT [id number], pls schedule under IVCS as directed

## 2022-03-16 NOTE — TELEPHONE ENCOUNTER
LMTCB to schedule Bilateral L5 transforaminal epidural steroid injections IVCS with dr Rufino Nguyễn.

## 2022-03-16 NOTE — PROGRESS NOTES
Progress note    C/C: low back pain    HPI: 10-year-old male presents for follow-up. 20% relief following L5-S1 interlaminar epidural steroid injection under fluoroscopy, local, on 2/7/2022. He actually has had resolution of the radicular pain in the left leg, and is left with lower back pain at the level of the waistline that worsens particularly with standing, improves with sitting or with forward bending. He is able to walk for about 45 minutes, though he tends to find himself walking hunched forward. Pertinent allergies: No known drug allergies    Physical exam:  BMI 32.08. Blood pressure 110/64. Pulse 95. O2 sat 94%    Lumbar spine exam:    No pain with lumbar flexion. No pain with lumbar extension. He is not able to stand fully upright. No tenderness to palpation lumbar paraspinals. No ttp PSIS. 5/5 LE strength b/l in HF, KE, ADF, EHL, ankle eversion  SILT b/l LE  2/4 quad, gastrocs reflexes b/l  Facet loading negative bilaterally  Seated slump test negative  SLR negative bilaterally    Imaging: No new imaging to review    Assessment and plan  1. Lumbar stenosis  2. Left lumbar radiculopathy, resolved    Recommend bilateral L5 TFESI under fluoroscopy; patient prefers IVCS for situational anxiety this time. We discussed possible adverse events, including bleeding, infection, nerve injury, headaches, side effects from the sedation. Patient elects to proceed. Follow-up for procedure.     Jamshid Crenshaw DO  Physical Medicine and [address] [place] [address]

## 2022-03-17 NOTE — TELEPHONE ENCOUNTER
Patient has been scheduled for Bilateral L5 transforaminal epidural steroid injections  on 04/11/22 at the [address] with . -Anesthesia type: IVCS. -If receiving MAC or IVC sedation patient will need to get COVID tested 3 days prior even if already vaccinated (order placed by [address]  -If scheduling [facility] covid testing required for all procedures whether patient is vaccinated or not. -Patient informed not to eat or drink anything after midnight the night prior to the procedure, if being sedated. -Patient was advised that if he/she does receive the covid vaccine it needs to be at least 2 weeks before or after the injection. -Medications and allergies reviewed. -Patient reminded to hold NSAIDs (Ibuprofen, ASA 81, Aleve, Naproxen, Mobic etc.) for 3 days prior to [place]  if BMI is greater than 35. For Cervical injections only hold multivitamins, Vitamin E, Fish Oil, Phentermine (Lomaira) for 7 days prior to injection and NSAIDS.  mg to be held for 7 days prior to injections.  -If patient is receiving MAC/IVCS Phentermine Dallie Wick) will need to be held for 7 days prior to injection.  -If on blood thinner clearance has been received to hold this medication by provider.   -Patient informed he/she will need a  to and from procedure. -[facility] is located in the [facility] 1st floor. Patient may park in the yellow parking. Patient verbalized understanding and agrees with plan.  -----> Scheduled in Epic: Yes  -----> Scheduled in [employer]:  Yes

## 2022-03-21 ENCOUNTER — PATIENT OUTREACH (OUTPATIENT)
Dept: CASE MANAGEMENT | Age: 72
End: 2022-03-21

## 2022-03-31 PROCEDURE — 99490 CHRNC CARE MGMT STAFF 1ST 20: CPT

## 2022-04-08 ENCOUNTER — LAB REQUISITION (OUTPATIENT)
Dept: SURGERY | Age: 72
End: 2022-04-08
Payer: MEDICARE

## 2022-04-09 LAB — SARS-COV-2 RNA RESP QL NAA+PROBE: NOT DETECTED

## 2022-04-11 ENCOUNTER — OFFICE VISIT (OUTPATIENT)
Dept: SURGERY | Facility: CLINIC | Age: 72
End: 2022-04-11

## 2022-04-11 DIAGNOSIS — M48.062 LUMBAR STENOSIS WITH NEUROGENIC CLAUDICATION: Primary | ICD-10-CM

## 2022-04-11 PROCEDURE — 64483 NJX AA&/STRD TFRM EPI L/S 1: CPT | Performed by: PHYSICAL MEDICINE & REHABILITATION

## 2022-04-11 PROCEDURE — 99152 MOD SED SAME PHYS/QHP 5/>YRS: CPT | Performed by: PHYSICAL MEDICINE & REHABILITATION

## 2022-04-11 NOTE — PROCEDURES
[facility] [address]    BILATERAL LUMBAR TRANSFORAMINAL   NAME:  Kayden Quintana    MR #:    [id number] :  1950     PHYSICIAN:  Steff Chow DO        Operative Report    DATE OF PROCEDURE: 2022   PREOPERATIVE DIAGNOSES: 1. Lumbar stenosis with neurogenic claudication        POSTOPERATIVE DIAGNOSES:   1. Lumbar stenosis with neurogenic claudication        PROCEDURES: Bilateral L5 transforaminal epidural steroid injections done under fluoroscopic guidance with contrast enhancement. SURGEON: Steff Chow DO   ANESTHESIA: IV conscious sedation   INDICATIONS:      OPERATIVE PROCEDURE:  Written consent was obtained from the patient. The patient was brought into the operating room and placed in the prone position on the fluoroscopy table with pillow underneath her abdomen. The patient's skin was cleaned and draped in a normal sterile fashion. Using AP fluoroscopy, all five lumbar vertebrae were identified. When the fifth vertebra was identified, fluoroscopy was left anterior obliqued opening up the right L5-S1 intervertebral foramen. At this point in time, the patient's skin was anesthetized with 1% PF lidocaine without epinephrine. Then, a 5 inch, 22-gauge spinal needle was inserted and directed towards the right L5-S1 intervertebral foramen. When it felt to be in good position, AP fluoroscopy was used to advance the needle to the 6 o'clock position on the right L5 pedicle. At this point in time, Omnipaque-240 contrast was used to obtain a good epidurogram indicating correct needle placement. Then, aspiration was performed. No blood, fluid, or air was aspirated. Then, the patient was injected with a 3 cc solution of 1.5 cc of 6 mg/cc of celestone and 1.5 cc of 1% PF lidocaine without epinephrine. After this, the needle was removed. Then  fluoroscopy was right anterior obliqued opening up the left L5-S1 intervertebral foramen.   At this point in time, the patient's skin was anesthetized with 1 to 2 cc of 1% PF lidocaine without epinephrine. Then, a 5 inch, 22-gauge spinal needle was inserted and directed towards the left L5-S1 intervertebral foramen. When it felt to be in good position, AP fluoroscopy was used to advance the needle to the 6 o'clock position on the left L5 pedicle. At this point in time, Omnipaque-240 contrast was used to obtain a good epidurogram indicating correct needle placement. Then, aspiration was performed. No blood, fluid, or air was aspirated. Then, the patient was injected with a 3 cc solution of 1.5 cc of 6 mg/cc of celestone and 1.5 cc of 1% PF lidocaine without epinephrine. After this, the needle was removed. The patient's skin was cleaned. Band-Aids were applied. The patient was transferred to the cart and into [facility]. The patient was given discharge instructions and will follow up in the clinic as scheduled. Throughout the whole procedure, the patient's pulse oximetry and vital signs were monitored and they remained completely stable. Also, throughout the whole procedure, prior to injection of any medication, aspiration was performed. No blood, fluid, or air was aspirated at anytime. Please note that IV conscious sedation was utilized for this procedure. A total of 2mg of versed and 50mcg of fentanyl was administered over 13 minutes.     Joanna Dueñas DO  Physical Medicine and [address] [place] [address]

## 2022-04-27 ENCOUNTER — PATIENT OUTREACH (OUTPATIENT)
Dept: CASE MANAGEMENT | Age: 72
End: 2022-04-27

## 2022-04-27 NOTE — PROGRESS NOTES
Spoke to patient states he is currently busy, he will return my call tomorrow.     Total time -2  min  Total Monthly time- 2  min

## 2022-05-10 ENCOUNTER — OFFICE VISIT (OUTPATIENT)
Dept: PHYSICAL MEDICINE AND REHAB | Facility: CLINIC | Age: 72
End: 2022-05-10
Payer: MEDICARE

## 2022-05-10 VITALS
BODY MASS INDEX: 31.98 KG/M2 | DIASTOLIC BLOOD PRESSURE: 60 MMHG | HEIGHT: 68 IN | HEART RATE: 92 BPM | SYSTOLIC BLOOD PRESSURE: 110 MMHG | WEIGHT: 211 LBS | OXYGEN SATURATION: 95 %

## 2022-05-10 DIAGNOSIS — M48.062 LUMBAR STENOSIS WITH NEUROGENIC CLAUDICATION: Primary | ICD-10-CM

## 2022-05-10 PROCEDURE — 99213 OFFICE O/P EST LOW 20 MIN: CPT | Performed by: PHYSICAL MEDICINE & REHABILITATION

## 2022-05-10 RX ORDER — NORTRIPTYLINE HYDROCHLORIDE 10 MG/1
CAPSULE ORAL
Qty: 60 CAPSULE | Refills: 0 | Status: SHIPPED | OUTPATIENT
Start: 2022-05-10

## 2022-05-10 NOTE — PATIENT INSTRUCTIONS
Start the medication on a day when you know you are not going to drive in the morning, or on a day when you are working in the morning. Let me know how you are doing in 1 month.

## 2022-05-13 ENCOUNTER — OFFICE VISIT (OUTPATIENT)
Dept: INTERNAL MEDICINE CLINIC | Facility: CLINIC | Age: 72
End: 2022-05-13
Payer: MEDICARE

## 2022-05-13 VITALS
SYSTOLIC BLOOD PRESSURE: 101 MMHG | BODY MASS INDEX: 31.98 KG/M2 | DIASTOLIC BLOOD PRESSURE: 66 MMHG | WEIGHT: 211 LBS | HEIGHT: 68 IN | OXYGEN SATURATION: 95 % | RESPIRATION RATE: 14 BRPM | HEART RATE: 112 BPM

## 2022-05-13 DIAGNOSIS — J44.9 CHRONIC OBSTRUCTIVE PULMONARY DISEASE, UNSPECIFIED COPD TYPE (HCC): ICD-10-CM

## 2022-05-13 DIAGNOSIS — E78.2 MIXED HYPERLIPIDEMIA: ICD-10-CM

## 2022-05-13 DIAGNOSIS — M54.16 LUMBAR RADICULOPATHY: ICD-10-CM

## 2022-05-13 DIAGNOSIS — I10 ESSENTIAL HYPERTENSION WITH GOAL BLOOD PRESSURE LESS THAN 140/90: Primary | ICD-10-CM

## 2022-05-13 PROCEDURE — 99214 OFFICE O/P EST MOD 30 MIN: CPT | Performed by: INTERNAL MEDICINE

## 2022-06-23 ENCOUNTER — PATIENT OUTREACH (OUTPATIENT)
Dept: CASE MANAGEMENT | Age: 72
End: 2022-06-23

## 2022-06-23 DIAGNOSIS — E78.2 MIXED HYPERLIPIDEMIA: ICD-10-CM

## 2022-06-23 DIAGNOSIS — I10 ESSENTIAL HYPERTENSION WITH GOAL BLOOD PRESSURE LESS THAN 140/90: ICD-10-CM

## 2022-06-23 DIAGNOSIS — E66.09 CLASS 1 OBESITY DUE TO EXCESS CALORIES WITHOUT SERIOUS COMORBIDITY WITH BODY MASS INDEX (BMI) OF 31.0 TO 31.9 IN ADULT: ICD-10-CM

## 2022-06-23 DIAGNOSIS — E66.3 OVERWEIGHT (BMI 25.0-29.9): ICD-10-CM

## 2022-07-15 ENCOUNTER — OFFICE VISIT (OUTPATIENT)
Dept: DERMATOLOGY CLINIC | Facility: CLINIC | Age: 72
End: 2022-07-15
Payer: MEDICARE

## 2022-07-15 DIAGNOSIS — L30.9 DERMATITIS: Primary | ICD-10-CM

## 2022-07-15 DIAGNOSIS — B07.9 VERRUCA(E): ICD-10-CM

## 2022-07-15 DIAGNOSIS — L21.9 SEBORRHEIC DERMATITIS: ICD-10-CM

## 2022-07-15 PROCEDURE — 99213 OFFICE O/P EST LOW 20 MIN: CPT | Performed by: PHYSICIAN ASSISTANT

## 2022-07-15 PROCEDURE — 1126F AMNT PAIN NOTED NONE PRSNT: CPT | Performed by: PHYSICIAN ASSISTANT

## 2022-07-15 PROCEDURE — 17110 DESTRUCTION B9 LES UP TO 14: CPT | Performed by: PHYSICIAN ASSISTANT

## 2022-07-15 RX ORDER — KETOCONAZOLE 20 MG/G
CREAM TOPICAL
Qty: 30 G | Refills: 1 | Status: SHIPPED | OUTPATIENT
Start: 2022-07-15

## 2022-07-15 RX ORDER — MOMETASONE FUROATE 1 MG/G
1 OINTMENT TOPICAL DAILY
Qty: 30 G | Refills: 3 | Status: SHIPPED | OUTPATIENT
Start: 2022-07-15

## 2022-07-21 ENCOUNTER — OFFICE VISIT (OUTPATIENT)
Dept: PHYSICAL MEDICINE AND REHAB | Facility: CLINIC | Age: 72
End: 2022-07-21
Payer: MEDICARE

## 2022-07-21 VITALS
HEART RATE: 84 BPM | SYSTOLIC BLOOD PRESSURE: 118 MMHG | HEIGHT: 68 IN | BODY MASS INDEX: 31.98 KG/M2 | DIASTOLIC BLOOD PRESSURE: 64 MMHG | WEIGHT: 211 LBS | OXYGEN SATURATION: 95 %

## 2022-07-21 DIAGNOSIS — M54.16 LUMBAR RADICULOPATHY: Primary | ICD-10-CM

## 2022-07-21 PROCEDURE — 99213 OFFICE O/P EST LOW 20 MIN: CPT | Performed by: PHYSICAL MEDICINE & REHABILITATION

## 2022-07-21 RX ORDER — DULOXETIN HYDROCHLORIDE 30 MG/1
30 CAPSULE, DELAYED RELEASE ORAL DAILY
Qty: 60 CAPSULE | Refills: 0 | Status: SHIPPED | OUTPATIENT
Start: 2022-07-21

## 2022-07-29 ENCOUNTER — PATIENT OUTREACH (OUTPATIENT)
Dept: CASE MANAGEMENT | Age: 72
End: 2022-07-29

## 2022-08-09 ENCOUNTER — PATIENT OUTREACH (OUTPATIENT)
Dept: CASE MANAGEMENT | Age: 72
End: 2022-08-09

## 2022-08-09 DIAGNOSIS — E78.2 MIXED HYPERLIPIDEMIA: ICD-10-CM

## 2022-08-09 DIAGNOSIS — E66.09 CLASS 1 OBESITY DUE TO EXCESS CALORIES WITHOUT SERIOUS COMORBIDITY WITH BODY MASS INDEX (BMI) OF 31.0 TO 31.9 IN ADULT: ICD-10-CM

## 2022-08-09 DIAGNOSIS — M54.16 LUMBAR RADICULOPATHY: ICD-10-CM

## 2022-08-09 DIAGNOSIS — I10 ESSENTIAL HYPERTENSION WITH GOAL BLOOD PRESSURE LESS THAN 140/90: ICD-10-CM

## 2022-09-19 DIAGNOSIS — M54.16 LUMBAR RADICULOPATHY: ICD-10-CM

## 2022-09-20 NOTE — TELEPHONE ENCOUNTER
Refill Request    Medication request: DULoxetine 30 MG Oral Cap DR Particles. Take 1 capsule (30 mg total) by mouth daily. FRL:7/75/4864 Ping Kern, DO   Due back to clinic per last office note: Per Dr. Sarahy Montelongo: \"He will contact me in 4 weeks either through [address] or call the office for a status update. \"  NOV: 10/06/2022 [facility] 07/22/[facility]22 [Confirmed with Pharmacy] #60    Urine drug screen (if applicable): n/a  Pain contract: n/a    LOV plan (if weaning or changing medications): Per Dr. Sarahy Montelongo: \"He will contact me in 4 weeks either through [address] or call the office for a status update; if no side effects and no benefit I may ask him to increase the dose to 60mg qDaily. \"      S/w patient to get condition update: patient stating that his pain remains the same, w/o change. No side effects noted. Made NOV for patient to discuss next steps, but inquired if patient would be comfortable increasing the dose to 60MG as per Dr. Davida Espinal note. Patient verbalized agreement with plan. - Will pend 60MG Duloxetine to Dr. Sarahy Montelongo for his review/siganature if appropriate.

## 2022-09-22 RX ORDER — DULOXETIN HYDROCHLORIDE 30 MG/1
60 CAPSULE, DELAYED RELEASE ORAL DAILY
Qty: 60 CAPSULE | Refills: 0 | Status: SHIPPED | OUTPATIENT
Start: 2022-09-22

## 2022-09-30 ENCOUNTER — PATIENT OUTREACH (OUTPATIENT)
Dept: CASE MANAGEMENT | Age: 72
End: 2022-09-30

## 2022-10-06 ENCOUNTER — OFFICE VISIT (OUTPATIENT)
Dept: PHYSICAL MEDICINE AND REHAB | Facility: CLINIC | Age: 72
End: 2022-10-06
Payer: MEDICARE

## 2022-10-06 ENCOUNTER — TELEPHONE (OUTPATIENT)
Dept: NEUROLOGY | Facility: CLINIC | Age: 72
End: 2022-10-06

## 2022-10-06 VITALS
OXYGEN SATURATION: 95 % | SYSTOLIC BLOOD PRESSURE: 112 MMHG | WEIGHT: 212 LBS | HEIGHT: 68 IN | DIASTOLIC BLOOD PRESSURE: 64 MMHG | HEART RATE: 97 BPM | BODY MASS INDEX: 32.13 KG/M2

## 2022-10-06 DIAGNOSIS — M47.816 LUMBAR FACET ARTHROPATHY: Primary | ICD-10-CM

## 2022-10-06 PROCEDURE — 99214 OFFICE O/P EST MOD 30 MIN: CPT | Performed by: PHYSICAL MEDICINE & REHABILITATION

## 2022-10-06 NOTE — TELEPHONE ENCOUNTER
Bilateral L3, L4, L5 medial nerve branch blocks under fluoroscopy    CPT CODE: R4196092, 13082 RT, 00286 LT    Status: Authorization is not required per health plan however, may be subject to review once claim is submitted. Per Medicare Guidelines; request is a covered benefit and pre-certification is not require. All Medicare coverage is based on Medical Necessity. Notified nursing for scheduling   FYI: Per CMS Guidelines: One to two levels, either unilateral or bilateral, are allowed per session per spine region. The need for a three or four-level procedure bilaterally may be considered under unique circumstances and with sufficient documentation of medical necessity on appeal. A session is a time period, which includes all procedures (i.e., medial branch block (MBB), intraarticular injections (IA), facet cyst ruptures, and RFA ablations that are performed during the same day.

## 2022-10-07 NOTE — TELEPHONE ENCOUNTER
BMI: 32.23 kg/m2: NO RESTRICTIONS with injection. Patient has been scheduled for Bilateral  L4, L5 medial nerve branch blocks under fluoroscopy  on 10/24/2 at the [address] [place] [address] with Dr. Soraya Rodrigues. -Anesthesia type: IVCS. -If scheduling [facility] covid testing required for all procedures whether patient is vaccinated or not. -Patient informed not to eat or drink anything after midnight the night prior to the procedure, if being sedated. -Patient was advised that if he/she does receive the covid vaccine it needs to be at least 2 weeks before or after the injection. -Medications and allergies reviewed. -Patient reminded to hold NSAIDs (Ibuprofen, ASA 81, Aleve, Naproxen, Mobic, Diclofenac, Etodolac, Celebrex etc.) for 3 days prior to [facility]  if BMI is greater than 35. For Cervical injections only hold multivitamins, Vitamin E, Fish Oil, Phentermine (Lomaira) for 7 days prior to injection and NSAIDS.  mg to be held for 7 days prior to injections.  -If patient is receiving MAC/IVCS Phentermine Muriel Or) will need to be held for 7 days prior to injection.  -If on blood thinner clearance has been received to hold this medication by provider.   -Patient informed he/she will need a  to and from procedure. -[facility] is located in the [facility] 1st floor. Patient may park in the yellow parking. Patient verbalized understanding and agrees with plan.  -----> Scheduled in Epic: Yes  -----> Scheduled in [employer]:  Yes

## 2022-10-17 ENCOUNTER — OFFICE VISIT (OUTPATIENT)
Dept: INTERNAL MEDICINE CLINIC | Facility: CLINIC | Age: 72
End: 2022-10-17
Payer: MEDICARE

## 2022-10-17 VITALS
DIASTOLIC BLOOD PRESSURE: 68 MMHG | RESPIRATION RATE: 14 BRPM | HEART RATE: 110 BPM | WEIGHT: 212 LBS | SYSTOLIC BLOOD PRESSURE: 112 MMHG | HEIGHT: 68 IN | OXYGEN SATURATION: 98 % | BODY MASS INDEX: 32.13 KG/M2

## 2022-10-17 DIAGNOSIS — I70.0 ATHEROSCLEROSIS OF AORTA (HCC): Primary | ICD-10-CM

## 2022-10-17 DIAGNOSIS — N52.9 IMPOTENCE OF ORGANIC ORIGIN: ICD-10-CM

## 2022-10-17 DIAGNOSIS — F17.210 CIGARETTE NICOTINE DEPENDENCE WITHOUT COMPLICATION: ICD-10-CM

## 2022-10-17 DIAGNOSIS — R73.03 PREDIABETES: ICD-10-CM

## 2022-10-17 DIAGNOSIS — H26.499 POSTERIOR CAPSULAR OPACIFICATION, UNSPECIFIED LATERALITY: ICD-10-CM

## 2022-10-17 DIAGNOSIS — J44.9 CHRONIC OBSTRUCTIVE PULMONARY DISEASE, UNSPECIFIED COPD TYPE (HCC): ICD-10-CM

## 2022-10-17 DIAGNOSIS — E66.09 CLASS 1 OBESITY DUE TO EXCESS CALORIES WITHOUT SERIOUS COMORBIDITY WITH BODY MASS INDEX (BMI) OF 31.0 TO 31.9 IN ADULT: ICD-10-CM

## 2022-10-17 DIAGNOSIS — Z12.5 SCREENING PSA (PROSTATE SPECIFIC ANTIGEN): ICD-10-CM

## 2022-10-17 DIAGNOSIS — M71.38 SYNOVIAL CYST OF LUMBAR SPINE: ICD-10-CM

## 2022-10-17 DIAGNOSIS — K64.8 INTERNAL HEMORRHOIDS: ICD-10-CM

## 2022-10-17 DIAGNOSIS — I10 ESSENTIAL HYPERTENSION WITH GOAL BLOOD PRESSURE LESS THAN 140/90: ICD-10-CM

## 2022-10-17 DIAGNOSIS — L71.9 ROSACEA: ICD-10-CM

## 2022-10-17 DIAGNOSIS — Z96.1 PSEUDOPHAKIA OF BOTH EYES: ICD-10-CM

## 2022-10-17 DIAGNOSIS — J43.1 PANLOBULAR EMPHYSEMA (HCC): ICD-10-CM

## 2022-10-17 DIAGNOSIS — M54.16 LUMBAR RADICULOPATHY: ICD-10-CM

## 2022-10-17 DIAGNOSIS — M19.019 OSTEOARTHRITIS OF SHOULDER, UNSPECIFIED LATERALITY, UNSPECIFIED OSTEOARTHRITIS TYPE: ICD-10-CM

## 2022-10-17 DIAGNOSIS — E78.2 MIXED HYPERLIPIDEMIA: ICD-10-CM

## 2022-10-17 PROBLEM — E66.3 OVERWEIGHT (BMI 25.0-29.9): Status: RESOLVED | Noted: 2017-02-21 | Resolved: 2022-10-17

## 2022-10-22 ENCOUNTER — PATIENT OUTREACH (OUTPATIENT)
Dept: CASE MANAGEMENT | Age: 72
End: 2022-10-22

## 2022-10-22 DIAGNOSIS — M54.16 LUMBAR RADICULOPATHY: ICD-10-CM

## 2022-10-22 DIAGNOSIS — I10 ESSENTIAL HYPERTENSION WITH GOAL BLOOD PRESSURE LESS THAN 140/90: ICD-10-CM

## 2022-10-22 DIAGNOSIS — E78.2 MIXED HYPERLIPIDEMIA: ICD-10-CM

## 2022-10-22 DIAGNOSIS — R73.03 PREDIABETES: ICD-10-CM

## 2022-10-22 DIAGNOSIS — E66.09 CLASS 1 OBESITY DUE TO EXCESS CALORIES WITHOUT SERIOUS COMORBIDITY WITH BODY MASS INDEX (BMI) OF 31.0 TO 31.9 IN ADULT: ICD-10-CM

## 2022-10-24 ENCOUNTER — TELEPHONE (OUTPATIENT)
Dept: PHYSICAL MEDICINE AND REHAB | Facility: CLINIC | Age: 72
End: 2022-10-24

## 2022-10-24 ENCOUNTER — OFFICE VISIT (OUTPATIENT)
Dept: SURGERY | Facility: CLINIC | Age: 72
End: 2022-10-24

## 2022-10-24 DIAGNOSIS — M47.816 LUMBAR FACET ARTHROPATHY: Primary | ICD-10-CM

## 2022-10-24 NOTE — PROCEDURES
[facility]    LUMBAR MEDIAL BRANCH BLOCK   NAME:  Binh Rajan    MR #:    [id number] :  1950     PHYSICIAN:  Bennie Lopez DO        Operative Report    DATE OF PROCEDURE: 10/24/2022   PREOPERATIVE DIAGNOSES: 1. Lumbar facet arthropathy        POSTOPERATIVE DIAGNOSES:   1. Lumbar facet arthropathy        PROCEDURES: bilateral L4 and L5 Medial Branch Block done under fluoroscopic guidance with contrast enhancement. SURGEON: Bennie Lopez DO   ANESTHESIA: IV conscious sedation   INDICATIONS: Chronic axial low back pain, worse with standing and walking     OPERATIVE PROCEDURE:  Written consent was obtained from the patient. The patient was brought into the operating room and placed in the prone position on the fluoroscopy table with pillow underneath her abdomen. The patient's skin was cleaned and draped in a normal sterile fashion. Using AP fluoroscopy, all five lumbar vertebrae were identified. When the fifth vertebrae were identified, marks were placed on the patient's skin overlying were the bilateral transverse process meets the lamina at these levels. Also, fluoroscopy was used to identify  bilateral sacral ala. At this point in time, the patient's skin was anesthetized with 1% PF lidocaine without epinephrine overlying each needle site. Then, a 22 gauge needle was inserted and directed to the above stated sites. When they felt to be in good position, oblique fluoroscopy was used to confirm needle placement at the eye of the Panchito dog each level. At this time, Omnipaque 240 contrast was used to obtain a good fascial pattern at each site. Aspiration was performed and no blood, fluid, or air were aspirated. Then, each site was injected with 0.5cc of 1% PF lidocaine without epinephrine and 20mg of kenalog. After this, the needles were removed. The patient's skin was cleaned. Band-Aids were applied. The patient was transferred to the cart and into [facility].   The patient was given discharge instructions and will follow up in the clinic as scheduled. Throughout the whole procedure, the patient's pulse oximetry and vital signs were monitored and they remained completely stable. Also, throughout the whole procedure, prior to injection of any medication, aspiration was performed. No blood, fluid, or air was aspirated at anytime. IV conscious sedation was utilized for situational anxiety. A total of 2mg of versed and 50mcg of fentanyl was administered over 20 minutes. He will contact the clinic with a status update as soon as he gets home.      Claude Guo DO  Physical Medicine and [address] [place] [address]

## 2022-10-25 NOTE — TELEPHONE ENCOUNTER
Glad to hear it; have him follow up either telephone on call or in person so we can discuss radiofrequency ablation and if/when to have it done.

## 2022-10-25 NOTE — TELEPHONE ENCOUNTER
Spoke with patient states about 100% improvement presently. Denies any side effects/worsening symptoms. Rates pain 1/10. Patient would like to continue with procedure.

## 2022-10-27 NOTE — PROGRESS NOTES
6/25/2019  Spoke to [place] for [facility].     Dean ONDINA    Introduced myself as new [facility]      Patient Active Problem List:     Posterior capsule opacification     Pseudophakia of both eyes     Dermatochalasis of eyelid     MGD (meibomian gland dysfunction)     C states he takes his bp med as he is supposed to. Diet- pt relates his diet is normal. He denies cooking with salt at home and mostly uses pepper. Back pain: pt discussed his history of back pain.  He relates that his back pain has always been an iss 0 numerical 0-10

## 2022-11-03 ENCOUNTER — TELEPHONE (OUTPATIENT)
Dept: PHYSICAL MEDICINE AND REHAB | Facility: CLINIC | Age: 72
End: 2022-11-03

## 2022-11-03 ENCOUNTER — OFFICE VISIT (OUTPATIENT)
Dept: PHYSICAL MEDICINE AND REHAB | Facility: CLINIC | Age: 72
End: 2022-11-03
Payer: MEDICARE

## 2022-11-03 VITALS
WEIGHT: 212 LBS | DIASTOLIC BLOOD PRESSURE: 60 MMHG | SYSTOLIC BLOOD PRESSURE: 108 MMHG | HEIGHT: 68 IN | BODY MASS INDEX: 32.13 KG/M2

## 2022-11-03 DIAGNOSIS — M47.816 LUMBAR FACET ARTHROPATHY: Primary | ICD-10-CM

## 2022-11-03 PROCEDURE — 99214 OFFICE O/P EST MOD 30 MIN: CPT | Performed by: PHYSICAL MEDICINE & REHABILITATION

## 2022-11-03 NOTE — TELEPHONE ENCOUNTER
Per Medicare Guidelines -no authorization is required for lumbar RFA    Status: Authorization is not required however may be subject to review once claim is submitted-Covered Benefit     Clinical staff can proceed with scheduling Bilateral L3, L4, L5 medial nerve RFA under fluoroscopy CPT [id number]     Per CMS Guidelines-One to two levels, either unilateral or bilateral, are allowed per session per spine region. The need for a three or four-level procedure bilaterally may be considered under unique circumstances and with sufficient documentation of medical necessity on appeal. A session is a time period, which includes all procedures (i.e., medial branch block (MBB), intraarticular injections (IA), facet cyst ruptures, and RFA ablations that are performed during the same day. Frequency Limitation: For each covered spinal region no more than two (2) radiofrequency sessions will be reimbursed per rolling 12 months.     Per Dr. Lo Raymond w/ IVCS

## 2022-11-08 NOTE — TELEPHONE ENCOUNTER
Patient has been scheduled for Bilateral L3, L4, L5 medial nerve RFA under fluoroscopy on 11/28/22 at the [facility] with Dr. Becky Pastor. -Anesthesia type: IVCS. -Patient informed to fast 12 hours prior to procedure with IVCS/MAC.   -Scheduling [address] covid testing required for all procedures whether patient is vaccinated or not. -Patient was advised that if he/she does receive the covid vaccine it needs to be at least 2 weeks before or after the injection. -Medications and allergies reviewed. -Patient reminded to hold NSAIDs (Ibuprofen, ASA 81, Aleve, Naproxen, Mobic, Diclofenac, Etodolac, Celebrex etc.) for 3 days prior to Lumbar MBB/Facet if BMI is greater than 35. For Cervical injections only hold multivitamins, Vitamin E, Fish Oil, Phentermine/Lomaira for 7 days prior to injection and NSAIDS.  mg to be held for 7 days prior to injections.  -If patient is receiving MAC/IVCS Phentermine Marlon [place]) will need to be held for 7 days prior to injection.  -If on blood thinner clearance has been received to hold this medication by provider.   -Patient informed he/she will need a  to and from procedure. [facility] Rito is located in the [facility] [address] 1st floor,  may park in the yellow/purple parking lot. Patient verbalized understanding and agrees with plan.  -----> Scheduled in Epic: Yes  -----> Scheduled in [employer]:  Yes

## 2022-11-21 ENCOUNTER — PATIENT OUTREACH (OUTPATIENT)
Dept: CASE MANAGEMENT | Age: 72
End: 2022-11-21

## 2022-11-21 DIAGNOSIS — I10 ESSENTIAL HYPERTENSION WITH GOAL BLOOD PRESSURE LESS THAN 140/90: ICD-10-CM

## 2022-11-21 DIAGNOSIS — E66.09 CLASS 1 OBESITY DUE TO EXCESS CALORIES WITHOUT SERIOUS COMORBIDITY WITH BODY MASS INDEX (BMI) OF 31.0 TO 31.9 IN ADULT: ICD-10-CM

## 2022-11-21 DIAGNOSIS — R73.03 PREDIABETES: ICD-10-CM

## 2022-11-21 DIAGNOSIS — E78.2 MIXED HYPERLIPIDEMIA: ICD-10-CM

## 2022-11-21 DIAGNOSIS — F17.210 CIGARETTE NICOTINE DEPENDENCE WITHOUT COMPLICATION: ICD-10-CM

## 2022-11-28 ENCOUNTER — OFFICE VISIT (OUTPATIENT)
Dept: SURGERY | Facility: CLINIC | Age: 72
End: 2022-11-28
Payer: MEDICARE

## 2022-11-28 DIAGNOSIS — M47.816 LUMBAR FACET ARTHROPATHY: Primary | ICD-10-CM

## 2022-11-28 NOTE — PROCEDURES
Fabian [address]   NAME:  Bethel Ogden    MR #:    [id number] :  1950     PHYSICIAN:  Sita Javier DO        Operative Report    DATE OF PROCEDURE: 2022   PREOPERATIVE DIAGNOSES: 1. Lumbar facet arthropathy        POSTOPERATIVE DIAGNOSES:   1. Lumbar facet arthropathy        PROCEDURES: bilateral L3, L4 and L5 Medial Branch Radiofrequency Neurotomy done under fluoroscopic guidance with contrast enhancement. SURGEON: Sita Javier DO   ANESTHESIA: IV conscious sedation   INDICATIONS: Chronic axial low back pain, extension provoked; + response from MNBB     OPERATIVE PROCEDURE:  Written consent was obtained from the patient. The patient was brought into the operating room and placed in the prone position on the fluoroscopy table with pillow underneath her abdomen. The patient's skin was cleaned and draped in a normal sterile fashion. Using AP fluoroscopy, all five lumbar vertebrae were identified. When the fourth and fifth vertebrae were identified, marks were placed on the patient's skin overlying where the bilateral transverse process meets the lamina at these levels. Also, bilateral anterior-oblique fluoroscopy was used to identify  bilateral sacral ala. At this point in time, the patient's skin was anesthetized with 1% PF lidocaine without epinephrine overlying each needle site. Then, 18 gauge radiofrequency needles with 10mm exposed curved tips were inserted and directed to the above stated sites. When they felt to be in good position, oblique fluoroscopy was used to confirm needle placement at the eye of the Panchito dog each level. At this point in time, sensory and motor stimulation was performed. When there was a good sensory and motor response or no adverse response, each needle tip was heated to 80 degrees Celsius for 90 seconds.  Prior to the lesioning, each site was anesthetized with 1 ml of 2% PF lidocaine without epinephrine. After this, the needles were removed. The patient's skin was cleaned. Band-Aids were applied. The patient was transferred to the cart and into [facility]. The patient was given discharge instructions and will follow up in the clinic as scheduled. Throughout the whole procedure, the patient's pulse oximetry and vital signs were monitored and they remained completely stable. Also, throughout the whole procedure, prior to injection of any medication, aspiration was performed. No blood, fluid, or air was aspirated at anytime. IV conscious sedation was utilized for this procedure. A total of 3mg of versed and 75mcg of fentanyl was administered over 55 minutes.      [facility]  Physical Medicine and [address]

## 2022-12-19 ENCOUNTER — LAB ENCOUNTER (OUTPATIENT)
Dept: LAB | Age: 72
End: 2022-12-19
Attending: INTERNAL MEDICINE
Payer: MEDICARE

## 2022-12-19 DIAGNOSIS — E78.2 MIXED HYPERLIPIDEMIA: ICD-10-CM

## 2022-12-19 DIAGNOSIS — I10 ESSENTIAL HYPERTENSION WITH GOAL BLOOD PRESSURE LESS THAN 140/90: ICD-10-CM

## 2022-12-19 DIAGNOSIS — R73.03 PREDIABETES: ICD-10-CM

## 2022-12-19 DIAGNOSIS — Z12.5 SCREENING PSA (PROSTATE SPECIFIC ANTIGEN): ICD-10-CM

## 2022-12-19 LAB
ALBUMIN SERPL-MCNC: 3.4 G/DL (ref 3.4–5)
ALBUMIN/GLOB SERPL: 0.9 {RATIO} (ref 1–2)
ALP LIVER SERPL-CCNC: 66 U/L
ALT SERPL-CCNC: 30 U/L
ANION GAP SERPL CALC-SCNC: 5 MMOL/L (ref 0–18)
AST SERPL-CCNC: 13 U/L (ref 15–37)
BILIRUB SERPL-MCNC: 0.7 MG/DL (ref 0.1–2)
BUN BLD-MCNC: 20 MG/DL (ref 7–18)
BUN/CREAT SERPL: 23.5 (ref 10–20)
CALCIUM BLD-MCNC: 9 MG/DL (ref 8.5–10.1)
CHLORIDE SERPL-SCNC: 104 MMOL/L (ref 98–112)
CHOLEST SERPL-MCNC: 90 MG/DL (ref ?–200)
CO2 SERPL-SCNC: 30 MMOL/L (ref 21–32)
COMPLEXED PSA SERPL-MCNC: 1.15 NG/ML (ref ?–4)
CREAT BLD-MCNC: 0.85 MG/DL
DEPRECATED RDW RBC AUTO: 50.6 FL (ref 35.1–46.3)
ERYTHROCYTE [DISTWIDTH] IN BLOOD BY AUTOMATED COUNT: 13.6 % (ref 11–15)
EST. AVERAGE GLUCOSE BLD GHB EST-MCNC: 120 MG/DL (ref 68–126)
FASTING PATIENT LIPID ANSWER: YES
FASTING STATUS PATIENT QL REPORTED: YES
GFR SERPLBLD BASED ON 1.73 SQ M-ARVRAT: 91 ML/MIN/1.73M2 (ref 60–?)
GLOBULIN PLAS-MCNC: 3.6 G/DL (ref 2.8–4.4)
GLUCOSE BLD-MCNC: 116 MG/DL (ref 70–99)
HBA1C MFR BLD: 5.8 % (ref ?–5.7)
HCT VFR BLD AUTO: 46.5 %
HDLC SERPL-MCNC: 45 MG/DL (ref 40–59)
HGB BLD-MCNC: 15 G/DL
LDLC SERPL CALC-MCNC: 28 MG/DL (ref ?–100)
MCH RBC QN AUTO: 32.3 PG (ref 26–34)
MCHC RBC AUTO-ENTMCNC: 32.3 G/DL (ref 31–37)
MCV RBC AUTO: 100 FL
NONHDLC SERPL-MCNC: 45 MG/DL (ref ?–130)
OSMOLALITY SERPL CALC.SUM OF ELEC: 292 MOSM/KG (ref 275–295)
PLATELET # BLD AUTO: 252 10(3)UL (ref 150–450)
POTASSIUM SERPL-SCNC: 4.7 MMOL/L (ref 3.5–5.1)
PROT SERPL-MCNC: 7 G/DL (ref 6.4–8.2)
RBC # BLD AUTO: 4.65 X10(6)UL
SODIUM SERPL-SCNC: 139 MMOL/L (ref 136–145)
TRIGL SERPL-MCNC: 87 MG/DL (ref 30–149)
TSI SER-ACNC: 1.48 MIU/ML (ref 0.36–3.74)
VLDLC SERPL CALC-MCNC: 12 MG/DL (ref 0–30)
WBC # BLD AUTO: 12.2 X10(3) UL (ref 4–11)

## 2022-12-19 PROCEDURE — 80053 COMPREHEN METABOLIC PANEL: CPT

## 2022-12-19 PROCEDURE — 85027 COMPLETE CBC AUTOMATED: CPT

## 2022-12-19 PROCEDURE — 36415 COLL VENOUS BLD VENIPUNCTURE: CPT

## 2022-12-19 PROCEDURE — 84443 ASSAY THYROID STIM HORMONE: CPT

## 2022-12-19 PROCEDURE — 83036 HEMOGLOBIN GLYCOSYLATED A1C: CPT

## 2022-12-19 PROCEDURE — 80061 LIPID PANEL: CPT

## 2022-12-27 ENCOUNTER — PATIENT OUTREACH (OUTPATIENT)
Dept: CASE MANAGEMENT | Age: 72
End: 2022-12-27

## 2022-12-27 DIAGNOSIS — E66.09 CLASS 1 OBESITY DUE TO EXCESS CALORIES WITHOUT SERIOUS COMORBIDITY WITH BODY MASS INDEX (BMI) OF 31.0 TO 31.9 IN ADULT: ICD-10-CM

## 2022-12-27 DIAGNOSIS — R73.03 PREDIABETES: ICD-10-CM

## 2022-12-27 DIAGNOSIS — E78.2 MIXED HYPERLIPIDEMIA: ICD-10-CM

## 2022-12-27 DIAGNOSIS — I10 ESSENTIAL HYPERTENSION WITH GOAL BLOOD PRESSURE LESS THAN 140/90: ICD-10-CM

## 2022-12-27 DIAGNOSIS — I70.0 ATHEROSCLEROSIS OF AORTA (HCC): ICD-10-CM

## 2022-12-27 RX ORDER — SIMVASTATIN 20 MG
20 TABLET ORAL EVERY EVENING
Qty: 90 TABLET | Refills: 3 | Status: CANCELLED | OUTPATIENT
Start: 2022-12-27

## 2022-12-27 RX ORDER — LISINOPRIL 10 MG/1
10 TABLET ORAL DAILY
Qty: 90 TABLET | Refills: 3 | Status: CANCELLED | OUTPATIENT
Start: 2022-12-27

## 2022-12-29 RX ORDER — SIMVASTATIN 20 MG
20 TABLET ORAL EVERY EVENING
Qty: 90 TABLET | Refills: 3 | Status: SHIPPED | OUTPATIENT
Start: 2022-12-29

## 2022-12-29 RX ORDER — LISINOPRIL 10 MG/1
10 TABLET ORAL DAILY
Qty: 90 TABLET | Refills: 3 | Status: SHIPPED | OUTPATIENT
Start: 2022-12-29

## 2023-02-01 ENCOUNTER — PATIENT OUTREACH (OUTPATIENT)
Dept: CASE MANAGEMENT | Age: 73
End: 2023-02-01

## 2023-02-01 DIAGNOSIS — R73.03 PREDIABETES: ICD-10-CM

## 2023-02-01 DIAGNOSIS — I70.0 ATHEROSCLEROSIS OF AORTA (HCC): ICD-10-CM

## 2023-02-01 DIAGNOSIS — E66.09 CLASS 1 OBESITY DUE TO EXCESS CALORIES WITHOUT SERIOUS COMORBIDITY WITH BODY MASS INDEX (BMI) OF 31.0 TO 31.9 IN ADULT: ICD-10-CM

## 2023-02-01 DIAGNOSIS — E78.2 MIXED HYPERLIPIDEMIA: ICD-10-CM

## 2023-02-01 DIAGNOSIS — M54.16 LUMBAR RADICULOPATHY: ICD-10-CM

## 2023-03-17 ENCOUNTER — PATIENT OUTREACH (OUTPATIENT)
Dept: CASE MANAGEMENT | Age: 73
End: 2023-03-17

## 2023-04-17 ENCOUNTER — OFFICE VISIT (OUTPATIENT)
Dept: INTERNAL MEDICINE CLINIC | Facility: CLINIC | Age: 73
End: 2023-04-17

## 2023-04-17 VITALS
OXYGEN SATURATION: 95 % | DIASTOLIC BLOOD PRESSURE: 70 MMHG | HEIGHT: 68 IN | BODY MASS INDEX: 32.13 KG/M2 | WEIGHT: 212 LBS | SYSTOLIC BLOOD PRESSURE: 110 MMHG | HEART RATE: 88 BPM | RESPIRATION RATE: 14 BRPM

## 2023-04-17 DIAGNOSIS — E78.2 MIXED HYPERLIPIDEMIA: ICD-10-CM

## 2023-04-17 DIAGNOSIS — I10 ESSENTIAL HYPERTENSION WITH GOAL BLOOD PRESSURE LESS THAN 140/90: Primary | ICD-10-CM

## 2023-04-17 DIAGNOSIS — J43.1 PANLOBULAR EMPHYSEMA (HCC): ICD-10-CM

## 2023-04-17 PROCEDURE — 3008F BODY MASS INDEX DOCD: CPT | Performed by: INTERNAL MEDICINE

## 2023-04-17 PROCEDURE — 1126F AMNT PAIN NOTED NONE PRSNT: CPT | Performed by: INTERNAL MEDICINE

## 2023-04-17 PROCEDURE — 99214 OFFICE O/P EST MOD 30 MIN: CPT | Performed by: INTERNAL MEDICINE

## 2023-04-17 PROCEDURE — 3074F SYST BP LT 130 MM HG: CPT | Performed by: INTERNAL MEDICINE

## 2023-04-17 PROCEDURE — 3078F DIAST BP <80 MM HG: CPT | Performed by: INTERNAL MEDICINE

## 2023-04-17 RX ORDER — PREGABALIN 50 MG/1
100 CAPSULE ORAL 2 TIMES DAILY
COMMUNITY

## 2023-04-17 RX ORDER — PREGABALIN 25 MG/1
25 CAPSULE ORAL 2 TIMES DAILY
COMMUNITY
Start: 2023-02-16 | End: 2023-04-17

## 2023-05-03 ENCOUNTER — PATIENT OUTREACH (OUTPATIENT)
Dept: CASE MANAGEMENT | Age: 73
End: 2023-05-03

## 2023-05-03 DIAGNOSIS — E78.2 MIXED HYPERLIPIDEMIA: ICD-10-CM

## 2023-05-03 DIAGNOSIS — R73.03 PREDIABETES: ICD-10-CM

## 2023-05-03 DIAGNOSIS — E66.09 CLASS 1 OBESITY DUE TO EXCESS CALORIES WITHOUT SERIOUS COMORBIDITY WITH BODY MASS INDEX (BMI) OF 31.0 TO 31.9 IN ADULT: ICD-10-CM

## 2023-05-03 DIAGNOSIS — M54.16 LUMBAR RADICULOPATHY: ICD-10-CM

## 2023-06-13 ENCOUNTER — PATIENT OUTREACH (OUTPATIENT)
Dept: CASE MANAGEMENT | Age: 73
End: 2023-06-13

## 2023-06-13 DIAGNOSIS — E78.2 MIXED HYPERLIPIDEMIA: ICD-10-CM

## 2023-06-13 DIAGNOSIS — M54.16 LUMBAR RADICULOPATHY: ICD-10-CM

## 2023-06-13 DIAGNOSIS — R73.03 PREDIABETES: ICD-10-CM

## 2023-06-13 DIAGNOSIS — I10 ESSENTIAL HYPERTENSION WITH GOAL BLOOD PRESSURE LESS THAN 140/90: ICD-10-CM

## 2023-06-13 DIAGNOSIS — I70.0 ATHEROSCLEROSIS OF AORTA (HCC): ICD-10-CM

## 2023-06-13 DIAGNOSIS — E66.09 CLASS 1 OBESITY DUE TO EXCESS CALORIES WITHOUT SERIOUS COMORBIDITY WITH BODY MASS INDEX (BMI) OF 31.0 TO 31.9 IN ADULT: ICD-10-CM

## 2023-06-13 NOTE — PROGRESS NOTES
Pt called left a voicemail on main line inquiring about a refill for his simvastatin 20 MG Oral Tab.  He said he called the pharmacy and about a week ago was told it needed approval. flomax flomax flomax flomax

## 2023-07-17 ENCOUNTER — OFFICE VISIT (OUTPATIENT)
Dept: INTERNAL MEDICINE CLINIC | Facility: CLINIC | Age: 73
End: 2023-07-17

## 2023-07-17 VITALS
OXYGEN SATURATION: 96 % | HEIGHT: 68 IN | BODY MASS INDEX: 32.89 KG/M2 | DIASTOLIC BLOOD PRESSURE: 64 MMHG | RESPIRATION RATE: 14 BRPM | WEIGHT: 217 LBS | SYSTOLIC BLOOD PRESSURE: 108 MMHG | HEART RATE: 102 BPM

## 2023-07-17 DIAGNOSIS — J43.8 OTHER EMPHYSEMA (HCC): ICD-10-CM

## 2023-07-17 DIAGNOSIS — I10 ESSENTIAL HYPERTENSION WITH GOAL BLOOD PRESSURE LESS THAN 140/90: ICD-10-CM

## 2023-07-17 DIAGNOSIS — E78.2 MIXED HYPERLIPIDEMIA: ICD-10-CM

## 2023-07-17 DIAGNOSIS — Z01.818 PREOPERATIVE CLEARANCE: ICD-10-CM

## 2023-07-17 DIAGNOSIS — M54.16 LUMBAR RADICULOPATHY: Primary | ICD-10-CM

## 2023-07-17 NOTE — PATIENT INSTRUCTIONS
5. Preoperative clearance  Patient has no major clinical predictors going for Procedure: Posterior spinal decompression (laminectomy) at L2/3, L3/4, L4/5 with posterolateral lumbar fusions at L3/4, L4/5 surgery. Patient has reasonable functional capacity as described above. I have ordered blood works, EKG, chest x-ray as requested by surgeon. If they are acceptable, I will consider patient as low to moderate risk for perioperative cardiac events with routine perioperative care. DVT prophylaxis as protocol  The above risk assessment was discussed with the patient and would like to proceed with the surgery. Patient will discuss the risk and benefit of the surgery with the surgeon. The above note will be faxed to surgeon's office. Surgeons name Dr. La Waldrop  Location of surgery [facility] with [address] [place] [address]  Date of surgery August 10, 2023  Fax number to surgeon's office [phone]    - CBC, PLATELET; NO DIFFERENTIAL; Future  - COMP METABOLIC PANEL (14); Future  - EKG 12-LEAD; Future  - XR CHEST PA + LAT CHEST (CPT=71046); Future  - URINE CULTURE, ROUTINE; Future  - UA MICROSCOPIC ONLY, URINE;  Future  - PROTHROMBIN TIME (PT); Future    Plan: Risk stratification as above

## 2023-07-19 ENCOUNTER — HOSPITAL ENCOUNTER (OUTPATIENT)
Dept: GENERAL RADIOLOGY | Facility: HOSPITAL | Age: 73
Discharge: HOME | End: 2023-07-19
Attending: INTERNAL MEDICINE
Payer: MEDICARE

## 2023-07-19 ENCOUNTER — LAB ENCOUNTER (OUTPATIENT)
Dept: LAB | Facility: HOSPITAL | Age: 73
End: 2023-07-19
Attending: INTERNAL MEDICINE
Payer: MEDICARE

## 2023-07-19 DIAGNOSIS — Z01.818 PREOPERATIVE CLEARANCE: ICD-10-CM

## 2023-07-19 LAB
ALBUMIN SERPL-MCNC: 3.2 G/DL (ref 3.4–5)
ALBUMIN/GLOB SERPL: 0.9 {RATIO} (ref 1–2)
ALP LIVER SERPL-CCNC: 56 U/L
ALT SERPL-CCNC: 29 U/L
ANION GAP SERPL CALC-SCNC: 7 MMOL/L (ref 0–18)
AST SERPL-CCNC: 19 U/L (ref 15–37)
ATRIAL RATE: 95 BPM
BILIRUB SERPL-MCNC: 0.6 MG/DL (ref 0.1–2)
BUN BLD-MCNC: 15 MG/DL (ref 7–18)
BUN/CREAT SERPL: 16.5 (ref 10–20)
CALCIUM BLD-MCNC: 8.3 MG/DL (ref 8.5–10.1)
CHLORIDE SERPL-SCNC: 108 MMOL/L (ref 98–112)
CO2 SERPL-SCNC: 27 MMOL/L (ref 21–32)
CREAT BLD-MCNC: 0.91 MG/DL
DEPRECATED RDW RBC AUTO: 48 FL (ref 35.1–46.3)
ERYTHROCYTE [DISTWIDTH] IN BLOOD BY AUTOMATED COUNT: 13.1 % (ref 11–15)
FASTING STATUS PATIENT QL REPORTED: NO
GFR SERPLBLD BASED ON 1.73 SQ M-ARVRAT: 88 ML/MIN/1.73M2 (ref 60–?)
GLOBULIN PLAS-MCNC: 3.6 G/DL (ref 2.8–4.4)
GLUCOSE BLD-MCNC: 119 MG/DL (ref 70–99)
HCT VFR BLD AUTO: 45.7 %
HGB BLD-MCNC: 15.1 G/DL
INR BLD: 1.03 (ref 0.85–1.16)
MCH RBC QN AUTO: 33 PG (ref 26–34)
MCHC RBC AUTO-ENTMCNC: 33 G/DL (ref 31–37)
MCV RBC AUTO: 99.8 FL
OSMOLALITY SERPL CALC.SUM OF ELEC: 296 MOSM/KG (ref 275–295)
P AXIS: 83 DEGREES
P-R INTERVAL: 164 MS
PLATELET # BLD AUTO: 213 10(3)UL (ref 150–450)
POTASSIUM SERPL-SCNC: 4 MMOL/L (ref 3.5–5.1)
PROT SERPL-MCNC: 6.8 G/DL (ref 6.4–8.2)
PROTHROMBIN TIME: 13.4 SECONDS (ref 11.6–14.8)
Q-T INTERVAL: 350 MS
QRS DURATION: 72 MS
QTC CALCULATION (BEZET): 439 MS
R AXIS: 46 DEGREES
RBC # BLD AUTO: 4.58 X10(6)UL
SODIUM SERPL-SCNC: 142 MMOL/L (ref 136–145)
T AXIS: 72 DEGREES
VENTRICULAR RATE: 95 BPM
WBC # BLD AUTO: 8.8 X10(3) UL (ref 4–11)

## 2023-07-19 PROCEDURE — 93010 ELECTROCARDIOGRAM REPORT: CPT | Performed by: INTERNAL MEDICINE

## 2023-07-19 PROCEDURE — 85610 PROTHROMBIN TIME: CPT

## 2023-07-19 PROCEDURE — 81015 MICROSCOPIC EXAM OF URINE: CPT

## 2023-07-19 PROCEDURE — 85027 COMPLETE CBC AUTOMATED: CPT

## 2023-07-19 PROCEDURE — 87086 URINE CULTURE/COLONY COUNT: CPT

## 2023-07-19 PROCEDURE — 71046 X-RAY EXAM CHEST 2 VIEWS: CPT | Performed by: INTERNAL MEDICINE

## 2023-07-19 PROCEDURE — 36415 COLL VENOUS BLD VENIPUNCTURE: CPT

## 2023-07-19 PROCEDURE — 93005 ELECTROCARDIOGRAM TRACING: CPT

## 2023-07-19 PROCEDURE — 80053 COMPREHEN METABOLIC PANEL: CPT

## 2023-07-31 ENCOUNTER — TELEPHONE (OUTPATIENT)
Dept: INTERNAL MEDICINE CLINIC | Facility: CLINIC | Age: 73
End: 2023-07-31

## 2023-07-31 DIAGNOSIS — Z01.818 PRE-OPERATIVE CLEARANCE: Primary | ICD-10-CM

## 2023-07-31 NOTE — TELEPHONE ENCOUNTER
Patient called requesting a test for mrsa for patient stated is not presenting symptoms but is required for his surgery on 08/10 requesting a call back

## 2023-08-02 ENCOUNTER — LAB ENCOUNTER (OUTPATIENT)
Dept: LAB | Facility: HOSPITAL | Age: 73
End: 2023-08-02
Attending: INTERNAL MEDICINE
Payer: MEDICARE

## 2023-08-02 DIAGNOSIS — Z01.818 PRE-OPERATIVE CLEARANCE: ICD-10-CM

## 2023-08-02 PROCEDURE — 87081 CULTURE SCREEN ONLY: CPT

## 2023-08-18 ENCOUNTER — PATIENT OUTREACH (OUTPATIENT)
Dept: CASE MANAGEMENT | Age: 73
End: 2023-08-18

## 2023-08-18 DIAGNOSIS — R73.03 PREDIABETES: Primary | ICD-10-CM

## 2023-08-18 DIAGNOSIS — M43.10 DEGENERATIVE SPONDYLOLISTHESIS: ICD-10-CM

## 2023-08-18 DIAGNOSIS — E78.2 MIXED HYPERLIPIDEMIA: ICD-10-CM

## 2023-08-18 DIAGNOSIS — E66.09 CLASS 1 OBESITY DUE TO EXCESS CALORIES WITHOUT SERIOUS COMORBIDITY WITH BODY MASS INDEX (BMI) OF 31.0 TO 31.9 IN ADULT: ICD-10-CM

## 2023-08-18 PROBLEM — M48.061 SPINAL STENOSIS OF LUMBAR REGION WITH RADICULOPATHY: Status: ACTIVE | Noted: 2021-10-04

## 2023-08-18 PROBLEM — G89.29 CHRONIC BILATERAL LOW BACK PAIN WITH LEFT-SIDED SCIATICA: Status: ACTIVE | Noted: 2023-05-22

## 2023-08-18 PROBLEM — M54.16 SPINAL STENOSIS OF LUMBAR REGION WITH RADICULOPATHY: Status: ACTIVE | Noted: 2021-10-04

## 2023-08-18 PROBLEM — M54.42 CHRONIC BILATERAL LOW BACK PAIN WITH LEFT-SIDED SCIATICA: Status: ACTIVE | Noted: 2023-05-22

## 2023-08-18 RX ORDER — DOCUSATE SODIUM 50 MG AND SENNOSIDES 8.6 MG 8.6; 5 MG/1; MG/1
1 TABLET, FILM COATED ORAL 2 TIMES DAILY PRN
COMMUNITY

## 2023-08-18 RX ORDER — ACETAMINOPHEN 500 MG
TABLET ORAL
COMMUNITY
Start: 2023-08-12

## 2023-08-18 RX ORDER — TIZANIDINE 2 MG/1
2 TABLET ORAL
COMMUNITY
Start: 2023-08-12

## 2023-08-18 RX ORDER — HYDROCODONE BITARTRATE AND ACETAMINOPHEN 5; 325 MG/1; MG/1
TABLET ORAL
COMMUNITY
Start: 2023-08-12

## 2023-08-18 NOTE — H&P
Spoke to Giorgi moran for CCM. Updates to patient care team/comments: UTD    Patient reported changes in medications: Medications listed below reconciled onto active med list. Post lumbar surgery. HYDROCODONE/ACETAMINOPHEN 5-325 TB New  Add as: HYDROcodone-acetaminophen 5-325 MG Oral Tab  TAKE 1 TO 2 TABLETS BY MOUTH EVERY 4 HOURS AS NEEDED FOR MODERATE TO SEVERE PAIN. MAX DAILY AMOUNT IS 12 TABLETS. Acetaminophen (TYLENOL EXTRA STRENGTH) 500 MG PO Tab New  Add as: acetaminophen 500 MG Oral Tab Dose: 500-1,000 mg Take 1-2 Tabs by mouth every 6 hours as needed for Mild Pain (Pain Scale 1-3), For Headache or For Fever (for temp greater than 100.4F). Do not take with Norco as both contain acetominophen 08/12/2023  [address] [place] [address]       Updated on: 08/12/2023        STIMULANT 8.6-50MG TABLETS New  Add as: STIMULANT LAXATIVE 8.6-50 MG Oral Tab  TAKE 1 TABLET BY MOUTH TWICE DAILY AS NEEDED   1 Outside Source, External Pharmacy       Updated on: 08/13/2023    Dispense Date: 08/13/2023; Jason Arenas: 60 each     Med Adherence  Comment: taking as directed    Health Maintenance:   Zoster Vaccines(1 of 2) Never done Pt reports having it placed at [address] [facility] Never done  COVID-19 Vaccine([facility] series) due on 08/14/2022  MA Annual Health Assessment due on 01/01/2023 Scheduled   Influenza Vaccine(1) due on 10/01/2023  Colorectal Cancer Screening due on 07/27/2024  PSA due on 12/19/2024  Annual Depression Screening Completed  Fall Risk Screening (Annual) Completed  Pneumococcal Vaccine: 65+ Years Completed    Patient updates/concerns:    Underwent surgery for lower lumbar 3. 4. and 5 at [address] [place] [address] last week Thursday. He was discharged on Sunday afternoon. He was not discharged home with [facility]. Reports daughter in law is an RN. She provided wound care post surgery at home son who is a  provided wound care the second time. He reports area looks good. No signs of infection.  [facility] friends are taking turns visiting and helping around his home. He is walking 3,000-4,000 steps a day. States upon discharge he was advised to try to walk a mile a day. He takes his time walking up and down stairs. Reports feeling stir crazy at home. He is not sure if PT will follow. He is scheduled to see Dr. Jona Gordon in two weeks. Goals/Action Plan: Active goal from previous outreach:   Weight loss of 10 lbs by walking daily and eating healthy. Patient reported progress towards goals: Pt continues to walk almost daily. He is also trying not to over eat as many friends and family member are dropping off food while he recovers. - What: Walks            - When: late morning           - How: n/a           - How Often: daily            - Where: in his neighborhood  Patient Reported Barriers and Concerns: He is recovering lumbar surgery. - Plan for overcoming barriers: States he will continue to walk daily as tolerable he is hoping to walk longer and further as he recovers. Care Managers Interventions: Care everywhere updated    Praised patient on efforts he is making to walk daily as directed by surgeon. Future Appointments:   Future Appointments   Date Time Provider Leif Morris   10/16/2023  5:30 PM Christian Rosales MD [facility] [place]     Next Care Manager Follow Up Date: One month    Reason For Follow Up: review progress and or barriers towards patient's goals. Time Spent This Encounter Total:28  min medical record review, telephone communication, care plan updates where needed, education, goals, and action plan recreation/update. Provided acknowledgment and validation to patient's concerns.    Monthly Minute Total including today: 28  Physical assessment, complete health history, and need for CCM established by Abigail Walter MD.

## 2023-10-09 ENCOUNTER — PATIENT OUTREACH (OUTPATIENT)
Dept: CASE MANAGEMENT | Age: 73
End: 2023-10-09

## 2023-10-09 DIAGNOSIS — I10 ESSENTIAL HYPERTENSION WITH GOAL BLOOD PRESSURE LESS THAN 140/90: ICD-10-CM

## 2023-10-09 DIAGNOSIS — R73.03 PREDIABETES: ICD-10-CM

## 2023-10-09 DIAGNOSIS — M54.42 CHRONIC BILATERAL LOW BACK PAIN WITH LEFT-SIDED SCIATICA: ICD-10-CM

## 2023-10-09 DIAGNOSIS — E66.09 CLASS 1 OBESITY DUE TO EXCESS CALORIES WITHOUT SERIOUS COMORBIDITY WITH BODY MASS INDEX (BMI) OF 31.0 TO 31.9 IN ADULT: Primary | ICD-10-CM

## 2023-10-09 DIAGNOSIS — G89.29 CHRONIC BILATERAL LOW BACK PAIN WITH LEFT-SIDED SCIATICA: ICD-10-CM

## 2023-10-09 RX ORDER — PREGABALIN 75 MG/1
75 CAPSULE ORAL 2 TIMES DAILY
COMMUNITY
Start: 2023-09-27

## 2023-10-09 NOTE — H&P
Hospital Medicine History & Physical Note    Date of Service  1/18/2019    Primary Care Physician  HOLLAND Rueda    Consultants  neurology    Code Status  Full    Chief Complaint  Seizure    History of Presenting Illness  31 y.o. female who presented 1/18/2019 with past medical history of seizure disorder depression presents to the emergency room with a complaint of general malaise and seizures daily ongoing for the last 7 days.. Patient had an abnormal EEG done in 2008 .  Since then she has been on some antiseizure medication.  As per the  she is been having daily seizures , tonic-clonic lasting for a few minutes ever since she cut 1 of her medication dosages in half. Review of the office notes from her primary care physician appears that the clonazepam was apparently cut in half.  She also complaining of a headache starting at the back of her right side of her head radiating towards the front, throbbing intensity 3 out of 10,.    She denies any history of migraine    She denies any history of trauma    No fever chills      Nausea but no vomiting  Review of Systems  Review of Systems   Constitutional: Negative for chills and fever.   HENT: Negative for ear pain, hearing loss and tinnitus.    Respiratory: Negative.    Gastrointestinal: Positive for nausea. Negative for vomiting.   Musculoskeletal: Negative.    Neurological: Positive for seizures.   Psychiatric/Behavioral: Positive for depression.       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 Updates to patient care team/comments: Nelson Issa  Patient reported changes in medications: Pregabalin 50mg was increased by APRN at [facility] to 75 mg, Pt discontinues Norco on his own three weeks, was worried about becoming addicted. Med Adherence  Comment: as directed     Health Maintenance: Discussed updating preventative care. Pt states he wants to wait and speak with Dr. Nik Boone at [facility] visit first.   Zoster Vaccines(1 of 2) Never done  Lung Cancer Screening Never done  COVID-19 Vaccine(5 - Pfizer series) due on 08/14/2022  MA Annual Health Assessment due on 01/01/2023 Scheduled   Influenza Vaccine(1) due on 10/01/2023  Colorectal Cancer Screening due on 07/27/2024  PSA due on 12/19/2024  Annual Depression Screening Completed  Fall Risk Screening (Annual) Completed  Pneumococcal Vaccine: 65+ Years Completed    Patient updates/concerns:    Pt reports he is walking better, however still has pain and numbness on his outer left leg. He is trying to walk a mile a day. Averages 3/4 of a mile or approximately 3,000 steps a day. He is considering PT although is not feeling very optimistic. Goals/Action Plan: Active goal from previous outreach:   Weight loss of 10 lbs by walking daily and eating healthy. Patient reported progress towards goals: He continues to work towards meeting goal               - What:  Pt lost weight gained           - Where/When/How: Pt states last OV with Dr. Roxanne Noyola he weighted in at 213 lbs compared to 217 lbs the month prior. States after surgery he did not have much of an appetite, had increased pain for several days. That prevented him from over snacking and eating out. He is also walking daily 3/4 of a mile to a mile a day. Patient Reported Barriers and Concerns: Pain and left leg numbness                   - Plan for overcoming barriers: He will keep up coming appointment with pain clinic and Dr. Roxanne Noyola.  States he is considering PT neuropathy (HCC); EMPHYSEMA; GERD (gastroesophageal reflux disease); Glaucoma; Goiter; Headache(784.0); Heart attack (HCC); Heart murmur; HIV (human immunodeficiency virus infection); Hyperlipidemia; Hypertension; IBD (inflammatory bowel disease); Kidney disease; Meningitis; Migraine; Muscle disorder; OSTEOPOROSIS; Parathyroid disorder (HCC); Pituitary disease (HCC); Sickle cell disease (HCC); Stroke (HCC); Substance abuse (HCC); Thyroid disease; Tuberculosis; Ulcer; or Urinary tract infection, site not specified.    Surgical History  Denies surgery    Family History  family history includes Hypertension in her father.     Social History   reports that she has never smoked. She has never used smokeless tobacco. She reports that she does not drink alcohol or use drugs.    Allergies  No Known Allergies    Medications  Prior to Admission Medications   Prescriptions Last Dose Informant Patient Reported? Taking?   clonazePAM (KLONOPIN) 0.5 MG Tab 1/17/2019 at pm Patient No No   Sig: Take 0.5 Tabs by mouth 2 times a day as needed for up to 30 days.   escitalopram (LEXAPRO) 20 MG tablet 1/17/2019 at pm Patient No No   Sig: Take 1 Tab by mouth every day.   levetiracetam (KEPPRA) 750 MG tablet 1/17/2019 at pm Patient No No   Sig: Take 1 Tab by mouth 2 Times a Day.      Facility-Administered Medications: None       Physical Exam  Temp:  [36.1 °C (97 °F)-36.8 °C (98.2 °F)] 36.8 °C (98.2 °F)  Pulse:  [67-86] 67  Resp:  [16-24] 18  BP: (130-132)/(78-84) 132/78  SpO2:  [97 %-100 %] 97 %    Physical Exam   Constitutional: She is oriented to person, place, and time. No distress.   HENT:   Head: Normocephalic and atraumatic.   Mouth/Throat: No oropharyngeal exudate.   Eyes: Pupils are equal, round, and reactive to light. EOM are normal. Left eye exhibits no discharge. No scleral icterus.   Neck: No tracheal deviation present. No thyromegaly present.   Cardiovascular: Normal rate, regular rhythm and intact distal pulses.  Exam  to see if it would help decrease pain at a faster pace. Care Managers Interventions: Praised patient on efforts to stay active. Encouraged patient to coive concerns to pain specialist and  Dr. Katia Garcia. Discussed benefits of mind over body and positive self talk. As well as benefits of PT. Care everywhere updated    Future Appointments:   Future Appointments   Date Time Provider Leif Alexandra   10/16/2023  5:30 PM Erasmo Pete MD [facility]     Next Care Manager Follow Up Date: One month    Reason For Follow Up: review progress and or barriers towards patient's goals. Time Spent This Encounter Total: 27 min medical record review, telephone communication, care plan updates where needed, education, goals, and action plan recreation/update. Provided acknowledgment and validation to patient's concerns.    Monthly Minute Total including today: 27  Physical assessment, complete health history, and need for CCM established by Maria Isabel Lewis MD. reveals no gallop and no friction rub.    No murmur heard.  Pulmonary/Chest: No respiratory distress. She has no wheezes.   Abdominal: She exhibits no distension. There is no tenderness. There is no rebound.   Musculoskeletal: Normal range of motion. She exhibits no edema, tenderness or deformity.   Neurological: She is alert and oriented to person, place, and time. No cranial nerve deficit. Coordination normal.   Skin: No rash noted. She is not diaphoretic. No erythema. No pallor.   Psychiatric:   Depressed mood       Laboratory:  Recent Labs      01/18/19   0643   WBC  4.5*   RBC  4.59   HEMOGLOBIN  13.8   HEMATOCRIT  39.7   MCV  86.5   MCH  30.1   MCHC  34.8   RDW  39.7   PLATELETCT  136*   MPV  10.0     Recent Labs      01/18/19   0643   SODIUM  141   POTASSIUM  3.7   CHLORIDE  107   CO2  26   GLUCOSE  108*   BUN  13   CREATININE  0.64   CALCIUM  9.0     Recent Labs      01/18/19   0643   ALTSGPT  13   ASTSGOT  15   ALKPHOSPHAT  51   TBILIRUBIN  0.5   GLUCOSE  108*                 No results for input(s): TROPONINI in the last 72 hours.    Urinalysis:    No results found     Imaging:  CT-HEAD W/O    (Results Pending)   MR-BRAIN-WITH & W/O    (Results Pending)         Assessment/Plan:  I anticipate this patient is appropriate for observation status at this time.    Seizure disorder (HCC)- (present on admission)   Assessment & Plan    Check EEG    Check MRI of the brain  Neurology consult    Discussed with Dr. Graves     Depression, major, recurrent, moderate (HCC)- (present on admission)   Assessment & Plan    Continue Lexapro     Other headache syndrome- (present on admission)   Assessment & Plan    Likely related to her seizure    Control headache with Tylenol as needed         VTE prophylaxis: scd

## 2023-10-16 ENCOUNTER — OFFICE VISIT (OUTPATIENT)
Dept: INTERNAL MEDICINE CLINIC | Facility: CLINIC | Age: 73
End: 2023-10-16
Payer: COMMERCIAL

## 2023-10-16 VITALS
HEIGHT: 68 IN | RESPIRATION RATE: 14 BRPM | BODY MASS INDEX: 30.77 KG/M2 | OXYGEN SATURATION: 95 % | SYSTOLIC BLOOD PRESSURE: 110 MMHG | DIASTOLIC BLOOD PRESSURE: 70 MMHG | WEIGHT: 203 LBS | HEART RATE: 102 BPM

## 2023-10-16 DIAGNOSIS — M54.42 CHRONIC BILATERAL LOW BACK PAIN WITH LEFT-SIDED SCIATICA: ICD-10-CM

## 2023-10-16 DIAGNOSIS — G89.29 CHRONIC BILATERAL LOW BACK PAIN WITH LEFT-SIDED SCIATICA: ICD-10-CM

## 2023-10-16 DIAGNOSIS — F17.210 CIGARETTE NICOTINE DEPENDENCE WITHOUT COMPLICATION: ICD-10-CM

## 2023-10-16 DIAGNOSIS — E78.2 MIXED HYPERLIPIDEMIA: ICD-10-CM

## 2023-10-16 DIAGNOSIS — M54.16 SPINAL STENOSIS OF LUMBAR REGION WITH RADICULOPATHY: ICD-10-CM

## 2023-10-16 DIAGNOSIS — H26.499 POSTERIOR CAPSULAR OPACIFICATION, UNSPECIFIED LATERALITY: ICD-10-CM

## 2023-10-16 DIAGNOSIS — K64.8 INTERNAL HEMORRHOIDS: ICD-10-CM

## 2023-10-16 DIAGNOSIS — L71.9 ROSACEA: ICD-10-CM

## 2023-10-16 DIAGNOSIS — M71.38 SYNOVIAL CYST OF LUMBAR SPINE: ICD-10-CM

## 2023-10-16 DIAGNOSIS — Z23 INFLUENZA VACCINE NEEDED: Primary | ICD-10-CM

## 2023-10-16 DIAGNOSIS — H61.23 BILATERAL IMPACTED CERUMEN: ICD-10-CM

## 2023-10-16 DIAGNOSIS — M19.019 OSTEOARTHRITIS OF SHOULDER, UNSPECIFIED LATERALITY, UNSPECIFIED OSTEOARTHRITIS TYPE: ICD-10-CM

## 2023-10-16 DIAGNOSIS — R73.03 PREDIABETES: ICD-10-CM

## 2023-10-16 DIAGNOSIS — I10 ESSENTIAL HYPERTENSION WITH GOAL BLOOD PRESSURE LESS THAN 140/90: ICD-10-CM

## 2023-10-16 DIAGNOSIS — M43.10 DEGENERATIVE SPONDYLOLISTHESIS: ICD-10-CM

## 2023-10-16 DIAGNOSIS — J43.1 PANLOBULAR EMPHYSEMA (HCC): ICD-10-CM

## 2023-10-16 DIAGNOSIS — I70.0 ATHEROSCLEROSIS OF AORTA (HCC): ICD-10-CM

## 2023-10-16 DIAGNOSIS — M48.061 SPINAL STENOSIS OF LUMBAR REGION WITH RADICULOPATHY: ICD-10-CM

## 2023-11-03 ENCOUNTER — ORDER TRANSCRIPTION (OUTPATIENT)
Dept: PHYSICAL THERAPY | Facility: HOSPITAL | Age: 73
End: 2023-11-03

## 2023-11-03 DIAGNOSIS — Z98.1 S/P LUMBAR SPINAL FUSION: Primary | ICD-10-CM

## 2023-11-06 ENCOUNTER — TELEPHONE (OUTPATIENT)
Dept: PHYSICAL THERAPY | Facility: HOSPITAL | Age: 73
End: 2023-11-06

## 2023-11-07 ENCOUNTER — OFFICE VISIT (OUTPATIENT)
Dept: PHYSICAL THERAPY | Age: 73
End: 2023-11-07
Attending: ORTHOPAEDIC SURGERY
Payer: MEDICARE

## 2023-11-07 DIAGNOSIS — Z98.1 S/P LUMBAR SPINAL FUSION: Primary | ICD-10-CM

## 2023-11-07 PROCEDURE — 97162 PT EVAL MOD COMPLEX 30 MIN: CPT

## 2023-11-07 PROCEDURE — 97112 NEUROMUSCULAR REEDUCATION: CPT

## 2023-11-07 PROCEDURE — 97110 THERAPEUTIC EXERCISES: CPT

## 2023-11-08 NOTE — PROGRESS NOTES
Dx:  S/P lumbar spinal fusion (Z98.1)                Insurance   [facility]         Authorized  # visits by insurance  :  10   Expiration date  of Authorization:2/5/24  Initial POC# of visits: 10         POC cert : 6/2/32  Eval date/latest PN:11/7/23    Authorizing Physician: Dr. Ted Eaton  Next MD visit: TBD  Fall Risk: standard         Precautions: no noted surgical precaution          Subjective: pt states he was okay after last session and states that he has been trying to the exercises. Pt states that he has been able to do the HEP and has  been able to lay down supine in his bed  PAIN LEVEL:2/10  Assessment:   Pt still has issues with laying despite pillow and wedge so worked on standing exercises and worked on paraspinals gentle MFR      Objective: no update        Goals:   goals addressed this day as noted above  Goals: to be met in 10 visits then will reassess      Pt will be I with HEP,its progression and management of symptoms and be I with self correction of upright posturing to continue with gains in therapy. Pt will demonstrate improved AROM in all planes of lumbar motion to 25%, pain and symptoms free to be able do car transfers with ease   Pt will improve strength on BLE graded below 5/5 to at least half a grade or better for ease of walking/standing and stairs management.    Pt will report overall decreased in pain and symptoms and functional limitations  by 50% or better to be able to PLOF  Pt will increase SLS on BLE for improved static/dynamic balance at least 5-10 secs better than initial findings for ease of load transfers with daily tasks on affected LE  pt will demonstrate improved hamstring flexibility on BLE to min loss to improve ADLS and neural mobility  Pt will improve transversus abdominis recruitment to perform proper isometric contraction without requiring verbal or tactile cuing to promote advancement of therex   Pt will demonstrate good understanding of proper posture and body mechanics to decrease pain and improve spinal safety      Pt will have decreased mm tension/hypertonicity on Bparalumbars  to minimal to none in order to lay supine >20 minutes for activities/HEP  Plan: cont per POC     Date: 11/8/2023  TX#: 2/12 Date:               TX#: 3/ Date:              TX#: 4/ Date:               TX#: 5/   Date:    Tx#: 6/   Theraex: NU step level 5 x 7 mins  Standing TRA with marches 2x10  Standing hip abd 2x10  Standing hip extension 2x10   Standing heel raises 2x10    LAQ with DF x10 each seated         Manual: Seated MFR/STM on B paraspinals parallel to incision)       Gait/NMRed: DB in supine and seated DB  TRA facilitation x2 reps  Slouch overcorrect 2x10       Modalities         HEP GIVEN: written copy given unless otherwise indicated       Charges: neuro donal x1 (10 mins0, theraex x1 (10 mins),man mob x1 (20 miins       Total Timed Treatment: 40 min  Total Treatment Time: 40 min

## 2023-11-09 ENCOUNTER — OFFICE VISIT (OUTPATIENT)
Dept: PHYSICAL THERAPY | Age: 73
End: 2023-11-09
Attending: ORTHOPAEDIC SURGERY
Payer: MEDICARE

## 2023-11-09 PROCEDURE — 97112 NEUROMUSCULAR REEDUCATION: CPT

## 2023-11-09 PROCEDURE — 97110 THERAPEUTIC EXERCISES: CPT

## 2023-11-09 PROCEDURE — 97140 MANUAL THERAPY 1/> REGIONS: CPT

## 2023-11-10 NOTE — PROGRESS NOTES
Dx:  S/P lumbar spinal fusion (Z98.1)                Insurance   [facility]         Authorized  # visits by insurance  :  10   Expiration date  of Authorization:2/5/24  Initial POC# of visits: 10         POC cert : 0/3/13  Eval date/latest PN:11/7/23    Authorizing Physician: Dr. Alec Thibodeaux  Next MD visit: TBD  Fall Risk: standard         Precautions: no noted surgical precaution          Subjective: Pt states that overall he does not feels much improvement. Pt states that he has no pain on the lower leg, he could lay down more on his back at home. Pt states that pain gets better as he walks    PAIN LEVEL:2/10  Assessment:   Pt still has issues with laying   down so modified clinic exercises and worked more on proprioception of standing more upright. He still has pain on low back when upright so trial of IFC to see how he will fare with pain  Objective: no update        Goals:   goals addressed this day as noted above  Goals: to be met in 10 visits then will reassess      Pt will be I with HEP,its progression and management of symptoms and be I with self correction of upright posturing to continue with gains in therapy. Pt will demonstrate improved AROM in all planes of lumbar motion to 25%, pain and symptoms free to be able do car transfers with ease   Pt will improve strength on BLE graded below 5/5 to at least half a grade or better for ease of walking/standing and stairs management.    Pt will report overall decreased in pain and symptoms and functional limitations  by 50% or better to be able to PLOF  Pt will increase SLS on BLE for improved static/dynamic balance at least 5-10 secs better than initial findings for ease of load transfers with daily tasks on affected LE  pt will demonstrate improved hamstring flexibility on BLE to min loss to improve ADLS and neural mobility  Pt will improve transversus abdominis recruitment to perform proper isometric contraction without requiring verbal or tactile cuing to promote advancement of therex   Pt will demonstrate good understanding of proper posture and body mechanics to decrease pain and improve spinal safety      Pt will have decreased mm tension/hypertonicity on Bparalumbars  to minimal to none in order to lay supine >20 minutes for activities/HEP  Plan: cont per POC     Date: 11/8/2023  TX#: 2/12 Date:      11/14/23         TX#: 3/12 Date:              TX#: 4/ Date:               TX#: 5/   Date:    Tx#: 6/   Theraex: NU step level 5 x 7 mins  Standing TRA with marches 2x10  Standing hip abd 2x10  Standing hip extension 2x10   Standing heel raises 2x10    LAQ with DF x10 each seated   NU step level 5 x 7 mins    Standing hip  exercises on foam 2x10 x 3 planes  LAQ with DF x10 each       Manual: Seated MFR/STM on B paraspinals parallel to incision)       Gait/NMRed: DB in supine and seated DB  TRA facilitation x2 reps  Slouch overcorrect 2x10 Standing ball roll to encourage spine extension   Standing narrow rows/extension 2x10 RTB\  Pizza carry 2x10      Modalities  IFC x15 mins x1-150 Hz       HEP GIVEN: written copy given unless otherwise indicated   11/14/23: standing hip , scapula retractions,pizza carry      Charges: neuro donal x1 (52euic4, theraex x1 (20 mins) , IFC x1 -150Hz  Total Timed Treatment: 30 min  Total Treatment Time: 45 min

## 2023-11-14 ENCOUNTER — OFFICE VISIT (OUTPATIENT)
Dept: PHYSICAL THERAPY | Age: 73
End: 2023-11-14
Attending: ORTHOPAEDIC SURGERY
Payer: MEDICARE

## 2023-11-14 PROCEDURE — 97110 THERAPEUTIC EXERCISES: CPT

## 2023-11-14 PROCEDURE — 97112 NEUROMUSCULAR REEDUCATION: CPT

## 2023-11-14 PROCEDURE — 97014 ELECTRIC STIMULATION THERAPY: CPT

## 2023-11-15 ENCOUNTER — APPOINTMENT (OUTPATIENT)
Dept: PHYSICAL THERAPY | Age: 73
End: 2023-11-15
Attending: ORTHOPAEDIC SURGERY
Payer: MEDICARE

## 2023-11-15 NOTE — PROGRESS NOTES
Dx:  S/P lumbar spinal fusion (Z98.1)                Insurance   [facility]         Authorized  # visits by insurance  :  10   Expiration date  of Authorization:2/5/24  Initial POC# of visits: 10         POC cert : 8/2/56  Eval date/latest PN:11/7/23    Authorizing Physician: Dr. Kamila Boyer MD visit: TBD  Fall Risk: standard         Precautions: no noted surgical precaution          Subjective: Pt states that he still feels no improvement. IFC did not change anything and massage felt better    PAIN LEVEL:2/10  Assessment:  .    Tenderness and hypertonicity noted on initial findings on affected area   worked /continued to be worked on this day      PT continued with challenging pt in performing exercises in  different positions / amount of resistance applied/ within new ROM gains  in order to work towards goals   Pt was able to manage shuttle and laying down for exercises         See exercises as logged below    Objective: no update        Goals:   goals addressed this day as noted above  Goals: to be met in 10 visits then will reassess      Pt will be I with HEP,its progression and management of symptoms and be I with self correction of upright posturing to continue with gains in therapy. Pt will demonstrate improved AROM in all planes of lumbar motion to 25%, pain and symptoms free to be able do car transfers with ease   Pt will improve strength on BLE graded below 5/5 to at least half a grade or better for ease of walking/standing and stairs management.    Pt will report overall decreased in pain and symptoms and functional limitations  by 50% or better to be able to PLOF  Pt will increase SLS on BLE for improved static/dynamic balance at least 5-10 secs better than initial findings for ease of load transfers with daily tasks on affected LE  pt will demonstrate improved hamstring flexibility on BLE to min loss to improve ADLS and neural mobility  Pt will improve transversus abdominis recruitment to perform proper isometric contraction without requiring verbal or tactile cuing to promote advancement of therex   Pt will demonstrate good understanding of proper posture and body mechanics to decrease pain and improve spinal safety      Pt will have decreased mm tension/hypertonicity on Bparalumbars  to minimal to none in order to lay supine >20 minutes for activities/HEP  Plan: cont per POC     Date: 11/8/2023  TX#: 2/12 Date:      11/14/23         TX#: 3/12 Date:     11/16/23         TX#: 4/12 Date:               TX#: 5/   Date:    Tx#: 6/   Theraex: NU step level 5 x 7 mins  Standing TRA with marches 2x10  Standing hip abd 2x10  Standing hip extension 2x10   Standing heel raises 2x10    LAQ with DF x10 each seated   NU step level 5 x 7 mins    Standing hip  exercises on foam 2x10 x 3 planes    LAQ with DF x10 each  NU step level 5 x 7 mins    Standing hip  exercises on foam 2x10 x 3 planes  LAQ with DF x10 each    Standing heel raises 2x10    Seated pizza carry 2  Shuttle with BLE 5 bands 2x10 then single leg x2x10 with 3 bands   Pillow and towel for head     Manual: Seated MFR/STM on B paraspinals parallel to incision)  Seated MFR/STM on B paraspinals parallel to incision)     Gait/NMRed: DB in supine and seated DB  TRA facilitation x2 reps  Slouch overcorrect 2x10 Standing ball roll to encourage spine extension   Standing narrow rows/extension 2x10 RTB\  Pizza carry 2x10      Modalities  IFC x15 mins x1-150 Hz       HEP GIVEN: written copy given unless otherwise indicated   11/14/23: standing hip , scapula retractions,pizza carry      Charges:, theraex x2 (30 mins) , man  mob x1 (10 mins)  Total Timed Treatment: 40 min  Total Treatment Time: 40 min

## 2023-11-16 ENCOUNTER — OFFICE VISIT (OUTPATIENT)
Dept: PHYSICAL THERAPY | Age: 73
End: 2023-11-16
Attending: ORTHOPAEDIC SURGERY
Payer: MEDICARE

## 2023-11-16 PROCEDURE — 97110 THERAPEUTIC EXERCISES: CPT

## 2023-11-16 PROCEDURE — 97140 MANUAL THERAPY 1/> REGIONS: CPT

## 2023-11-21 NOTE — PROGRESS NOTES
Dx:  S/P lumbar spinal fusion (Z98.1)                Insurance   [facility]         Authorized  # visits by insurance  :  10   Expiration date  of Authorization:2/5/24  Initial POC# of visits: 10         POC cert : 0/1/14  Eval date/latest PN:11/7/23    Authorizing Physician: Dr. Swapna Alamo  Next MD visit: TBD  Fall Risk: standard         Precautions: no noted surgical precaution          Subjective: Pt states that he still has issues with lifting leg back. Pt states that he continues to be able to sleep well in his bed. He feels like the low back is being worked well    PAIN LEVEL:2/10  Assessment:  .progressed to YTB resisted exercises    Tenderness and hypertonicity noted on initial findings on affected area   worked /continued to be worked on this day      PT continued with challenging pt in performing exercises in  different positions / amount of resistance applied       See exercises as logged below    Objective: no update        Goals:   goals addressed this day as noted above  Goals: to be met in 10 visits then will reassess      Pt will be I with HEP,its progression and management of symptoms and be I with self correction of upright posturing to continue with gains in therapy. Pt will demonstrate improved AROM in all planes of lumbar motion to 25%, pain and symptoms free to be able do car transfers with ease   Pt will improve strength on BLE graded below 5/5 to at least half a grade or better for ease of walking/standing and stairs management.    Pt will report overall decreased in pain and symptoms and functional limitations  by 50% or better to be able to PLOF  Pt will increase SLS on BLE for improved static/dynamic balance at least 5-10 secs better than initial findings for ease of load transfers with daily tasks on affected LE  pt will demonstrate improved hamstring flexibility on BLE to min loss to improve ADLS and neural mobility  Pt will improve transversus abdominis recruitment to perform proper isometric contraction without requiring verbal or tactile cuing to promote advancement of therex   Pt will demonstrate good understanding of proper posture and body mechanics to decrease pain and improve spinal safety      Pt will have decreased mm tension/hypertonicity on Bparalumbars  to minimal to none in order to lay supine >20 minutes for activities/HEP  Plan: cont per POC     Date: 11/8/2023  TX#: 2/12 Date:      11/14/23         TX#: 3/12 Date:     11/16/23         TX#: 4/12 Date:    11/22/23           TX#: 5/12   Date:    Tx#: 6/   Theraex: NU step level 5 x 7 mins  Standing TRA with marches 2x10  Standing hip abd 2x10  Standing hip extension 2x10   Standing heel raises 2x10    LAQ with DF x10 each seated   NU step level 5 x 7 mins    Standing hip  exercises on foam 2x10 x 3 planes    LAQ with DF x10 each  NU step level 5 x 7 mins    Standing hip  exercises on foam 2x10 x 3 planes  LAQ with DF x10 each    Standing heel raises 2x10    Seated pizza carry 2  Shuttle with BLE 5 bands 2x10 then single leg x2x10 with 3 bands   Pillow and towel for head NU step level 5 x 7 mins    Standing hip  exercises on foam 2x10 x 3 planes    Shuttle with BLE 5 bands 2x10 then single leg x2x10 with 3 bands   Pillow and towel for head    Manual: Seated MFR/STM on B paraspinals parallel to incision)  Seated MFR/STM on B paraspinals parallel to incision) Seated/SL   MFR/STM on B paraspinals parallel to incision)    Gait/NMRed: DB in supine and seated DB  TRA facilitation x2 reps  Slouch overcorrect 2x10 Standing ball roll to encourage spine extension   Standing narrow rows/extension 2x10 RTB\  Pizza carry 2x10      Modalities  IFC x15 mins x1-150 Hz       HEP GIVEN: written copy given unless otherwise indicated   11/14/23: standing hip , scapula retractions,pizza carry      Charges:, theraex x2 (30 mins) , man  mob x1 (10 mins)  Total Timed Treatment: 40 min  Total Treatment Time: 40 min

## 2023-11-22 ENCOUNTER — OFFICE VISIT (OUTPATIENT)
Dept: PHYSICAL THERAPY | Age: 73
End: 2023-11-22
Attending: ORTHOPAEDIC SURGERY
Payer: MEDICARE

## 2023-11-22 PROCEDURE — 97140 MANUAL THERAPY 1/> REGIONS: CPT

## 2023-11-22 PROCEDURE — 97110 THERAPEUTIC EXERCISES: CPT

## 2023-11-29 ENCOUNTER — OFFICE VISIT (OUTPATIENT)
Dept: PHYSICAL THERAPY | Age: 73
End: 2023-11-29
Attending: ORTHOPAEDIC SURGERY
Payer: MEDICARE

## 2023-11-29 PROCEDURE — 97140 MANUAL THERAPY 1/> REGIONS: CPT

## 2023-11-29 PROCEDURE — 97110 THERAPEUTIC EXERCISES: CPT

## 2023-11-29 NOTE — PROGRESS NOTES
Dx:  S/P lumbar spinal fusion (Z98.1)                Insurance   [facility]         Authorized  # visits by insurance  :  10   Expiration date  of Authorization:2/5/24  Initial POC# of visits: 10         POC cert : 5/7/76  Eval date/latest PN:11/7/23    Authorizing Physician: Dr. Alec Thibodeaux  Next MD visit: TBD  Fall Risk: standard         Precautions: no noted surgical precaution          Subjective: pt states that he is 20# better, he is walking more and feels like the exercises is helpin  PAIN LEVEL:2/10  Assessment:    Tenderness and hypertonicity noted on initial findings on affected area   worked /continued to be worked on this day      PT continued with challenging pt in performing exercises in  different positions / amount of resistance applied. Worked on dynamic hip exercises with YTB and more upright posture       See exercises as logged below    Objective: no update        Goals:   goals addressed this day as noted above  Goals: to be met in 10 visits then will reassess      Pt will be I with HEP,its progression and management of symptoms and be I with self correction of upright posturing to continue with gains in therapy. Pt will demonstrate improved AROM in all planes of lumbar motion to 25%, pain and symptoms free to be able do car transfers with ease   Pt will improve strength on BLE graded below 5/5 to at least half a grade or better for ease of walking/standing and stairs management.    Pt will report overall decreased in pain and symptoms and functional limitations  by 50% or better to be able to PLOF  Pt will increase SLS on BLE for improved static/dynamic balance at least 5-10 secs better than initial findings for ease of load transfers with daily tasks on affected LE  pt will demonstrate improved hamstring flexibility on BLE to min loss to improve ADLS and neural mobility  Pt will improve transversus abdominis recruitment to perform proper isometric contraction without requiring verbal or tactile cuing to promote advancement of therex   Pt will demonstrate good understanding of proper posture and body mechanics to decrease pain and improve spinal safety      Pt will have decreased mm tension/hypertonicity on Bparalumbars  to minimal to none in order to lay supine >20 minutes for activities/HEP  Plan: cont per POC     Date: 11/8/2023  TX#: 2/12 Date:      11/14/23         TX#: 3/12 Date:     11/16/23         TX#: 4/12 Date:    11/22/23           TX#: 5/12   Date: 11/29/23  Tx#: 6/12   Theraex: NU step level 5 x 7 mins  Standing TRA with marches 2x10  Standing hip abd 2x10  Standing hip extension 2x10   Standing heel raises 2x10    LAQ with DF x10 each seated   NU step level 5 x 7 mins    Standing hip  exercises on foam 2x10 x 3 planes    LAQ with DF x10 each  NU step level 5 x 7 mins    Standing hip  exercises on foam 2x10 x 3 planes  LAQ with DF x10 each    Standing heel raises 2x10    Seated pizza carry 2  Shuttle with BLE 5 bands 2x10 then single leg x2x10 with 3 bands   Pillow and towel for head NU step level 5 x 7 mins    Standing hip  exercises on foam 2x10 x 3 planes    Shuttle with BLE 5 bands 2x10 then single leg x2x10 with 3 bands   Pillow and towel for head NU step level 5 x 7 mins    Shuttle with BLE 6 bands 2x10 then single leg x2x10 with 4 bands    Pillow and towel for head    Monster wals YTB x3 rounds  Then monster walks and retro walks    Scapula walks YTB for posture   Manual: Seated MFR/STM on B paraspinals parallel to incision)  Seated MFR/STM on B paraspinals parallel to incision) Seated/SL   MFR/STM on B paraspinals parallel to incision) Seated/SL   MF/STM with /STM on B paraspinals parallel to incision) utilized a crosse balls   Gait/NMRed: DB in supine and seated DB  TRA facilitation x2 reps  Slouch overcorrect 2x10 Standing ball roll to encourage spine extension   Standing narrow rows/extension 2x10 RTB\  Pizza carry 2x10      Modalities  IFC x15 mins x1-150 Hz       HEP GIVEN: written copy given unless otherwise indicated   11/14/23: standing hip , scapula retractions,pizza carry      Charges:, theraex x2 (30 mins) , man  mob x1 (10 mins)  Total Timed Treatment: 40 min  Total Treatment Time: 40 min

## 2023-11-30 ENCOUNTER — PATIENT OUTREACH (OUTPATIENT)
Dept: CASE MANAGEMENT | Age: 73
End: 2023-11-30

## 2023-11-30 DIAGNOSIS — M43.10 DEGENERATIVE SPONDYLOLISTHESIS: ICD-10-CM

## 2023-11-30 DIAGNOSIS — G89.29 CHRONIC BILATERAL LOW BACK PAIN WITH LEFT-SIDED SCIATICA: Primary | ICD-10-CM

## 2023-11-30 DIAGNOSIS — E66.09 CLASS 1 OBESITY DUE TO EXCESS CALORIES WITHOUT SERIOUS COMORBIDITY WITH BODY MASS INDEX (BMI) OF 31.0 TO 31.9 IN ADULT: ICD-10-CM

## 2023-11-30 DIAGNOSIS — I10 ESSENTIAL HYPERTENSION WITH GOAL BLOOD PRESSURE LESS THAN 140/90: ICD-10-CM

## 2023-11-30 DIAGNOSIS — M54.42 CHRONIC BILATERAL LOW BACK PAIN WITH LEFT-SIDED SCIATICA: Primary | ICD-10-CM

## 2023-11-30 DIAGNOSIS — R73.03 PREDIABETES: ICD-10-CM

## 2023-11-30 NOTE — PROGRESS NOTES
Spoke to Giorgi moran for CCM. Updates to patient care team/comments: None    Patient reported changes in medications: Pt does not report any changes to active meds  Med Adherence  Comment: Pt reports taking medications as directed    Health Maintenance:  Reviewed health maintenance with patient. Health Maintenance   Topic Date Due    Lung Cancer Screening  Never done    Zoster Vaccines (2 of 3) 11/30/2015 Pt thinks he got it at [place]    COVID-19 Vaccine (5 - 2023-24 season) 09/01/2023 Declined    Colorectal Cancer Screening  07/27/2024    PSA  12/19/2024    Influenza Vaccine  Completed    MA Annual Health Assessment  Completed    Annual Depression Screening  Completed    Fall Risk Screening (Annual)  Completed    Pneumococcal Vaccine: 65+ Years  Completed       Patient updates/concerns:    Back pain has improved some. Dr. Myke Mares recommended he undergo PT for 4-6 weeks 2-3 times a week. Insurance authorized him for 10 session, at that point a re evaluation will be done. He has completed five session and has noted some improvement. States the morning are very hard, his back locks up, it's very stiff. It takes him time to get going. He is not able to stand for extended periods of time. States he tolerates 3-4 minutes at a time. .    Pt reports he does not have much appetite. He is working on cutting portions and eating healthier. Goals/Action Plan: Active goal from previous outreach:   Weight loss of 10 lbs by walking daily and eating healthy. Patient reported progress towards goals: met ( New goal set)                - What: Pt is lost weight           - Where/When/How: Pt is down from 14 lbs, He is currently 203 lbs. States he cut back on eating [employer] and cut back on portions He also had good walking weather this past month is walking 2,500-4,000 steps daily. He has also started PT and is doing exercises on his own at home.    NEW GOAL: Complete PT and lose 5 lbs get to 198 lbs Where/when/how: By eating healthier, cutting portions and continuing PT exercises at home. Care Managers Interventions: Praised patient on efforts to start PT. Encouraged he continue with exercises on his own. Care every where updated    Immunization query completed. No updates available at this time. Provided support. Encouraged patient to call as needed. Future Appointments:   Future Appointments   Date Time Provider Leif Morris   12/5/2023 11:45 AM Claria Henderson, PT ADO PT [facility] [place]   12/7/2023  1:15 PM Claria Henderson, PT ADO PT [facility] [place]   12/12/2023 11:45 AM Claria Henderson, PT ADO PT [facility] [place]   12/14/2023  1:15 PM Claria Henderson, PT ADO PT [facility] [place]   12/19/2023 11:45 AM Claria Henderson, PT ADO PT [facility] [place]   12/21/2023 11:00 AM Claria Henderson, PT ADO PT [facility] [place]   4/15/2024  5:30 PM Matt Mello MD [facility]     Next Care Manager Follow Up Date: One month    Reason For Follow Up: review progress and or barriers towards patient's goals. Time Spent This Encounter Total: 28 min medical record review, telephone communication, care plan updates where needed, education, goals, and action plan recreation/update. Provided acknowledgment and validation to patient's concerns.    Monthly Minute Total including today: 28  Physical assessment, complete health history, and need for CCM established by Donis Craig MD.

## 2023-12-01 ENCOUNTER — APPOINTMENT (OUTPATIENT)
Dept: PHYSICAL THERAPY | Age: 73
End: 2023-12-01
Attending: ORTHOPAEDIC SURGERY
Payer: MEDICARE

## 2023-12-01 NOTE — PROGRESS NOTES
Dx:  S/P lumbar spinal fusion (Z98.1)                Insurance   [facility]         Authorized  # visits by insurance  :  10   Expiration date  of Authorization:2/5/24  Initial POC# of visits: 10         POC cert : 5/3/46  Eval date/latest PN:11/7/23    Authorizing Physician: Dr. Hilda Boyer MD visit: TBD  Fall Risk: standard         Precautions: no noted surgical precaution          Subjective: He still has pain mostly  in am. Walking and standing is better but bending is still hard  PAIN LEVEL:2/10  Assessment:    Tenderness and hypertonicity noted on initial findings on affected area   worked /continued to be worked on this day , used foam roll and lacrosse ball on PSIS/piriformis/gluteal area     Pt now able  to lie down in supine x1 min     See exercises as logged below   Objective: no update        Goals:   goals addressed this day as noted above  Goals: to be met in 10 visits then will reassess      Pt will be I with HEP,its progression and management of symptoms and be I with self correction of upright posturing to continue with gains in therapy. Pt will demonstrate improved AROM in all planes of lumbar motion to 25%, pain and symptoms free to be able do car transfers with ease   Pt will improve strength on BLE graded below 5/5 to at least half a grade or better for ease of walking/standing and stairs management.    Pt will report overall decreased in pain and symptoms and functional limitations  by 50% or better to be able to PLOF  Pt will increase SLS on BLE for improved static/dynamic balance at least 5-10 secs better than initial findings for ease of load transfers with daily tasks on affected LE  pt will demonstrate improved hamstring flexibility on BLE to min loss to improve ADLS and neural mobility  Pt will improve transversus abdominis recruitment to perform proper isometric contraction without requiring verbal or tactile cuing to promote advancement of therex   Pt will demonstrate good understanding of proper posture and body mechanics to decrease pain and improve spinal safety      Pt will have decreased mm tension/hypertonicity on Bparalumbars  to minimal to none in order to lay supine >20 minutes for activities/HEP  Plan: cont per POC     Date: 11/9/2023  TX#: 2/12 Date:      11/14/23         TX#: 3/12 Date:     11/16/23         TX#: 4/12 Date:    11/22/23           TX#: 5/12   Date: 11/29/23  Tx#: 6/12 Date: 12/5/23  Tx#:7 /12      Theraex: NU step level 5 x 7 mins  Standing TRA with marches 2x10  Standing hip abd 2x10  Standing hip extension 2x10   Standing heel raises 2x10    LAQ with DF x10 each seated   NU step level 5 x 7 mins    Standing hip  exercises on foam 2x10 x 3 planes    LAQ with DF x10 each  NU step level 5 x 7 mins    Standing hip  exercises on foam 2x10 x 3 planes  LAQ with DF x10 each    Standing heel raises 2x10    Seated pizza carry 2  Shuttle with BLE 5 bands 2x10 then single leg x2x10 with 3 bands   Pillow and towel for head NU step level 5 x 7 mins    Standing hip  exercises on foam 2x10 x 3 planes    Shuttle with BLE 5 bands 2x10 then single leg x2x10 with 3 bands   Pillow and towel for head NU step level 5 x 7 mins    Shuttle with BLE 6 bands 2x10 then single leg x2x10 with 4 bands    Pillow and towel for head    Monster wals YTB x3 rounds  Then monster walks and retro walks    Scapula walks YTB for posture NU step level 5 x 7 mins    Pillow and towel for head  Shuttle with BLE 6 bands 2x10 then single leg x2x10 with 4 bands    Monster wals YTB x3 rounds  Then monster walks and retro walks    Scapula walks YTB for posture    Seated swiss ball flexion 2x5   Manual: Seated MFR/STM on B paraspinals parallel to incision)  Seated MFR/STM on B paraspinals parallel to incision) Seated/SL   MFR/STM on B paraspinals parallel to incision) Seated/SL   MF/STM with /STM on B paraspinals parallel to incision) utilized a crosse balls Seated/SL   MF/STM with /STM on B paraspinals parallel to incision) utilized a crosse balls and foam roll on piriformis/PSIS and gluteal area   Gait/NMRed: DB in supine and seated DB  TRA facilitation x2 reps  Slouch overcorrect 2x10 Standing ball roll to encourage spine extension   Standing narrow rows/extension 2x10 RTB\  Pizza carry 2x10       Modalities  IFC x15 mins x1-150 Hz        HEP GIVEN: written copy given unless otherwise indicated   11/14/23: standing hip , scapula retractions,pizza carry      Charges:, theraex x2 (30 mins) , man  mob x1 (10 mins)  Total Timed Treatment: 40 min  Total Treatment Time: 40 min

## 2023-12-05 ENCOUNTER — OFFICE VISIT (OUTPATIENT)
Dept: PHYSICAL THERAPY | Age: 73
End: 2023-12-05
Attending: ORTHOPAEDIC SURGERY
Payer: MEDICARE

## 2023-12-05 PROCEDURE — 97140 MANUAL THERAPY 1/> REGIONS: CPT

## 2023-12-05 PROCEDURE — 97110 THERAPEUTIC EXERCISES: CPT

## 2023-12-06 ENCOUNTER — TELEPHONE (OUTPATIENT)
Dept: INTERNAL MEDICINE CLINIC | Facility: CLINIC | Age: 73
End: 2023-12-06

## 2023-12-06 NOTE — PROGRESS NOTES
Dx:  S/P lumbar spinal fusion (Z98.1)                Insurance   [facility]         Authorized  # visits by insurance  :  10   Expiration date  of Authorization:2/5/24  Initial POC# of visits: 10         POC cert : 3/8/41  Eval date/latest PN:11/7/23    Authorizing Physician: Dr. Price Ledbetter  Next MD visit: TBD  Fall Risk: standard         Precautions: no noted surgical precaution          Subjective: He still has pain mostly  in am.   PAIN LEVEL:2/10  Assessment:    Progressed  standing activities. Pt reported some pain and needed rest between    See exercises as logged below   Objective: no update        Goals:   goals addressed this day as noted above  Goals: to be met in 10 visits then will reassess      Pt will be I with HEP,its progression and management of symptoms and be I with self correction of upright posturing to continue with gains in therapy. Pt will demonstrate improved AROM in all planes of lumbar motion to 25%, pain and symptoms free to be able do car transfers with ease   Pt will improve strength on BLE graded below 5/5 to at least half a grade or better for ease of walking/standing and stairs management.    Pt will report overall decreased in pain and symptoms and functional limitations  by 50% or better to be able to PLOF  Pt will increase SLS on BLE for improved static/dynamic balance at least 5-10 secs better than initial findings for ease of load transfers with daily tasks on affected LE  pt will demonstrate improved hamstring flexibility on BLE to min loss to improve ADLS and neural mobility  Pt will improve transversus abdominis recruitment to perform proper isometric contraction without requiring verbal or tactile cuing to promote advancement of therex   Pt will demonstrate good understanding of proper posture and body mechanics to decrease pain and improve spinal safety      Pt will have decreased mm tension/hypertonicity on Bparalumbars  to minimal to none in order to lay supine >20 minutes for activities/HEP  Plan: cont per POC     Date: 11/8/2023  TX#: 2/12 Date:      11/14/23         TX#: 3/12 Date:     11/16/23         TX#: 4/12 Date:    11/22/23           TX#: 5/12   Date: 11/29/23  Tx#: 6/12 Date: 12/5/23    Treat 7/12     Date: 12/7/23  Treat : 8/12   Theraex: NU step level 5 x 7 mins  Standing TRA with marches 2x10  Standing hip abd 2x10  Standing hip extension 2x10   Standing heel raises 2x10    LAQ with DF x10 each seated   NU step level 5 x 7 mins    Standing hip  exercises on foam 2x10 x 3 planes    LAQ with DF x10 each  NU step level 5 x 7 mins    Standing hip  exercises on foam 2x10 x 3 planes  LAQ with DF x10 each    Standing heel raises 2x10    Seated pizza carry 2  Shuttle with BLE 5 bands 2x10 then single leg x2x10 with 3 bands   Pillow and towel for head NU step level 5 x 7 mins    Standing hip  exercises on foam 2x10 x 3 planes    Shuttle with BLE 5 bands 2x10 then single leg x2x10 with 3 bands   Pillow and towel for head NU step level 5 x 7 mins    Shuttle with BLE 6 bands 2x10 then single leg x2x10 with 4 bands    Pillow and towel for head    Monster wals YTB x3 rounds  Then monster walks and retro walks    Scapula walks YTB for posture NU step level 5 x 7 mins    Pillow and towel for head  Shuttle with BLE 6 bands 2x10 then single leg x2x10 with 4 bands    Monster wals YTB x3 rounds  Then monster walks and retro walks    Scapula walks YTB for posture    Seated swiss ball flexion 2x5 NU step level 5 x 7 mins    Pillow and towel for head  Shuttle with BLE 6 bands 2x10 then single leg x2x10 with 4 bands    B heel raises in shuttle 2x10 3 bands    Standing hip ex RTB x2x10 on foam x 3 planes   Manual: Seated MFR/STM on B paraspinals parallel to incision)  Seated MFR/STM on B paraspinals parallel to incision) Seated/SL   MFR/STM on B paraspinals parallel to incision) Seated/SL   MF/STM with /STM on B paraspinals parallel to incision) utilized a crosse balls Seated/SL   MF/STM with Josette Thorpening on B paraspinals parallel to incision) utilized a crosse balls and foam roll on piriformis/PSIS and gluteal area Seated/SL   MF/STM with /STM on B paraspinals parallel to incision) utilized a crosse balls and foam roll on piriformis/PSIS and gluteal area   Gait/NMRed: DB in supine and seated DB  TRA facilitation x2 reps  Slouch overcorrect 2x10 Standing ball roll to encourage spine extension   Standing narrow rows/extension 2x10 RTB\  Pizza carry 2x10     Step up 6\" x10 forward and sideways each   Modalities  IFC x15 mins x1-150 Hz         HEP GIVEN: written copy given unless otherwise indicated   11/14/23: standing hip , scapula retractions,pizza carry      Charges:, theraex x2 (30 mins) , man  mob x1 (10 mins)  Total Timed Treatment: 40 min  Total Treatment Time: 40 min

## 2023-12-07 ENCOUNTER — OFFICE VISIT (OUTPATIENT)
Dept: PHYSICAL THERAPY | Age: 73
End: 2023-12-07
Attending: ORTHOPAEDIC SURGERY
Payer: MEDICARE

## 2023-12-07 PROCEDURE — 97140 MANUAL THERAPY 1/> REGIONS: CPT

## 2023-12-07 PROCEDURE — 97110 THERAPEUTIC EXERCISES: CPT

## 2023-12-08 NOTE — PROGRESS NOTES
Dx:  S/P lumbar spinal fusion (Z98.1)                Insurance   [facility]         Authorized  # visits by insurance  :  10   Expiration date  of Authorization:2/5/24  Initial POC# of visits: 10         POC cert : 9/8/12  Eval date/latest PN:11/7/23    Authorizing Physician: Dr. Alvarado Other  Next MD visit: TBD  Fall Risk: standard         Precautions: no noted surgical precaution          Subjective: He still has pain mostly  in am.   PAIN LEVEL:2/10  Assessment:  Pt now doing well and was able to lay down supine . He showed better tolerance and was able to do supine exercises as noted below.  PT continued with MFR  See objectives as noted as pt was able to lay supine     Objective:     Gait:  pt ambulates on level ground with decreased arm swing and stooped posture/forward lean  Functional transfers: mod I in all aspects much ease with bed mobility  AROM: : WFL , painfree on all major joints of LE  Hamstring flexibility: mod loss on BLE         Goals:   goals addressed this day as noted above  Goals: to be met in 10 visits then will reassess      Pt will be I with HEP,its progression and management of symptoms and be I with self correction of upright posturing to continue with gains in therapy.:met   Pt will demonstrate improved AROM in all planes of lumbar motion to 25%, pain and symptoms free to be able do car transfers with ease:in progress   Pt will improve strength on BLE graded below 5/5 to at least half a grade or better for ease of walking/standing and stairs management.:on going   Pt will report overall decreased in pain and symptoms and functional limitations  by 50% or better to be able to PLOF: in pogress  Pt will increase SLS on BLE for improved static/dynamic balance at least 5-10 secs better than initial findings for ease of load transfers with daily tasks on affected LE: in progress  pt will demonstrate improved hamstring flexibility on BLE to min loss to improve ADLS and neural mobility: on going  Pt will improve transversus abdominis recruitment to perform proper isometric contraction without requiring verbal or tactile cuing to promote advancement of therex :improved  Pt will demonstrate good understanding of proper posture and body mechanics to decrease pain and improve spinal safety :improved     Pt will have decreased mm tension/hypertonicity on Bparalumbars  to minimal to none in order to lay supine >20 minutes for activities/HEP:improved  now able to stand and walk more  Plan: cont per POC     Date:     11/16/23         TX#: 4/12 Date:    11/22/23           TX#: 5/12   Date: 11/29/23  Tx#: 6/12 Date: 12/5/23    Treat 7/12     Date: 12/7/23  Treat : 8/12 Date: 12/12/23  Treat : 9/12   Theraex: NU step level 5 x 7 mins    Standing hip  exercises on foam 2x10 x 3 planes  LAQ with DF x10 each    Standing heel raises 2x10    Seated pizza carry 2  Shuttle with BLE 5 bands 2x10 then single leg x2x10 with 3 bands   Pillow and towel for head NU step level 5 x 7 mins    Standing hip  exercises on foam 2x10 x 3 planes    Shuttle with BLE 5 bands 2x10 then single leg x2x10 with 3 bands   Pillow and towel for head NU step level 5 x 7 mins    Shuttle with BLE 6 bands 2x10 then single leg x2x10 with 4 bands    Pillow and towel for head    Monster wals YTB x3 rounds  Then monster walks and retro walks    Scapula walks YTB for posture NU step level 5 x 7 mins    Pillow and towel for head  Shuttle with BLE 6 bands 2x10 then single leg x2x10 with 4 bands    Monster wals YTB x3 rounds  Then monster walks and retro walks    Scapula walks YTB for posture    Seated swiss ball flexion 2x5 NU step level 5 x 7 mins    Pillow and towel for head  Shuttle with BLE 6 bands 2x10 then single leg x2x10 with 4 bands    B heel raises in shuttle 2x10 3 bands    Standing hip ex RTB x2x10 on foam x 3 planes NU step level 5 x 7 mins    Pillow and towel for head  Shuttle with BLE 6 bands 2x10 then single leg x2x10 with 4 bands    B heel raises in shuttle 2x10 3 bands    Supine  Marches x15  Supine hip adduction with ball 5 secs x10    Supine hip abd no bans 2x10  PROM on B hip x10 each planes   Manual: Seated MFR/STM on B paraspinals parallel to incision) Seated/SL   MFR/STM on B paraspinals parallel to incision) Seated/SL   MF/STM with /STM on B paraspinals parallel to incision) utilized a crosse balls Seated/SL   MF/STM with /STM on B paraspinals parallel to incision) utilized a crosse balls and foam roll on piriformis/PSIS and gluteal area Seated/SL   MF/STM with /STM on B paraspinals parallel to incision) utilized a crosse balls and foam roll on piriformis/PSIS and gluteal area Seated/SL   MF/STM with /STM on B paraspinals parallel to incision) utilized a crosse balls and foam roll on piriformis/PSIS and gluteal area      Gait/NMRed:     Step up 6\" x10 forward and sideways each    Modalities          HEP GIVEN: written copy given unless otherwise indicated   11/14/23: standing hip , scapula retractions,pizza carry    Charges:, theraex x2 (30 mins) , man  mob x1 (10 mins)  Total Timed Treatment: 40 min  Total Treatment Time: 40 min

## 2023-12-12 ENCOUNTER — OFFICE VISIT (OUTPATIENT)
Dept: PHYSICAL THERAPY | Age: 73
End: 2023-12-12
Attending: ORTHOPAEDIC SURGERY
Payer: MEDICARE

## 2023-12-12 PROCEDURE — 97140 MANUAL THERAPY 1/> REGIONS: CPT

## 2023-12-12 PROCEDURE — 97110 THERAPEUTIC EXERCISES: CPT

## 2023-12-14 ENCOUNTER — OFFICE VISIT (OUTPATIENT)
Dept: PHYSICAL THERAPY | Age: 73
End: 2023-12-14
Attending: ORTHOPAEDIC SURGERY
Payer: MEDICARE

## 2023-12-14 ENCOUNTER — PATIENT OUTREACH (OUTPATIENT)
Dept: CASE MANAGEMENT | Age: 73
End: 2023-12-14

## 2023-12-14 DIAGNOSIS — M43.10 DEGENERATIVE SPONDYLOLISTHESIS: Primary | ICD-10-CM

## 2023-12-14 DIAGNOSIS — R73.03 PREDIABETES: ICD-10-CM

## 2023-12-14 DIAGNOSIS — I10 ESSENTIAL HYPERTENSION WITH GOAL BLOOD PRESSURE LESS THAN 140/90: ICD-10-CM

## 2023-12-14 DIAGNOSIS — E78.2 MIXED HYPERLIPIDEMIA: ICD-10-CM

## 2023-12-14 PROCEDURE — 97140 MANUAL THERAPY 1/> REGIONS: CPT

## 2023-12-14 PROCEDURE — 97110 THERAPEUTIC EXERCISES: CPT

## 2023-12-14 NOTE — PROGRESS NOTES
Dx:  S/P lumbar spinal fusion (Z98.1)                Insurance   [facility]         Authorized  # visits by insurance  :  10   Expiration date  of Authorization:2/5/24  Initial POC# of visits: 10         POC cert : 5/9/43  Eval date/latest PN:11/7/23    Authorizing Physician: Dr. Mir Boyer MD visit: TBD  Fall Risk: standard         Precautions: no noted surgical precaution          Subjective: He still has pain mostly  in am.   PAIN LEVEL:2/10  Assessment:    Improved lumbar mobility and strength on BLE  Functional sit to stand improved at 12 reps versus 9 reps   Objective:       See objectives   Objective:     Gait:  pt ambulates on level ground with more erect posture  AROM: : WFL , painfree on all major joints of LE  Hamstring flexibility: mod loss on BLE   MMT for   B hip muscles grossly graded  4+/5 except hip extensors able to do bridges now 50%  B knee and ankle DF grossly 4+/5     ROM:(*) denotes pain     With limited lumbar  flexion 25%,   Lumbar extension  50% loss  B lateral flexion :25 % loss  B rotation :NT                                                                     Functional Mobility:  30 sec sit<>stand: 9 rep now at 12 reps     For reference  Postural Control:  4-Stage Balance Test:               - Feet together: 10 sec               - Modified Tandem:  unable sec               - Tandem Stance: unable sec                    - SLS: R 4 sec, L 3 sec   : compensates by keeping LE together        Goals:   goals addressed this day as noted above  Goals: to be met in 10 visits then will reassess      Pt will be I with HEP,its progression and management of symptoms and be I with self correction of upright posturing to continue with gains in therapy.:met   Pt will demonstrate improved AROM in all planes of lumbar motion to 25%, pain and symptoms free to be able do car transfers with ease:in progress   Pt will improve strength on BLE graded below 5/5 to at least half a grade or better for ease of walking/standing and stairs management.:on going   Pt will report overall decreased in pain and symptoms and functional limitations  by 50% or better to be able to PLOF: in pogress  Pt will increase SLS on BLE for improved static/dynamic balance at least 5-10 secs better than initial findings for ease of load transfers with daily tasks on affected LE: in progress  pt will demonstrate improved hamstring flexibility on BLE to min loss to improve ADLS and neural mobility: on going  Pt will improve transversus abdominis recruitment to perform proper isometric contraction without requiring verbal or tactile cuing to promote advancement of therex :improved  Pt will demonstrate good understanding of proper posture and body mechanics to decrease pain and improve spinal safety :improved     Pt will have decreased mm tension/hypertonicity on Bparalumbars  to minimal to none in order to lay supine >20 minutes for activities/HEP:improved  now able to stand and walk more  Plan: cont per POC     Date:     11/16/23         TX#: 4/12 Date:    11/22/23           TX#: 5/12   Date: 11/29/23  Tx#: 6/12 Date: 12/5/23    Treat 7/12     Date: 12/7/23  Treat : 8/12 Date: 12/12/23  Treat : 9/12 Date: 12/14/23  Treat : 10/12   Theraex: NU step level 5 x 7 mins    Standing hip  exercises on foam 2x10 x 3 planes  LAQ with DF x10 each    Standing heel raises 2x10    Seated pizza carry 2  Shuttle with BLE 5 bands 2x10 then single leg x2x10 with 3 bands   Pillow and towel for head NU step level 5 x 7 mins    Standing hip  exercises on foam 2x10 x 3 planes    Shuttle with BLE 5 bands 2x10 then single leg x2x10 with 3 bands   Pillow and towel for head NU step level 5 x 7 mins    Shuttle with BLE 6 bands 2x10 then single leg x2x10 with 4 bands    Pillow and towel for head    Monster wals YTB x3 rounds  Then monster walks and retro walks    Scapula walks YTB for posture NU step level 5 x 7 mins    Pillow and towel for head  Shuttle with BLE 6 bands 2x10 then single leg x2x10 with 4 bands    Monster wals YTB x3 rounds  Then monster walks and retro walks    Scapula walks YTB for posture    Seated swiss ball flexion 2x5 NU step level 5 x 7 mins    Pillow and towel for head  Shuttle with BLE 6 bands 2x10 then single leg x2x10 with 4 bands    B heel raises in shuttle 2x10 3 bands    Standing hip ex RTB x2x10 on foam x 3 planes NU step level 5 x 7 mins    Pillow and towel for head  Shuttle with BLE 6 bands 2x10 then single leg x2x10 with 4 bands    B heel raises in shuttle 2x10 3 bands    Supine  Marches x15  Supine hip adduction with ball 5 secs x10    Supine hip abd no bans 2x10  PROM on B hip x10 each planes NU step level 5 x 7 mins    Pillow and towel for head  Shuttle with BLE 6 bands 2x10 then single leg x2x10 with 4 bands    B heel raises in shuttle 2x10 3 bands    SLR x10  SL hip abd x10  SL clams x10  Bridges x10   Manual: Seated MFR/STM on B paraspinals parallel to incision) Seated/SL   MFR/STM on B paraspinals parallel to incision) Seated/SL   MF/STM with /STM on B paraspinals parallel to incision) utilized a crosse balls Seated/SL   MF/STM with /STM on B paraspinals parallel to incision) utilized a crosse balls and foam roll on piriformis/PSIS and gluteal area Seated/SL   MF/STM with /STM on B paraspinals parallel to incision) utilized a crosse balls and foam roll on piriformis/PSIS and gluteal area Seated/SL   MF/STM with /STM on B paraspinals parallel to incision) utilized a crosse balls and foam roll on piriformis/PSIS and gluteal area    Seated /SL    MF/STM with /STM on B paraspinals parallel to incision) utilized a crosse balls   Gait/NMRed:     Step up 6\" x10 forward and sideways each     Modalities           HEP GIVEN: written copy given unless otherwise indicated   11/14/23: standing hip , scapula retractions,pizza carry    Charges:, theraex x2 (30 mins) , man  mob x1 (10 mins)  Total Timed Treatment: 40 min  Total Treatment Time: 40 min

## 2023-12-14 NOTE — PATIENT INSTRUCTIONS
Dx:  S/P lumbar spinal fusion (Z98.1)                Insurance   [facility]         Authorized  # visits by insurance  :  10   Expiration date  of Authorization:2/5/24  Initial POC# of visits: 10         POC cert : 5/8/41  Eval date/latest PN:11/7/23    Authorizing Physician: Dr. Bri Boyer MD visit: TBD  Fall Risk: standard         Precautions: no noted surgical precaution          Subjective: He was  fine after last session, still has pain in am and stand.  He states that he is fine to be doing the HEP in supine  PAIN LEVEL:2/10  Assessment:   Objective:     Functional Mobility:  30 sec sit<>stand: 9 reps    ROM:(*) denotes pain     With limited lumbar  flexion 25%,   Lumbar extension  75% loss  B lateral flexion :50 % loss  B rotation :NT      MMT for   B hip muscles grossly graded  4/5 except hip extensors NT in prone , unable to do bridges  B knee and ankle DF grossly 4/5  Gait:  pt ambulates on level ground with more erect posture  Functional transfers: mod I in all aspects much ease with bed mobility  AROM: : WFL , painfree on all major joints of LE  Hamstring flexibility: mod loss on BLE         Goals:   goals addressed this day as noted above  Goals: to be met in 10 visits then will reassess      Pt will be I with HEP,its progression and management of symptoms and be I with self correction of upright posturing to continue with gains in therapy.:met   Pt will demonstrate improved AROM in all planes of lumbar motion to 25%, pain and symptoms free to be able do car transfers with ease:in progress   Pt will improve strength on BLE graded below 5/5 to at least half a grade or better for ease of walking/standing and stairs management.:on going   Pt will report overall decreased in pain and symptoms and functional limitations  by 50% or better to be able to PLOF: in pogress  Pt will increase SLS on BLE for improved static/dynamic balance at least 5-10 secs better than initial findings for ease of load transfers with daily tasks on affected LE: in progress  pt will demonstrate improved hamstring flexibility on BLE to min loss to improve ADLS and neural mobility: on going  Pt will improve transversus abdominis recruitment to perform proper isometric contraction without requiring verbal or tactile cuing to promote advancement of therex :improved  Pt will demonstrate good understanding of proper posture and body mechanics to decrease pain and improve spinal safety :improved     Pt will have decreased mm tension/hypertonicity on Bparalumbars  to minimal to none in order to lay supine >20 minutes for activities/HEP:improved  now able to stand and walk more  Plan: cont per POC     Date: 11/29/23  Tx#: 6/12 Date: 12/5/23    Treat 7/12     Date: 12/7/23  Treat : 8/12 Date: 12/12/23  Treat : 9/12 Date: 12/14/23  Treat : 10/12       Theraex: NU step level 5 x 7 mins    Shuttle with BLE 6 bands 2x10 then single leg x2x10 with 4 bands    Pillow and towel for head    Monster wals YTB x3 rounds  Then monster walks and retro walks    Scapula walks YTB for posture NU step level 5 x 7 mins    Pillow and towel for head  Shuttle with BLE 6 bands 2x10 then single leg x2x10 with 4 bands    Monster wals YTB x3 rounds  Then monster walks and retro walks    Scapula walks YTB for posture    Seated swiss ball flexion 2x5 NU step level 5 x 7 mins    Pillow and towel for head  Shuttle with BLE 6 bands 2x10 then single leg x2x10 with 4 bands    B heel raises in shuttle 2x10 3 bands    Standing hip ex RTB x2x10 on foam x 3 planes NU step level 5 x 7 mins    Pillow and towel for head  Shuttle with BLE 6 bands 2x10 then single leg x2x10 with 4 bands    B heel raises in shuttle 2x10 3 bands    Supine  Marches x15  Supine hip adduction with ball 5 secs x10    Supine hip abd no bans 2x10  PROM on B hip x10 each planes NU step level 5 x 7 mins    Pillow and towel for head  Shuttle with BLE 6 bands 2x10 then single leg x2x10 with 4 bands    B heel raises in shuttle 2x10 3 bands  SLR x10  SL hip abd x10  SL clams x10  Bridges x10    Manual: Seated/SL   MF/STM with /STM on B paraspinals parallel to incision) utilized a crosse balls Seated/SL   MF/STM with /STM on B paraspinals parallel to incision) utilized a crosse balls and foam roll on piriformis/PSIS and gluteal area Seated/SL   MF/STM with /STM on B paraspinals parallel to incision) utilized a crosse balls and foam roll on piriformis/PSIS and gluteal area Seated/SL   MF/STM with /STM on B paraspinals parallel to incision) utilized a crosse balls and foam roll on piriformis/PSIS and gluteal area    Seated/SL   MF/STM with /STM on B paraspinals parallel to incision) utilized a crosse balls and foam roll on piriformis/PSIS and gluteal area          Gait/NMRed:   Step up 6\" x10 forward and sideways each      Modalities          HEP GIVEN: written copy given unless otherwise indicated   11/14/23: standing hip , scapula retractions,pizza carry    Charges:, theraex x2 (30 mins) , man  mob x1 (10 mins)  Total Timed Treatment: 40 min  Total Treatment Time: 40 min

## 2023-12-14 NOTE — PROGRESS NOTES
Spoke to Giorgi moran for CCM. Updates to patient care team/comments: None  Patient reported changes in medications: None  Med Adherence  Comment: Pt reports he is taking medications as directed. Health Maintenance: Health maintenance reviewed with patient. Health Maintenance   Topic Date Due    Lung Cancer Screening  Never done    Zoster Vaccines (2 of 3) 11/30/2015 Declined    COVID-19 Vaccine (5 - 2023-24 season) 09/01/2023 Declined    Colorectal Cancer Screening  07/27/2024    PSA  12/19/2024    Influenza Vaccine  Completed    MA Annual Health Assessment  Completed    Annual Depression Screening  Completed    Fall Risk Screening (Annual)  Completed    Pneumococcal Vaccine: 65+ Years  Completed       Patient updates/concerns:    Pt notes pain and stiffness is mostly in the early morning. It takes several hours for him to get going. He continues with physical therapy. States overall he is starting to see the results of physical therapy. Posture has improved, daily steps are increasing. He is walking 2,000-3,000 steps a day. On a couple occasions he was able to meet 4,000-5,000 steps, his biggest barrier is not being jorge to stand for an extended period of time. Goals/Action Plan: Active goal from previous outreach:   NEW GOAL: Complete PT and lose 5 lbs get to 198 lbs                      Where/when/how: By eating healthier, cutting     Patient reported progress towards goals: Pt is making progress towards goal               - What: Continues with PT           - Where/When/How: Pt  only has three PT sessions left. Overall feels it has helped his posture, He  does not feel like he is hunching over as much when he is walking. Steps have increased slowly. Patient Reported Barriers and Concerns: He is not able to stand for long periods of time. - Plan for overcoming barriers: He will speak with physical therapist at today's appointment.  Discuss exercises or what he can do to help improve this.     Care Managers Interventions: Wished patient Happy Birthday. Praised patient on efforts to continue with physical therapy even though he was feeling a bit discouraged in the past.    Encouraged patient to continue with PT at home. Eat balanced meals and stay hydrated. Reviewed Future Appointments:   Future Appointments   Date Time Provider Leif Morris   12/14/2023  1:15 PM Juan M Race, PT ADO [facility] [place]   12/19/2023 11:45 AM Juan M Race, PT ADO PT [facility] [place]   12/21/2023 11:45 AM Juan M Race, PT ADO PT [facility] [place]   4/15/2024  5:30 PM [facility]     Next Care Manager Follow Up Date: One month    Reason For Follow Up: review progress and or barriers towards patient's goals. Time Spent This Encounter Total: 27  min medical record review, telephone communication, care plan updates where needed, education, goals, and action plan recreation/update. Provided acknowledgment and validation to patient's concerns.    Monthly Minute Total including today: 27  Physical assessment, complete health history, and need for CCM established by Lv Adams MD.

## 2023-12-15 NOTE — PROGRESS NOTES
Dx:  S/P lumbar spinal fusion (Z98.1)                Insurance   [facility]         Authorized  # visits by insurance  :  10   Expiration date  of Authorization:2/5/24  Initial POC# of visits: 10         POC cert : 2/0/67  Eval date/latest PN:11/7/23    Authorizing Physician: Dr. Ani Hinton  Next MD visit: TBD  Fall Risk: standard         Precautions: no noted surgical precaution          Subjective: Pt states that he relates that he had pain after last session.  He is a bit better and states that overall he could walk and stand longer and feels stronger      PAIN LEVEL:2/10  Assessment:  Pt modified exercises to be mostly in standing due to pt's increased in pain last session and focused on MFR    For reference    Improved lumbar mobility and strength on BLE  Functional sit to stand improved at 12 reps versus 9 reps   Objective:       See objectives   Objective:     Gait:  pt ambulates on level ground with more erect posture  AROM: : WFL , painfree on all major joints of LE  Hamstring flexibility: mod loss on BLE   MMT for   B hip muscles grossly graded  4+/5 except hip extensors able to do bridges now 50%  B knee and ankle DF grossly 4+/5     ROM:(*) denotes pain     With limited lumbar  flexion 25%,   Lumbar extension  50% loss  B lateral flexion :25 % loss  B rotation :NT                                                                     Functional Mobility:  30 sec sit<>stand: 9 rep now at 12 reps     For reference  Postural Control:  4-Stage Balance Test:               - Feet together: 10 sec               - Modified Tandem:  unable sec               - Tandem Stance: unable sec                    - SLS: R 4 sec, L 3 sec   : compensates by keeping LE together        Goals:   goals addressed this day as noted above  Goals: to be met in 10 visits then will reassess      Pt will be I with HEP,its progression and management of symptoms and be I with self correction of upright posturing to continue with gains in therapy.:met Pt will demonstrate improved AROM in all planes of lumbar motion to 25%, pain and symptoms free to be able do car transfers with ease:in progress   Pt will improve strength on BLE graded below 5/5 to at least half a grade or better for ease of walking/standing and stairs management.:on going   Pt will report overall decreased in pain and symptoms and functional limitations  by 50% or better to be able to PLOF: in pogress  Pt will increase SLS on BLE for improved static/dynamic balance at least 5-10 secs better than initial findings for ease of load transfers with daily tasks on affected LE: in progress  pt will demonstrate improved hamstring flexibility on BLE to min loss to improve ADLS and neural mobility: on going  Pt will improve transversus abdominis recruitment to perform proper isometric contraction without requiring verbal or tactile cuing to promote advancement of therex :improved  Pt will demonstrate good understanding of proper posture and body mechanics to decrease pain and improve spinal safety :improved     Pt will have decreased mm tension/hypertonicity on Bparalumbars  to minimal to none in order to lay supine >20 minutes for activities/HEP:improved  now able to stand and walk more  Plan: cont per POC     Date:    11/22/23           TX#: 5/12   Date: 11/29/23  Tx#: 6/12 Date: 12/5/23    Treat 7/12     Date: 12/7/23  Treat : 8/12 Date: 12/12/23  Treat : 9/12 Date: 12/14/23  Treat : 10/12 Date: 12/19/23  Treat : 11/12      Theraex: NU step level 5 x 7 mins    Standing hip  exercises on foam 2x10 x 3 planes    Shuttle with BLE 5 bands 2x10 then single leg x2x10 with 3 bands   Pillow and towel for head NU step level 5 x 7 mins    Shuttle with BLE 6 bands 2x10 then single leg x2x10 with 4 bands    Pillow and towel for head    Monster wals YTB x3 rounds  Then monster walks and retro walks    Scapula walks YTB for posture NU step level 5 x 7 mins    Pillow and towel for head  Shuttle with BLE 6 bands 2x10 then single leg x2x10 with 4 bands    Monster wals YTB x3 rounds  Then monster walks and retro walks    Scapula walks YTB for posture    Seated swiss ball flexion 2x5 NU step level 5 x 7 mins    Pillow and towel for head  Shuttle with BLE 6 bands 2x10 then single leg x2x10 with 4 bands    B heel raises in shuttle 2x10 3 bands    Standing hip ex RTB x2x10 on foam x 3 planes NU step level 5 x 7 mins    Pillow and towel for head  Shuttle with BLE 6 bands 2x10 then single leg x2x10 with 4 bands    B heel raises in shuttle 2x10 3 bands    Supine  Marches x15  Supine hip adduction with ball 5 secs x10    Supine hip abd no bans 2x10  PROM on B hip x10 each planes NU step level 5 x 7 mins    Pillow and towel for head  Shuttle with BLE 6 bands 2x10 then single leg x2x10 with 4 bands    B heel raises in shuttle 2x10 3 bands    SLR x10  SL hip abd x10  SL clams x10  Bridges x10 NU step level 5 x 7 mins    Pillow and towel for head  Shuttle with BLE 6 bands 3x10 then single leg x 3 x10 with 4 bands    Standing hip exercises RTB x10 x3 planes, 2 leela for abd   Manual: Seated/SL   MFR/STM on B paraspinals parallel to incision) Seated/SL   MF/STM with /STM on B paraspinals parallel to incision) utilized a crosse balls Seated/SL   MF/STM with /STM on B paraspinals parallel to incision) utilized a crosse balls and foam roll on piriformis/PSIS and gluteal area Seated/SL   MF/STM with /STM on B paraspinals parallel to incision) utilized a crosse balls and foam roll on piriformis/PSIS and gluteal area Seated/SL   MF/STM with /STM on B paraspinals parallel to incision) utilized a crosse balls and foam roll on piriformis/PSIS and gluteal area    Seated /SL    MF/STM with /STM on B paraspinals parallel to incision) utilized a crosse balls Seated /SL    MF/STM with /STM on B paraspinals parallel to incision) utilized a crosse balls/foam roll   Gait/NMRed:    Step up 6\" x10 forward and sideways each      Modalities           HEP GIVEN: written copy given unless otherwise indicated   11/14/23: standing hip , scapula retractions,pizza carry    Charges:, theraex x1 (15 mins) , man  mob x2 (25 mins)  Total Timed Treatment: 40 min  Total Treatment Time: 40 min

## 2023-12-19 ENCOUNTER — OFFICE VISIT (OUTPATIENT)
Dept: PHYSICAL THERAPY | Age: 73
End: 2023-12-19
Attending: ORTHOPAEDIC SURGERY
Payer: MEDICARE

## 2023-12-19 PROCEDURE — 97110 THERAPEUTIC EXERCISES: CPT

## 2023-12-19 PROCEDURE — 97140 MANUAL THERAPY 1/> REGIONS: CPT

## 2023-12-19 NOTE — PROGRESS NOTES
Discharge summary    Pt has attended 12 visits in Physical Therapy    . Dx:  S/P lumbar spinal fusion (Z98.1)                Insurance   [facility]         Authorized  # visits by insurance  :  10   Expiration date  of Authorization:2/5/24  Initial POC# of visits: 10         POC cert : 3/0/23  Eval date/latest PN:11/7/23    Authorizing Physician: Dr. Karsten Barker  Next MD visit: TBD  Fall Risk: standard         Precautions: no noted surgical precaution          Subjective: Pt states that he feels 25-30%  overall  , he still has  issues with  standing , pain will increased to 6/10 at worst    PAIN LEVEL:2/10  Assessment:  Walter Kathleen attended 12  visits for Physical Therapy. Walter Kathleen reports being back to prior level of function/better  by 25-30% . Patient reports continued difficulty with above reported functions but pain no worse than 6/10 . Walter Kathleen is agreeable and feels that symptoms could be managed I at home with HEP. Pt advised to follow up with MD if symptoms persist    Walter Kathleen demonstrates better understanding and ability  to self correct upright posturing and how to minimize symptoms and pain with proper body mechanics/strategies       Walter Kathleen demonstrates improved and now more erect and  symmetrical  gait with minimal to no  compensation when managing stairs. Transitional transfers done I   Overall improvement is noted on strength and  AROM  on affected areas of BLE/lumbar /cervical.    Strength, functional skills tolerance and balance improved as indicated by progress with 30 sec sit to stand test .    Walter Kathleen now with decreased tenderness/hypertonicity on  LS area    Pt will be discharged from skilled PT at this time, being a good candidate to continue with HEP I. Pt voiced understanding and performs HEP   correctly without reported pain. Instructions were provided on how to modify as need or when to stop. Objectives and goals as noted below. Overall functional outcomes measures are noted to be improved.         Objective: Oswestry Disability Index Score  Score: 26 % (11/7/2023  9:02 AM)    Post Oswestry Disability Index Score  Post Score: 16 % (12/21/2023 12:09 PM)    10 % improvement     Gait:  pt ambulates on level ground with more erect posture  AROM: : WFL , painfree on all major joints of LE  Hamstring flexibility: mod loss on BLE   MMT for   B hip muscles grossly graded  4+/5 except hip extensors able to do bridges now 50%  B knee and ankle DF grossly 4+/5     ROM:(*) denotes pain     With limited lumbar  flexion 25%,   Lumbar extension  50% loss  B lateral flexion :25 % loss  B rotation :NT                                                                     Functional Mobility:  30 sec sit<>stand: 9 rep now at 12 reps     For reference  Postural Control:  4-Stage Balance Test:               - Feet together: 10 sec               - Modified Tandem:  unable sec               - Tandem Stance: unable sec                    - SLS: R 4 sec, L 3 sec   : compensates by keeping LE together        Goals:   goals addressed this day as noted above  Goals: to be met in 10 visits then will reassess      Pt will be I with HEP,its progression and management of symptoms and be I with self correction of upright posturing to continue with gains in therapy.:met   Pt will demonstrate improved AROM in all planes of lumbar motion to 25%, pain and symptoms free to be able do car transfers with ease:in progress   Pt will improve strength on BLE graded below 5/5 to at least half a grade or better for ease of walking/standing and stairs management.:on going   Pt will report overall decreased in pain and symptoms and functional limitations  by 50% or better to be able to PLOF: in pogress  Pt will increase SLS on BLE for improved static/dynamic balance at least 5-10 secs better than initial findings for ease of load transfers with daily tasks on affected LE: in progress  pt will demonstrate improved hamstring flexibility on BLE to min loss to improve ADLS and neural mobility: on going  Pt will improve transversus abdominis recruitment to perform proper isometric contraction without requiring verbal or tactile cuing to promote advancement of therex :improved  Pt will demonstrate good understanding of proper posture and body mechanics to decrease pain and improve spinal safety :improved     Pt will have decreased mm tension/hypertonicity on Bparalumbars  to minimal to none in order to lay supine >20 minutes for activities/HEP:improved  now able to stand and walk more:improved  Plan: cont per POC     Date:    11/22/23           TX#: 5/12   Date: 11/29/23  Tx#: 6/12 Date: 12/5/23    Treat 7/12     Date: 12/7/23  Treat : 8/12 Date: 12/12/23  Treat : 9/12 Date: 12/14/23  Treat : 10/12 Date: 12/19/23  Treat : 11/12    Date: 12/21/23  Tx: 12/12   Theraex: NU step level 5 x 7 mins    Standing hip  exercises on foam 2x10 x 3 planes    Shuttle with BLE 5 bands 2x10 then single leg x2x10 with 3 bands   Pillow and towel for head NU step level 5 x 7 mins    Shuttle with BLE 6 bands 2x10 then single leg x2x10 with 4 bands    Pillow and towel for head    Monster wals YTB x3 rounds  Then monster walks and retro walks    Scapula walks YTB for posture NU step level 5 x 7 mins    Pillow and towel for head  Shuttle with BLE 6 bands 2x10 then single leg x2x10 with 4 bands    Monster wals YTB x3 rounds  Then monster walks and retro walks    Scapula walks YTB for posture    Seated swiss ball flexion 2x5 NU step level 5 x 7 mins    Pillow and towel for head  Shuttle with BLE 6 bands 2x10 then single leg x2x10 with 4 bands    B heel raises in shuttle 2x10 3 bands    Standing hip ex RTB x2x10 on foam x 3 planes NU step level 5 x 7 mins    Pillow and towel for head  Shuttle with BLE 6 bands 2x10 then single leg x2x10 with 4 bands    B heel raises in shuttle 2x10 3 bands    Supine  Marches x15  Supine hip adduction with ball 5 secs x10    Supine hip abd no bans 2x10  PROM on B hip x10 each planes NU step level 5 x 7 mins    Pillow and towel for head  Shuttle with BLE 6 bands 2x10 then single leg x2x10 with 4 bands    B heel raises in shuttle 2x10 3 bands    SLR x10  SL hip abd x10  SL clams x10  Bridges x10 NU step level 5 x 7 mins    Pillow and towel for head  Shuttle with BLE 6 bands 3x10 then single leg x 3 x10 with 4 bands    Standing hip exercises RTB x10 x3 planes, 2 leela for abd NU step level 5 x 7 mins    Pillow and towel for head  Shuttle with BLE 6 bands 3x10 then single leg x 3 x10 with 4 bands    Lateral steps x3 rounds    Monster walks RTB x 3 rounds       Manual: Seated/SL   MFR/STM on B paraspinals parallel to incision) Seated/SL   MF/STM with /STM on B paraspinals parallel to incision) utilized a crosse balls Seated/SL   MF/STM with /STM on B paraspinals parallel to incision) utilized a crosse balls and foam roll on piriformis/PSIS and gluteal area Seated/SL   MF/STM with /STM on B paraspinals parallel to incision) utilized a crosse balls and foam roll on piriformis/PSIS and gluteal area Seated/SL   MF/STM with /STM on B paraspinals parallel to incision) utilized a crosse balls and foam roll on piriformis/PSIS and gluteal area    Seated /SL    MF/STM with /STM on B paraspinals parallel to incision) utilized a crosse balls Seated /SL    MF/STM with /STM on B paraspinals parallel to incision) utilized a crosse balls/foam roll Seated /SL    MF/STM with /STM on B paraspinals parallel to incision) utilized a crosse balls/foam roll   Gait/NMRed:    Step up 6\" x10 forward and sideways each       Modalities            HEP GIVEN: written copy given unless otherwise indicated   11/14/23: standing hip , scapula retractions,pizza carry    Charges:, theraex x1 (15 mins) , man  mob x2 (25 mins)  Total Timed Treatment: 40 min  Total Treatment Time: 40 min

## 2023-12-21 ENCOUNTER — APPOINTMENT (OUTPATIENT)
Dept: PHYSICAL THERAPY | Age: 73
End: 2023-12-21
Attending: ORTHOPAEDIC SURGERY
Payer: MEDICARE

## 2023-12-21 ENCOUNTER — OFFICE VISIT (OUTPATIENT)
Dept: PHYSICAL THERAPY | Age: 73
End: 2023-12-21
Attending: ORTHOPAEDIC SURGERY
Payer: MEDICARE

## 2023-12-21 PROCEDURE — 97140 MANUAL THERAPY 1/> REGIONS: CPT

## 2023-12-21 PROCEDURE — 97110 THERAPEUTIC EXERCISES: CPT

## 2023-12-21 RX ORDER — LISINOPRIL 10 MG/1
10 TABLET ORAL DAILY
Qty: 90 TABLET | Refills: 3 | Status: SHIPPED | OUTPATIENT
Start: 2023-12-21

## 2023-12-21 RX ORDER — SIMVASTATIN 20 MG
20 TABLET ORAL EVERY EVENING
Qty: 90 TABLET | Refills: 3 | Status: SHIPPED | OUTPATIENT
Start: 2023-12-21

## 2023-12-21 NOTE — TELEPHONE ENCOUNTER
Please review. Protocol failed / Has no protocol.      Requested Prescriptions   Pending Prescriptions Disp Refills    LISINOPRIL 10 MG Oral Tab [Pharmacy Med Name: LISINOPRIL 10MG TABLETS] 90 tablet 3     Sig: TAKE 1 TABLET(10 MG) BY MOUTH DAILY       Hypertensive Medications Protocol Passed - 12/20/2023  6:01 AM        Passed - In person appointment in the past 12 or next 3 months     Recent Outpatient Visits              Today     CELSO Villegas in [facility], [address]    Office Visit    2 days ago     CELSO Villegas in [facility], [facility] Visit    1 week ago     CELSO Villegas in [facility], [address]    Office Visit    1 week ago     CELSO Villegas in [facility], [address]    Office Visit    2 weeks ago     CELSO Villegas in [facility], [address]    Office Visit          Future Appointments         Provider Department Appt Notes    In 3 months Luiz Flores MD [address] [facility] last px 10/16/23 Over due for lung CA screen               Passed - Last BP reading less than 140/90     BP Readings from Last 1 Encounters:   10/16/23 110/70               Passed - CMP or BMP in past 6 months     Recent Results (from the past 4392 hour(s))   Comp Metabolic Panel (14)    Collection Time: 07/19/23 11:51 AM   Result Value Ref Range    Glucose 119 (H) 70 - 99 mg/dL    Sodium 142 136 - 145 mmol/L    Potassium 4.0 3.5 - 5.1 mmol/L    Chloride 108 98 - 112 mmol/L    CO2 27.0 21.0 - 32.0 mmol/L    Anion Gap 7 0 - 18 mmol/L    BUN 15 7 - 18 mg/dL    Creatinine 0.91 0.70 - 1.30 mg/dL    BUN/CREA Ratio 16.5 10.0 - 20.0    Calcium, Total 8.3 (L) 8.5 - 10.1 mg/dL    Calculated Osmolality 296 (H) 275 - 295 mOsm/kg    eGFR-Cr 88 >=60 mL/min/1.73m2    ALT 29 16 - 61 U/L    AST 19 15 - 37 U/L    Alkaline Phosphatase 56 45 - 117 U/L    Bilirubin, Total 0.6 0.1 - 2.0 mg/dL    Total Protein 6.8 6.4 - 8.2 g/dL Albumin 3.2 (L) 3.4 - 5.0 g/dL    Globulin  3.6 2.8 - 4.4 g/dL    A/G Ratio 0.9 (L) 1.0 - 2.0    Patient Fasting for CMP? No      *Note: Due to a large number of results and/or encounters for the requested time period, some results have not been displayed. A complete set of results can be found in Results Review.                Passed - In person appointment or virtual visit in the past 6 months     Recent Outpatient Visits              Today     [facility] [address] in [facility], [facility], [address]    Office Visit    2 days ago     [address] in [facility], [facility], [address]    Office Visit    1 week ago     [address] in [facility], [facility], [address]    Office Visit    1 week ago     [address] in [place], [address]    Office Visit    2 weeks ago     [facility] [address] in [place], [address]    Office Visit          Future Appointments         Provider Department Appt Notes    In 3 months MD Brock Valenzuela [place] last px 10/16/23 Over due for lung CA screen               Passed - EGFRCR or GFRNAA > 50     GFR Evaluation  EGFRCR: 88 , resulted on 7/19/2023            SIMVASTATIN 20 MG Oral Tab [Pharmacy Med Name: SIMVASTATIN 20MG TABLETS] 90 tablet 3     Sig: TAKE 1 TABLET(20 MG) BY MOUTH EVERY EVENING       Cholesterol Medication Protocol Failed - 12/20/2023  6:01 AM        Failed - LDL in past 12 months        Failed - Last LDL < 130     Lab Results   Component Value Date    LDL 28 12/19/2022             Passed - ALT in past 12 months        Passed - Last ALT < 80     Lab Results   Component Value Date    ALT 29 07/19/2023             Passed - In person appointment or virtual visit in the past 12 mos or appointment in next 3 mos     Recent Outpatient Visits              Today     [facility] in [place], [address] Visit    2 days ago     [facility] [address] in [facility], [facility]    Office Visit    1 week ago     CELSO Villegas in [facility] [place], [address]    Office Visit    1 week ago     CELSO Villegas in [facility] [place], [address]    Office Visit    2 weeks ago     CELSO Villegas in [facility] [place], [address]    Office Visit          Future Appointments         Provider Department Appt Notes    In 3 months Page Clifford MD [address], [place] last px 10/16/23 Over due for lung CA screen                  Future Appointments         Provider Department Appt Notes    In 3 months Page Clifford MD [address], [place] last px 10/16/23 Over due for lung CA screen           Recent Outpatient Visits              Today     CELSO Villegas in [facility] [place], [address]    Office Visit    2 days ago     CELSO Villegas in [facility] 18    1 week ago     CELSO Villegas in [facility] 18    1 week ago     CELSO Villegas in [facility] 18    2 weeks ago     CELSO Villegas in [facility] [place], [address]    Office Visit

## 2024-01-25 ENCOUNTER — PATIENT OUTREACH (OUTPATIENT)
Dept: CASE MANAGEMENT | Age: 74
End: 2024-01-25

## 2024-01-25 DIAGNOSIS — M54.16 SPINAL STENOSIS OF LUMBAR REGION WITH RADICULOPATHY: Primary | ICD-10-CM

## 2024-01-25 DIAGNOSIS — I70.0 ATHEROSCLEROSIS OF AORTA (HCC): ICD-10-CM

## 2024-01-25 DIAGNOSIS — M48.061 SPINAL STENOSIS OF LUMBAR REGION WITH RADICULOPATHY: Primary | ICD-10-CM

## 2024-01-25 DIAGNOSIS — G89.29 CHRONIC BILATERAL LOW BACK PAIN WITH LEFT-SIDED SCIATICA: ICD-10-CM

## 2024-01-25 DIAGNOSIS — I10 ESSENTIAL HYPERTENSION WITH GOAL BLOOD PRESSURE LESS THAN 140/90: ICD-10-CM

## 2024-01-25 DIAGNOSIS — E78.2 MIXED HYPERLIPIDEMIA: ICD-10-CM

## 2024-01-25 DIAGNOSIS — M54.42 CHRONIC BILATERAL LOW BACK PAIN WITH LEFT-SIDED SCIATICA: ICD-10-CM

## 2024-01-25 NOTE — PROGRESS NOTES
Spoke to Enmanuel for CCM.      Updates to patient care team/comments: None  Patient reported changes in medications: None  Med Adherence  Comment: Pt reports he is taking medications as directed by his providers.      Health Maintenance:  Reviewed health maintenance up dated as needed.  Health Maintenance   Topic Date Due    Lung Cancer Screening  Never done Pt to schedule it in the spring.    MA Annual Health Assessment  01/01/2024 Scheduled    Annual Depression Screening  01/01/2024 Updated    Fall Risk Screening (Annual)  01/01/2024 Updated    Zoster Vaccines (2 of 3) 12/14/2024 (Originally 11/30/2015)    COVID-19 Vaccine (5 - 2023-24 season) 12/14/2024 (Originally 9/1/2023)    Colorectal Cancer Screening  07/27/2024    PSA  12/19/2024    Influenza Vaccine  Completed    Pneumococcal Vaccine: 65+ Years  Completed       Patient updates/concerns:     After undergoing surgery five months ago, he continues to experience ongoing discomfort and discouragement. Mornings are particularly challenging, with the feeling of being crippled that gradually improved after several hours.He is not able to tolerate standing for more than five minutes at a  time. Patient is  scheduled to follow up with Dr. Underwood on Monday. Pt notes Pregablin seems ineffective, providing no relief.    Goals/Action Plan:    Active goal from previous outreach:   Complete PT and lose 5 lbs get to 198 lbs                      Where/when/how: By eating healthier, cutting     Patient reported progress towards goals: Pt  made progress towards goal.               - What: Completed PT           - Where/When/How: Pt completed 12 sessions of physical therapy. He is feeling a bit more steady when walking.   Patient Reported Barriers and Concerns: He is not able to stand for more than five minutes without feeling pain.                     - Plan for overcoming barriers: Pt is considering a spinal cord stimulator. Plans on discussing with Dr. Underwood at upcoming  OV    Care Managers Interventions:     Fall risk  and depression screen updated    Care every where updated    Immunization query completed. No updates available at this time.     Social determinants of health updated     Encouraged three balanced meals along with 20-30 minutes of daily exercise and stretching.     Provided support. Encouraged patient to call as needed.       Reviewed Future Appointments:   Future Appointments   Date Time Provider Department Center   4/15/2024  5:30 PM [facility]     Next Care Manager Follow Up Date: One month    Reason For Follow Up: review progress and or barriers towards patient's goals.     Time Spent This Encounter Total: 27 min medical record review, telephone communication, care plan updates where needed, education, goals, and action plan recreation/update. Provided acknowledgment and validation to patient's concerns.   Monthly Minute Total including today: 27  Physical assessment, complete health history, and need for CCM established by Betito Garcia MD.

## 2024-02-26 ENCOUNTER — PATIENT OUTREACH (OUTPATIENT)
Dept: CASE MANAGEMENT | Age: 74
End: 2024-02-26

## 2024-02-26 DIAGNOSIS — E78.2 MIXED HYPERLIPIDEMIA: ICD-10-CM

## 2024-02-26 DIAGNOSIS — M43.10 DEGENERATIVE SPONDYLOLISTHESIS: ICD-10-CM

## 2024-02-26 DIAGNOSIS — I10 ESSENTIAL HYPERTENSION WITH GOAL BLOOD PRESSURE LESS THAN 140/90: Primary | ICD-10-CM

## 2024-02-26 DIAGNOSIS — R73.03 PREDIABETES: ICD-10-CM

## 2024-02-26 DIAGNOSIS — I70.0 ATHEROSCLEROSIS OF AORTA (HCC): ICD-10-CM

## 2024-02-26 DIAGNOSIS — M54.42 CHRONIC BILATERAL LOW BACK PAIN WITH LEFT-SIDED SCIATICA: ICD-10-CM

## 2024-02-26 DIAGNOSIS — G89.29 CHRONIC BILATERAL LOW BACK PAIN WITH LEFT-SIDED SCIATICA: ICD-10-CM

## 2024-02-26 NOTE — PROGRESS NOTES
Spoke to Enmanuel for CCM.      Updates to patient care team/comments: None  Patient reported changes in medications: None    Med Adherence  Comment: Pt confirms he is taking medications as instructed by providers.     Health Maintenance: Reviewed and updated  Health Maintenance   Topic Date Due    Lung Cancer Screening  Never done Plans on scheduling    MA Annual Health Assessment  01/01/2024 Scheduled    Colorectal Cancer Screening  07/27/2024    Zoster Vaccines (2 of 3) 12/14/2024 (Originally 11/30/2015)    COVID-19 Vaccine (5 - 2023-24 season) 12/14/2024 (Originally 9/1/2023)    PSA  12/19/2024    Influenza Vaccine  Completed    Annual Depression Screening  Completed    Fall Risk Screening (Annual)  Completed    Pneumococcal Vaccine: 65+ Years  Completed     Patient updates/concerns:     He continues to experiencing back issues and struggles to  one place for extended periods. Nevertheless, he's gradually increasing his walking distance each day, and his surgeon has lifted restrictions. He is in the process of applying for disability through the [employer].    Goals/Action Plan:    Active goal from previous outreach:   Complete PT and lose 5 lbs get to 198 lbs     Where/when/how: By eating healthier, cutting      Patient reported progress towards goals: Pt remains committed to meeting goal.               - What: Pt weight is at 207 lbs           - Where/When/How: He's making an effort to get up and walk at least a mile everyday, and he's putting in a lot of effort to gradually increase his endurance.   Patient Reported Barriers and Concerns: Pt reports he does not have any new barriers. He continues to work on overcoming his biggest barriers which is back pain.                   - Plan for overcoming barriers: He will continue to work on PT exercises on his own and keep walking as long as tolerated.    Care Managers Interventions:     Encouraged three balanced meals along with 20-30 minutes of daily exercise and  stretching.     Discussed the importance of keeping up with PT exercises on his own.    Care every where updated    Provided support. Encouraged patient to call as needed.    Reviewed Future Appointments:   Future Appointments   Date Time Provider Department Center   4/15/2024  5:30 PM Betito Garcia MD [facility]     Next Care Manager Follow Up Date: One month. Encouraged patient to call as needed.    Reason For Follow Up: review progress and or barriers towards patient's goals.     Time Spent This Encounter Total: 28 min medical record review, telephone communication, care plan updates where needed, education, goals, and action plan recreation/update. Provided acknowledgment and validation to patient's concerns.   Monthly Minute Total including today: 28  Physical assessment, complete health history, and need for CCM established by Betito Garcia MD.

## 2024-02-29 PROCEDURE — 1159F MED LIST DOCD IN RCRD: CPT

## 2024-02-29 PROCEDURE — 99490 CHRNC CARE MGMT STAFF 1ST 20: CPT

## 2024-03-08 ENCOUNTER — PATIENT OUTREACH (OUTPATIENT)
Dept: CASE MANAGEMENT | Age: 74
End: 2024-03-08

## 2024-03-08 DIAGNOSIS — I10 ESSENTIAL HYPERTENSION WITH GOAL BLOOD PRESSURE LESS THAN 140/90: ICD-10-CM

## 2024-03-08 DIAGNOSIS — E78.2 MIXED HYPERLIPIDEMIA: ICD-10-CM

## 2024-03-08 DIAGNOSIS — I70.0 ATHEROSCLEROSIS OF AORTA (HCC): Primary | ICD-10-CM

## 2024-03-08 DIAGNOSIS — E66.09 CLASS 1 OBESITY DUE TO EXCESS CALORIES WITHOUT SERIOUS COMORBIDITY WITH BODY MASS INDEX (BMI) OF 31.0 TO 31.9 IN ADULT: ICD-10-CM

## 2024-03-08 DIAGNOSIS — R73.03 PREDIABETES: ICD-10-CM

## 2024-03-08 RX ORDER — CELECOXIB 200 MG/1
1 CAPSULE ORAL 2 TIMES DAILY WITH MEALS
COMMUNITY
Start: 2024-02-12

## 2024-03-08 NOTE — PROGRESS NOTES
Spoke to Enmanuel for CCM.      Updates to patient care team/comments: None  Patient reported changes in medications: Celebrex reconciled.  Med Adherence  Comment: Pt is taking as directed.     Health Maintenance: Reviewed and updated as needed.  Health Maintenance   Topic Date Due    Lung Cancer Screening  Never done    MA Annual Health Assessment  01/01/2024 Scheduled    Colorectal Cancer Screening  07/27/2024    Zoster Vaccines (2 of 3) 12/14/2024 (Originally 11/30/2015)    COVID-19 Vaccine (5 - 2023-24 season) 12/14/2024 (Originally 9/1/2023)    PSA  12/19/2024    Influenza Vaccine  Completed    Annual Depression Screening  Completed    Fall Risk Screening (Annual)  Completed    Pneumococcal Vaccine: 65+ Years  Completed     Patient updates/concerns:    The patient reports he visited [facility] in [place] due to feeling very unsteady. He mentions experiencing episodes where he bumps into the walls while walking and relies on using his hands for support. His sons expressed concerns and encouraged him to follow up for a new evaluation in the office.    Goals/Action Plan:    Active goal from previous outreach:   Complete PT and lose 5 lbs get to 198 lbs                 Where/when/how: By eating healthier, cutting     Patient reported progress towards goals               - What: He is making an effort to eat healthier.           - Where/When/How: States he has cut back on eating too much bread and he is walking 5,000 steps daily.  Patient Reported Barriers and Concerns: Feels unsteady.                   - Plan for overcoming barriers: He will follow up in the office for an assessment.     Care Managers Interventions:     Encouraged the patient to seek medical attention. Discussed the fall risk precautions, and assisted in scheduling an appointment for an evaluation on unsteady gait.    Reviewed Future Appointments:   Future Appointments   Date Time Provider Department Center   3/11/2024  3:20 PM W[facility]  [facility]   4/15/2024  5:30 PM Betito Garcia MD [facility]     Next Care Manager Follow Up Date: One month    Reason For Follow Up: review progress and or barriers towards patient's goals.     Time Spent This Encounter Total: 28 min medical record review, telephone communication, care plan updates where needed, education, goals, and action plan recreation/update. Provided acknowledgment and validation to patient's concerns.   Monthly Minute Total including today: 28 min  Physical assessment, complete health history, and need for CCM established by Betito Garcia MD.

## 2024-03-11 ENCOUNTER — LAB ENCOUNTER (OUTPATIENT)
Dept: LAB | Age: 74
End: 2024-03-11
Attending: NURSE PRACTITIONER
Payer: MEDICARE

## 2024-03-11 ENCOUNTER — OFFICE VISIT (OUTPATIENT)
Dept: INTERNAL MEDICINE CLINIC | Facility: CLINIC | Age: 74
End: 2024-03-11

## 2024-03-11 VITALS
DIASTOLIC BLOOD PRESSURE: 70 MMHG | WEIGHT: 204 LBS | BODY MASS INDEX: 30.92 KG/M2 | SYSTOLIC BLOOD PRESSURE: 103 MMHG | HEART RATE: 97 BPM | RESPIRATION RATE: 16 BRPM | HEIGHT: 68 IN

## 2024-03-11 DIAGNOSIS — R42 DIZZINESS: ICD-10-CM

## 2024-03-11 DIAGNOSIS — I10 ESSENTIAL HYPERTENSION WITH GOAL BLOOD PRESSURE LESS THAN 140/90: ICD-10-CM

## 2024-03-11 DIAGNOSIS — R42 DIZZINESS: Primary | ICD-10-CM

## 2024-03-11 LAB
ALBUMIN SERPL-MCNC: 3.9 G/DL (ref 3.2–4.8)
ALBUMIN/GLOB SERPL: 1.3 {RATIO} (ref 1–2)
ALP LIVER SERPL-CCNC: 84 U/L
ALT SERPL-CCNC: 19 U/L
ANION GAP SERPL CALC-SCNC: 3 MMOL/L (ref 0–18)
AST SERPL-CCNC: 20 U/L (ref ?–34)
BILIRUB SERPL-MCNC: 0.6 MG/DL (ref 0.2–1.1)
BUN BLD-MCNC: 17 MG/DL (ref 9–23)
BUN/CREAT SERPL: 21 (ref 10–20)
CALCIUM BLD-MCNC: 9.1 MG/DL (ref 8.7–10.4)
CHLORIDE SERPL-SCNC: 108 MMOL/L (ref 98–112)
CO2 SERPL-SCNC: 29 MMOL/L (ref 21–32)
CREAT BLD-MCNC: 0.81 MG/DL
DEPRECATED RDW RBC AUTO: 48.6 FL (ref 35.1–46.3)
EGFRCR SERPLBLD CKD-EPI 2021: 92 ML/MIN/1.73M2 (ref 60–?)
ERYTHROCYTE [DISTWIDTH] IN BLOOD BY AUTOMATED COUNT: 13.9 % (ref 11–15)
FASTING STATUS PATIENT QL REPORTED: NO
GLOBULIN PLAS-MCNC: 3.1 G/DL (ref 2.8–4.4)
GLUCOSE BLD-MCNC: 96 MG/DL (ref 70–99)
HCT VFR BLD AUTO: 45.7 %
HGB BLD-MCNC: 15.7 G/DL
MCH RBC QN AUTO: 32.3 PG (ref 26–34)
MCHC RBC AUTO-ENTMCNC: 34.4 G/DL (ref 31–37)
MCV RBC AUTO: 94 FL
OSMOLALITY SERPL CALC.SUM OF ELEC: 291 MOSM/KG (ref 275–295)
PLATELET # BLD AUTO: 177 10(3)UL (ref 150–450)
POTASSIUM SERPL-SCNC: 4.2 MMOL/L (ref 3.5–5.1)
PROT SERPL-MCNC: 7 G/DL (ref 5.7–8.2)
RBC # BLD AUTO: 4.86 X10(6)UL
SODIUM SERPL-SCNC: 140 MMOL/L (ref 136–145)
WBC # BLD AUTO: 10.1 X10(3) UL (ref 4–11)

## 2024-03-11 PROCEDURE — 80053 COMPREHEN METABOLIC PANEL: CPT

## 2024-03-11 PROCEDURE — 1170F FXNL STATUS ASSESSED: CPT | Performed by: NURSE PRACTITIONER

## 2024-03-11 PROCEDURE — 3074F SYST BP LT 130 MM HG: CPT | Performed by: NURSE PRACTITIONER

## 2024-03-11 PROCEDURE — 3008F BODY MASS INDEX DOCD: CPT | Performed by: NURSE PRACTITIONER

## 2024-03-11 PROCEDURE — 85027 COMPLETE CBC AUTOMATED: CPT

## 2024-03-11 PROCEDURE — 1159F MED LIST DOCD IN RCRD: CPT | Performed by: NURSE PRACTITIONER

## 2024-03-11 PROCEDURE — 99213 OFFICE O/P EST LOW 20 MIN: CPT | Performed by: NURSE PRACTITIONER

## 2024-03-11 PROCEDURE — 1160F RVW MEDS BY RX/DR IN RCRD: CPT | Performed by: NURSE PRACTITIONER

## 2024-03-11 PROCEDURE — 3078F DIAST BP <80 MM HG: CPT | Performed by: NURSE PRACTITIONER

## 2024-03-11 PROCEDURE — 36415 COLL VENOUS BLD VENIPUNCTURE: CPT

## 2024-03-11 NOTE — PROGRESS NOTES
Enmanuel Lara is a 73 year old male.  Chief Complaint   Patient presents with    Dizziness     And balance issues last week xs 3 days     HPI:   He presents with dizziness that has now resolved. He denies any issues with balance.     He states his symptoms started on Wednesday 2/28. He was walking to the bathroom and bumped into the wall.     He went to an immediate care in [place] on 3/1. He was prescribed Celebrex and a \"little red pill\" for dizziness. He is unsure if he was prescribed meclizine.     He states his symptoms resolved on 3/3. He has not had any further episodes of dizziness.     Current Outpatient Medications   Medication Sig Dispense Refill    celecoxib 200 MG Oral Cap Take 1 capsule (200 mg total) by mouth 2 (two) times daily with meals.      lisinopril 10 MG Oral Tab Take 1 tablet (10 mg total) by mouth daily. 90 tablet 3    simvastatin 20 MG Oral Tab Take 1 tablet (20 mg total) by mouth every evening. 90 tablet 3    pregabalin 75 MG Oral Cap Take 1 capsule (75 mg total) by mouth 2 (two) times daily.      acetaminophen 500 MG Oral Tab Take by mouth.      STIMULANT LAXATIVE 8.6-50 MG Oral Tab Take 1 tablet by mouth 2 (two) times daily as needed.      tiZANidine 2 MG Oral Tab Take 1 tablet (2 mg total) by mouth.      Ascorbic Acid (VITAMIN C) 1000 MG Oral Tab Take 1 tablet (1,000 mg total) by mouth daily.        Past Medical History:   Diagnosis Date    Cataract     Diverticulosis large intestine w/o perforation or abscess w/o bleeding 11/8/2017    High blood pressure     High cholesterol     History of tonsillitis     Tonsillectomy; per NG    Internal hemorrhoids 11/8/2017    MGD (meibomian gland dysfunction) 2011    OU; per NG    Obesity, unspecified     per NG    Other and unspecified hyperlipidemia     per NG    Overweight (BMI 25.0-29.9) 2/21/2017    Pulmonary emphysema (HCC)     PVD (peripheral vascular disease) (HCC)     per NG    S/P colonoscopic polypectomy 11/8/2017    Synovial cyst of  lumbar spine 2018      Past Surgical History:   Procedure Laterality Date    CATARACT EXTRACTION W/  INTRAOCULAR LENS IMPLANT Left 2011    Phaco w/ PC IOL; per NG    CATARACT EXTRACTION W/  INTRAOCULAR LENS IMPLANT Right 2013    Phaco w/ PC IOL; per NG    COLONOSCOPY  2012    repeat in 2017 fall    COLONOSCOPY N/A 2017    Procedure: COLONOSCOPY;  Surgeon: Abbie Murphy MD;  Location: [facility] ENDO    COLONOSCOPY N/A 2021    Procedure: COLONOSCOPY;  Surgeon: Estiven Alfonso MD;  Location: [facility] ENDOSCOPY    ELECTROCARDIOGRAM, COMPLETE  10-    SCANNED TO MEDIA TAB: 10-    TONSILLECTOMY      per NG      Social History:  Social History     Socioeconomic History    Marital status:    Tobacco Use    Smoking status: Former     Packs/day: 1.00     Years: 35.00     Additional pack years: 0.00     Total pack years: 35.00     Types: Cigarettes     Quit date:      Years since quittin.    Smokeless tobacco: Former    Tobacco comments:     Quit cigs in , less than once a month(cigs)   Vaping Use    Vaping Use: Never used   Substance and Sexual Activity    Alcohol use: Yes     Alcohol/week: 0.0 standard drinks of alcohol     Comment: 1-2 drinks     Drug use: No   Other Topics Concern    Caffeine Concern Yes     Comment: Soda, QOD; per NG    Exercise No   Social History Narrative    The patient does not use an assistive device..      The patient does live in a home with stairs.     Social Determinants of Health     Financial Resource Strain: Low Risk  (3/20/2023)    Financial Resource Strain     Difficulty of Paying Living Expenses: Not very hard     Med Affordability: No   Food Insecurity: No Food Insecurity (3/20/2023)    Food Insecurity     Food Insecurity: Never true   Transportation Needs: No Transportation Needs (3/20/2023)    Transportation Needs     Lack of Transportation: No   Physical Activity: Insufficiently Active (2024)    Exercise  Vital Sign     Days of Exercise per Week: 3 days     Minutes of Exercise per Session: 20 min   Stress: No Stress Concern Present (3/20/2023)    Stress     Feeling of Stress : No   Social Connections: Socially Integrated (3/20/2023)    Social Connections     Frequency of Socialization with Friends and Family: 2   Housing Stability: Low Risk  (3/20/2023)    Housing Stability     Housing Instability: No      Family History   Problem Relation Age of Onset    Cancer Father 53        Lung cancer, Cause of death; per NG    Dementia Mother         per NG    Neurological Disorder Mother 81        Alzheimer's disease, Cause of death; per NG    Diabetes Neg     Glaucoma Neg     Macular degeneration Neg     Retinal detachment Neg     Lipids Neg       No Known Allergies     REVIEW OF SYSTEMS:     Review of Systems   Constitutional:  Negative for fever.   HENT: Negative.     Respiratory:  Negative for cough, shortness of breath and wheezing.    Cardiovascular:  Negative for chest pain.   Gastrointestinal: Negative.    Genitourinary: Negative.    Musculoskeletal:  Positive for back pain.   Skin: Negative.    Neurological: Negative.    Psychiatric/Behavioral: Negative.        Wt Readings from Last 5 Encounters:   03/11/24 204 lb (92.5 kg)   10/16/23 203 lb (92.1 kg)   07/17/23 217 lb (98.4 kg)   04/17/23 212 lb (96.2 kg)   11/03/22 212 lb (96.2 kg)     Body mass index is 31.02 kg/m².      EXAM:   /70   Pulse 97   Resp 16   Ht 5' 8\" (1.727 m)   Wt 204 lb (92.5 kg)   BMI 31.02 kg/m²     Physical Exam  Vitals reviewed.   Constitutional:       Appearance: Normal appearance.   HENT:      Head: Normocephalic.      Right Ear: Tympanic membrane normal.      Left Ear: Tympanic membrane normal.   Cardiovascular:      Rate and Rhythm: Normal rate and regular rhythm.      Pulses: Normal pulses.   Pulmonary:      Breath sounds: Normal breath sounds. No wheezing.   Musculoskeletal:         General: No swelling. Normal range of motion.    Skin:     General: Skin is warm and dry.   Neurological:      Mental Status: He is alert and oriented to person, place, and time.   Psychiatric:         Mood and Affect: Mood normal.         Behavior: Behavior normal.            ASSESSMENT AND PLAN:   1. Dizziness  - resolved  - will check blood work for any abnormalities.   - Comp Metabolic Panel (14); Future  - CBC, Platelet; No Differential; Future    2. Essential hypertension with goal blood pressure less than 140/90  - CPM  - BP stable in office.       The patient indicates understanding of these issues and agrees to the plan.  Return for if symptoms do not resolve.

## 2024-04-05 ENCOUNTER — TELEPHONE (OUTPATIENT)
Facility: CLINIC | Age: 74
End: 2024-04-05

## 2024-04-05 NOTE — TELEPHONE ENCOUNTER
Patient outreach message received:    3 year colonoscopy recall entered into patient outreach in [facility]. Patient will be due for next colonoscopy 7/27/2024.     Recall reminder letter mailed out to patient.

## 2024-04-15 ENCOUNTER — OFFICE VISIT (OUTPATIENT)
Dept: INTERNAL MEDICINE CLINIC | Facility: CLINIC | Age: 74
End: 2024-04-15
Payer: COMMERCIAL

## 2024-04-15 VITALS
DIASTOLIC BLOOD PRESSURE: 68 MMHG | HEIGHT: 68 IN | WEIGHT: 204 LBS | HEART RATE: 92 BPM | SYSTOLIC BLOOD PRESSURE: 107 MMHG | BODY MASS INDEX: 30.92 KG/M2 | OXYGEN SATURATION: 96 %

## 2024-04-15 DIAGNOSIS — G89.29 CHRONIC BILATERAL LOW BACK PAIN WITH LEFT-SIDED SCIATICA: ICD-10-CM

## 2024-04-15 DIAGNOSIS — R73.03 PREDIABETES: ICD-10-CM

## 2024-04-15 DIAGNOSIS — F17.210 CIGARETTE NICOTINE DEPENDENCE WITHOUT COMPLICATION: ICD-10-CM

## 2024-04-15 DIAGNOSIS — I70.0 ATHEROSCLEROSIS OF AORTA (HCC): ICD-10-CM

## 2024-04-15 DIAGNOSIS — J43.1 PANLOBULAR EMPHYSEMA (HCC): ICD-10-CM

## 2024-04-15 DIAGNOSIS — K64.8 INTERNAL HEMORRHOIDS: ICD-10-CM

## 2024-04-15 DIAGNOSIS — E66.09 CLASS 1 OBESITY DUE TO EXCESS CALORIES WITHOUT SERIOUS COMORBIDITY WITH BODY MASS INDEX (BMI) OF 31.0 TO 31.9 IN ADULT: ICD-10-CM

## 2024-04-15 DIAGNOSIS — M54.16 SPINAL STENOSIS OF LUMBAR REGION WITH RADICULOPATHY: ICD-10-CM

## 2024-04-15 DIAGNOSIS — E78.2 MIXED HYPERLIPIDEMIA: ICD-10-CM

## 2024-04-15 DIAGNOSIS — M54.42 CHRONIC BILATERAL LOW BACK PAIN WITH LEFT-SIDED SCIATICA: ICD-10-CM

## 2024-04-15 DIAGNOSIS — I10 ESSENTIAL HYPERTENSION WITH GOAL BLOOD PRESSURE LESS THAN 140/90: ICD-10-CM

## 2024-04-15 DIAGNOSIS — J44.9 CHRONIC OBSTRUCTIVE PULMONARY DISEASE, UNSPECIFIED COPD TYPE (HCC): Primary | ICD-10-CM

## 2024-04-15 DIAGNOSIS — M48.061 SPINAL STENOSIS OF LUMBAR REGION WITH RADICULOPATHY: ICD-10-CM

## 2024-04-15 DIAGNOSIS — Z12.5 SCREENING PSA (PROSTATE SPECIFIC ANTIGEN): ICD-10-CM

## 2024-04-15 PROBLEM — M54.9 BACK PAIN: Status: RESOLVED | Noted: 2024-04-15 | Resolved: 2024-04-15

## 2024-04-15 PROBLEM — M54.9 BACK PAIN: Status: ACTIVE | Noted: 2024-04-15

## 2024-04-16 NOTE — PROGRESS NOTES
Subjective:   Enmanuel Lara is a 73 year old male who presents for a MA (Medicare Advantage) Supervisit (Once per calendar year) and scheduled follow up of multiple significant but stable problems.   73-year-old gentleman here for medical [facility] super visit.  He report that he is doing well.  Continues to have back pain and has been followed at the pain clinic.  Fall prevention discussed.    History/Other:   Fall Risk Assessment:   He has been screened for Falls and is High Risk. Fall Prevention information provided to patient in After Visit Summary.    Do you feel unsteady when standing or walking?: No  Do you worry about falling?: No  Have you fallen in the past year?: Yes  How many times have you fallen?: (P) 1  Were you injured?: (P) No     Cognitive Assessment:   He had a completely normal cognitive assessment - see flowsheet entries     Functional Ability/Status:   Enmanuel Lara has a completely normal functional assessment. See flowsheet for details.      Depression Screening (PHQ-2/PHQ-9): PHQ-2 SCORE: 0  , done 4/15/2024   If you checked off any problems, how difficult have these problems made it for you to do your work, take care of things at home, or get along with other people?: Not difficult at all         <5 minutes spent screening and counseling for depression    Advanced Directives:   He does NOT have a Living Will. [ ]  He does have a POA but we do NOT have it on file in Epic.    Discussed Advance Care Planning with patient (and family/surrogate if present). Standard forms made available to patient in After Visit Summary.      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    Current Medications:  Outpatient Medications Marked as Taking for the 4/15/24 encounter (Office Visit) with Betito Garcia MD   Medication Sig    celecoxib 200 MG Oral Cap Take 1 capsule (200 mg total) by mouth 2 (two) times daily with meals.    lisinopril 10 MG Oral Tab Take 1 tablet (10 mg total) by mouth daily.    simvastatin 20 MG Oral Tab Take 1 tablet (20 mg total) by mouth every evening.    acetaminophen 500 MG Oral Tab Take by mouth.    Ascorbic Acid (VITAMIN C) 1000 MG Oral Tab Take 1 tablet (1,000 mg total) by mouth daily.       Medical History:  He  has a past medical history of Cataract, Diverticulosis large intestine w/o perforation or abscess w/o bleeding (11/8/2017), High blood pressure, High cholesterol, History of tonsillitis, Internal hemorrhoids (11/8/2017), MGD (meibomian gland dysfunction) (2011), Obesity, unspecified, Other and unspecified hyperlipidemia, Overweight (BMI 25.0-29.9) (2/21/2017), Pulmonary emphysema (HCC), PVD (peripheral vascular disease) ([facility]), S/P colonoscopic polypectomy (11/8/2017), and Synovial cyst of lumbar spine (8/20/2018).  Surgical History:  He  has a past surgical history that includes Cataract extraction w/  intraocular lens implant (Left, 02-); Cataract extraction w/  intraocular lens implant (Right, 11-); electrocardiogram, complete (10-); tonsillectomy; colonoscopy (11/1/2012); colonoscopy (N/A, 11/8/2017); and colonoscopy (N/A, 7/27/2021).   Family History:  His family history includes  Cancer (age of onset: 53) in his father; Dementia in his mother; Neurological Disorder (age of onset: 81) in his mother.  Social History:  He  reports that he quit smoking about 11 years ago. His smoking use included cigarettes. He started smoking about 46 years ago. He has a 35 pack-year smoking history. He has quit using smokeless tobacco. He reports current alcohol use. He reports that he does not use drugs.    Tobacco:  He smoked tobacco in the past but quit greater than 12 months ago.  Social History     Tobacco Use   Smoking Status Former    Current packs/day: 0.00    Average packs/day: 1 pack/day for 35.0 years (35.0 ttl pk-yrs)    Types: Cigarettes    Start date:     Quit date:     Years since quittin.2   Smokeless Tobacco Former   Tobacco Comments    Quit cigs in , less than once a month(cigs)        CAGE Alcohol Screen:   CAGE screening score of 0 on 10/16/2023, showing low risk of alcohol abuse.      Patient Care Team:  Betito Garcia MD as PCP - General (Internal Medicine)  Jerry De Souza MD (SURGERY, ORTHOPEDIC)  Narendra Vega DO (Physical Medicine)  Jeffrey Austin PT as Physical Therapist (Physical Therapy)  Latonia Christianson PTA as Physical Therapist (Physical Therapy)  Rosanna Gregory CMA as [facility] Care Manager  Fahad Montgomery MD (GASTROENTEROLOGY)  Vikash Owen MD (SURGERY, ORTHOPEDIC)  Miles Lehman, APN-CNP as Nurse Practitioner (PAIN MANAGEMENT)    Review of Systems   Constitutional:  Negative for activity change, appetite change and fever.   HENT:  Negative for congestion and voice change.    Respiratory:  Negative for cough and shortness of breath.    Cardiovascular:  Negative for chest pain.   Gastrointestinal:  Negative for abdominal distention, abdominal pain and vomiting.   Genitourinary:  Negative for hematuria.   Musculoskeletal:  Positive for arthralgias and back pain.   Skin:  Negative for wound.   Psychiatric/Behavioral:  Negative for behavioral  problems.          Objective:   Physical Exam  Constitutional:       Appearance: Normal appearance.   HENT:      Head: Normocephalic.   Eyes:      Conjunctiva/sclera: Conjunctivae normal.   Cardiovascular:      Rate and Rhythm: Normal rate and regular rhythm.      Heart sounds: Normal heart sounds. No murmur heard.  Pulmonary:      Effort: Pulmonary effort is normal.      Breath sounds: Normal breath sounds. No rhonchi or rales.   Abdominal:      General: Bowel sounds are normal.      Palpations: Abdomen is soft.      Tenderness: There is no abdominal tenderness.   Musculoskeletal:      Cervical back: Neck supple.      Right lower leg: No edema.      Left lower leg: No edema.   Skin:     General: Skin is warm and dry.   Neurological:      General: No focal deficit present.      Mental Status: He is alert and oriented to person, place, and time. Mental status is at baseline.   Psychiatric:         Mood and Affect: Mood normal.         Behavior: Behavior normal.         /68   Pulse 92   Ht 5' 8\" (1.727 m)   Wt 204 lb (92.5 kg)   SpO2 96%   BMI 31.02 kg/m²  Estimated body mass index is 31.02 kg/m² as calculated from the following:    Height as of this encounter: 5' 8\" (1.727 m).    Weight as of this encounter: 204 lb (92.5 kg).    Medicare Hearing Assessment:   Hearing Screening    Screening Method: Questionnaire  I have a problem hearing over the telephone: No I have trouble following the conversations when two or more people are talking at the same time: No   I have trouble understanding things on the TV: No I have to strain to understand conversations: No   I have to worry about missing the telephone ring or doorbell: No I have trouble hearing conversations in a noisy background such as a crowded room or restaurant: No   I get confused about where sounds come from: No I misunderstand some words in a sentence and need to ask people to repeat themselves: No   Many people I talk to seem to mumble (or don't  speak clearly): No People get annoyed because I misunderstand what they say: No   I misunderstand what others are saying and make inappropriate responses: No I avoid social activities because I cannot hear well and fear I will reply improperly: No   Family members and friends have told me they think I may have hearing loss: No             Visual Acuity:   Right Eye Visual Acuity: Corrected Right Eye Chart Acuity: 20/40   Left Eye Visual Acuity: Corrected Left Eye Chart Acuity: 20/20   Both Eyes Visual Acuity: Corrected Both Eyes Chart Acuity: 20/20   Able To Tolerate Visual Acuity: Yes        Assessment & Plan:   Enmanuel Lara is a 73 year old male who presents for a Medicare Assessment.     1. Chronic obstructive pulmonary disease, unspecified COPD type (HCC) (Primary) smoking cessation discussed.  2. Panlobular emphysema (HCC) smoking cessation discussed.  3. Atherosclerosis of aorta (HCC) continue statins  4. Mixed hyperlipidemia continue statins  -     Lipid Panel; Future; Expected date: 04/15/2024  -     TSH W Reflex To Free T4; Future; Expected date: 04/15/2024  5. Essential hypertension with goal blood pressure less than 140/90 blood pressure controlled  6. Class 1 obesity due to excess calories without serious comorbidity with body mass index (BMI) of 31.0 to 31.9 in adult encourage weight loss  7. Spinal stenosis of lumbar region with radiculopathy follows up with pain clinic  8. Prediabetes monitor A1c her blood sugars  9. Internal hemorrhoids stable  10. Cigarette nicotine dependence without complication  Discussed again regarding lung cancer screening.  He declined.  Smoking cessation discussed.  Overview:  Declined lung cancer screening  11. Chronic bilateral low back pain with left-sided sciatica follows with the pain clinic  12. Screening PSA (prostate specific antigen)  -     PSA Total, Screen; Future; Expected date: 04/15/2024    The patient indicates understanding of these issues and agrees to the  plan.  Reinforced healthy diet, lifestyle, and exercise.      No follow-ups on file.     Betito Garcia MD, 4/15/2024     Supplementary Documentation:   General Health:  In the past six months, have you lost more than 10 pounds without trying?: 2 - No  Has your appetite been poor?: No  Type of Diet: Balanced  How does the patient maintain a good energy level?: Appropriate Exercise;Daily Walks  How would you describe your daily physical activity?: Moderate  How would you describe your current health state?: Good  How do you maintain positive mental well-being?: Social Interaction  On a scale of 0 to 10, with 0 being no pain and 10 being severe pain, what is your pain level?: 2 - (Mild)  In the past six months, have you experienced urine leakage?: 0-No  At any time do you feel concerned for the safety/well-being of yourself and/or your children, in your home or elsewhere?: No  Have you had any immunizations at another office such as Influenza, Hepatitis B, Tetanus, or Pneumococcal?: No        Enmanuel Lara's SCREENING SCHEDULE   Tests on this list are recommended by your physician but may not be covered, or covered at this frequency, by your insurer.   Please check with your insurance carrier before scheduling to verify coverage.   PREVENTATIVE SERVICES FREQUENCY &  COVERAGE DETAILS LAST COMPLETION DATE   Diabetes Screening    Fasting Blood Sugar / Glucose    One screening every 12 months if never tested or if previously tested but not diagnosed with pre-diabetes   One screening every 6 months if diagnosed with pre-diabetes Lab Results   Component Value Date    GLU 96 03/11/2024        Cardiovascular Disease Screening    Lipid Panel  Cholesterol  Lipoprotein (HDL)  Triglycerides Covered every 5 years for all Medicare beneficiaries without apparent signs or symptoms of cardiovascular disease Lab Results   Component Value Date    CHOLEST 90 12/19/2022    HDL 45 12/19/2022    LDL 28 12/19/2022    TRIG 87 12/19/2022          Electrocardiogram (EKG)   Covered if needed at Welcome to Medicare, and non-screening if indicated for medical reasons 07/19/2023      Ultrasound Screening for Abdominal Aortic Aneurysm (AAA) Covered once in a lifetime for one of the following risk factors    Men who are 65-75 years old and have ever smoked    Anyone with a family history -     Colorectal Cancer Screening  Covered for ages 50-85; only need ONE of the following:    Colonoscopy   Covered every 10 years    Covered every 2 years if patient is at high risk or previous colonoscopy was abnormal 07/27/2021    Health Maintenance   Topic Date Due    Colorectal Cancer Screening  07/27/2024       Flexible Sigmoidoscopy   Covered every 4 years -    Fecal Occult Blood Test Covered annually -   Prostate Cancer Screening    Prostate-Specific Antigen (PSA) Annually Lab Results   Component Value Date    PSA 0.8 01/03/2019     Health Maintenance   Topic Date Due    PSA  12/19/2024      Immunizations    Influenza Covered once per flu season  Please get every year 10/16/2023  No recommendations at this time    Pneumococcal Each vaccine ([id number] & [id number]) covered once after 65 Prevnar 13: 10/04/2021    Trrgvxxbh81: 06/23/2020     No recommendations at this time    Hepatitis B One screening covered for patients with certain risk factors   -  No recommendations at this time    Tetanus Toxoid Not covered by Medicare Part B unless medically necessary (cut with metal); may be covered with your pharmacy prescription benefits -    Tetanus, Diptheria and Pertusis TD and TDaP Not covered by Medicare Part B -  No recommendations at this time    Zoster Not covered by Medicare Part B; may be covered with your pharmacy  prescription benefits 10/05/2015  No recommendations at this time     Annual Monitoring of Persistent Medications (ACE/ARB, digoxin diuretics, anticonvulsants)    Potassium Annually Lab Results   Component Value Date    K 4.2 03/11/2024         Creatinine    Annually Lab Results   Component Value Date    CREATSERUM 0.81 03/11/2024         BUN Annually Lab Results   Component Value Date    BUN 17 03/11/2024       Drug Serum Conc Annually No results found for: \"DIGOXIN\", \"DIG\", \"VALP\"           Chronic Obstructive Pulmonary Disease (COPD)    Spirometry Annually Spirometry date:

## 2024-04-23 ENCOUNTER — PATIENT OUTREACH (OUTPATIENT)
Dept: CASE MANAGEMENT | Age: 74
End: 2024-04-23

## 2024-04-23 DIAGNOSIS — M48.061 SPINAL STENOSIS OF LUMBAR REGION WITH RADICULOPATHY: ICD-10-CM

## 2024-04-23 DIAGNOSIS — I10 ESSENTIAL HYPERTENSION WITH GOAL BLOOD PRESSURE LESS THAN 140/90: ICD-10-CM

## 2024-04-23 DIAGNOSIS — M54.42 CHRONIC BILATERAL LOW BACK PAIN WITH LEFT-SIDED SCIATICA: ICD-10-CM

## 2024-04-23 DIAGNOSIS — E78.2 MIXED HYPERLIPIDEMIA: Primary | ICD-10-CM

## 2024-04-23 DIAGNOSIS — M43.10 DEGENERATIVE SPONDYLOLISTHESIS: ICD-10-CM

## 2024-04-23 DIAGNOSIS — M54.16 SPINAL STENOSIS OF LUMBAR REGION WITH RADICULOPATHY: ICD-10-CM

## 2024-04-23 DIAGNOSIS — R73.03 PREDIABETES: ICD-10-CM

## 2024-04-23 DIAGNOSIS — G89.29 CHRONIC BILATERAL LOW BACK PAIN WITH LEFT-SIDED SCIATICA: ICD-10-CM

## 2024-04-23 NOTE — PROGRESS NOTES
Spoke to Enmanuel for [facility].      Updates to patient care team/comments: None  Patient reported changes in medications: He reports he is not taking Acetaminophen.      He stopped Celebrex for two weeks, however noted pain, has since resumed it last week.     Med Adherence  Comment: Pt confirms he is taking medications as instructed by providers.     Health Maintenance:   Health Maintenance   Topic Date Due    Lung Cancer Screening  Never done The patient reports speaking with Dr. Garcia, he decided he will do the scan once he feels ready. At this time he not ready.     Colorectal Cancer Screening  07/27/2024    Zoster Vaccines (2 of 3) 12/14/2024 (Originally 11/30/2015)    COVID-19 Vaccine (5 - 2023-24 season) 12/14/2024 (Originally 9/1/2023)    PSA  12/19/2024    Influenza Vaccine  Completed    MA Annual Health Assessment  Completed    Annual Depression Screening  Completed    Fall Risk Screening (Annual)  Completed    Pneumococcal Vaccine: 65+ Years  Completed     Patient updates/concerns:    The patient reports chronic back pain and let leg numbness continue to be his biggest concern. He states he is not able to stand for extended periods of time, after standing for five minutes he has to find a place to sit down at. However, he is able to walk without much discomfort. Reports walking daily for approximately 1-1.2 miles. He sees Dr. Lazaro out of [facility] for management.    Goals/Action Plan:    Active goal from previous outreach:   Complete PT and lose 5 lbs get down to 198 lbs               Where/when/how: By eating healthier, cutting     Patient reported progress towards goals:                - What: He remains committed to achieving his goal.            - Where/When/How: The patient reports completing physical therapy, he is consistent about keeping up with exercises on his own. He alternates the days he works on PT exercises. In addition he walks daily, reports he has not missed a day in the past three weeks.  Averages 1-1.2 miles daily.  Patient Reported Barriers and Concerns: He wakes up feeling very stiff in the morning. Left leg numbness.                   - Plan for overcoming barriers: He will do stretches daily and continue walking to alleviate symtoms. Additionally, will be seeing Dr. Owen next month and will discuss symtoms.     Care Managers Interventions:     I reviewed the test results below with patient. Additionally, discussed pending blood work orders. The patient verbalized understanding.  Hi Enmanuel,     Your kidney function and liver enzymes are normal.  Your blood count is normal, no anemia.  Yolie PICHARDO   Written by NANCI Mccarthy on 3/11/2024 11:12 PM CDT  Seen by patient Enmanuel Lara on 4/8/2024  8:00 PM    Care every where updated    Fall risk and depression screen updated.    Social determinants of health updated.    Provided support. Encouraged patient to call as needed.    Reviewed Future Appointments:   Future Appointments   Date Time Provider Department Center   10/14/2024  5:30 PM Betito Garcia MD [facility]     Next Care Manager Follow Up Date: One month. Encouraged patient to call as needed.    Reason For Follow Up: review progress and or barriers towards patient's goals.     Time Spent This Encounter Total: 37  min medical record review, telephone communication, care plan updates where needed, education, goals, and action plan recreation/update. Provided acknowledgment and validation to patient's concerns.   Monthly Minute Total including today: 37  Physical assessment, complete health history, and need for CCM established by Betito Garcia MD.

## 2024-05-14 ENCOUNTER — PATIENT OUTREACH (OUTPATIENT)
Dept: CASE MANAGEMENT | Age: 74
End: 2024-05-14

## 2024-05-14 ENCOUNTER — TELEPHONE (OUTPATIENT)
Facility: CLINIC | Age: 74
End: 2024-05-14

## 2024-05-14 DIAGNOSIS — Z86.010 PERSONAL HISTORY OF COLONIC POLYPS: Primary | ICD-10-CM

## 2024-05-14 DIAGNOSIS — E66.09 CLASS 1 OBESITY DUE TO EXCESS CALORIES WITHOUT SERIOUS COMORBIDITY WITH BODY MASS INDEX (BMI) OF 31.0 TO 31.9 IN ADULT: ICD-10-CM

## 2024-05-14 DIAGNOSIS — E78.2 MIXED HYPERLIPIDEMIA: ICD-10-CM

## 2024-05-14 DIAGNOSIS — M43.10 DEGENERATIVE SPONDYLOLISTHESIS: Primary | ICD-10-CM

## 2024-05-14 DIAGNOSIS — R73.03 PREDIABETES: ICD-10-CM

## 2024-05-14 DIAGNOSIS — I10 ESSENTIAL HYPERTENSION WITH GOAL BLOOD PRESSURE LESS THAN 140/90: ICD-10-CM

## 2024-05-14 RX ORDER — TRAMADOL HYDROCHLORIDE 50 MG/1
TABLET ORAL
COMMUNITY
Start: 2024-05-01

## 2024-05-14 NOTE — PROGRESS NOTES
Spoke to Enmanuel for CCM.      Updates to patient care team/comments: None   Patient reported changes in medications: Dr. Patiño started him on Tramadol and that he only uses it PRN.  Reconciled from outside records.     Med Adherence  Comment: Pt confirms he is taking medications as instructed by providers.     Health Maintenance:   Health Maintenance   Topic Date Due    Lung Cancer Screening  Never done (Declined)    Colorectal Cancer Screening  07/27/2024 Patient was reminded about colonosocoy and will be reaching out to GI office to set up his appointment    Zoster Vaccines (2 of 3) 12/14/2024 (Originally 11/30/2015)    COVID-19 Vaccine (5 - 2023-24 season) 12/14/2024 (Originally 9/1/2023)    PSA  12/19/2024    Influenza Vaccine  Completed    MA Annual Health Assessment  Completed    Annual Depression Screening  Completed    Fall Risk Screening (Annual)  Completed    Pneumococcal Vaccine: 65+ Years  Completed     Patient updates/concerns:     Patient reports that he will be stop working and is not planning to find another job as of now. In addition patient reported that he was seen by Dr Patiño (ortho) and was advised to continue physical therapy at home due to his pain. Patient is unsure if he should resume outpatient therapy due to insurance coverage.    Goals/Action Plan:    Active goal from previous outreach:     Patient reported progress towards goals: Yes               - What: strengthen            - Where/When/How: home physical therapy  Patient Reported Barriers and Concerns: yes                   - Plan for overcoming barriers: physical therapy    Care Managers Interventions: I informed patient to get re evaluated by Physical Therapy to see if he qualifies to start outpatient therapy again, he agreed to the plan.  Patient made aware he has pending order to get labs done and will be getting them fasting sometime soon.    Encouraged three balanced meals along with 20-30 minutes of daily exercise and stretching  and to continue with home physical exercises that were recommended by his therapist after his surgery.    Care every where updated    Provided support. Encouraged patient to call as needed.    Reviewed Future Appointments:   Future Appointments   Date Time Provider Department Center   10/14/2024  5:30 PM Betito Garcia MD [facility]     Next Care Manager Follow Up Date: One month. Encouraged patient to call as needed.    Reason For Follow Up: review progress and or barriers towards patient's goals.     Time Spent This Encounter Total:  24 min medical record review, telephone communication, care plan updates where needed, education, goals, and action plan recreation/update. Provided acknowledgment and validation to patient's concerns.   Monthly Minute Total including today:  24  Physical assessment, complete health history, and need for CCM established by Betito Garcia MD.

## 2024-05-15 RX ORDER — SODIUM, POTASSIUM,MAG SULFATES 17.5-3.13G
SOLUTION, RECONSTITUTED, ORAL ORAL
Qty: 1 EACH | Refills: 0 | Status: SHIPPED | OUTPATIENT
Start: 2024-05-15

## 2024-05-15 NOTE — TELEPHONE ENCOUNTER
May schedule a colonoscopy for a personal history of colon polyps following a split dose Suprep and monitored anesthesia care.

## 2024-05-15 NOTE — TELEPHONE ENCOUNTER
Last Procedure, Date, MD:  colonoscopy, 7/27/21, Dr. Alfonso  Last Diagnosis:  Colon polyps, nearly pancolonic diverticulosis  Recalled (mth/yrs): 3 years  Sedation Used Previously:  Mac  Last Prep Used (if known):  Suprep  Quality Of Prep (if known): good  Anticoagulants: None   Diabetic Med's (PO/Injectables): None  Weight loss Med's: none   Iron/Herbal/Multivitamin Supplements (RX/OTC): Multivitamin   Marijuana/Vaping/CBD: None  Height & Weight: 5' 8' and 204lb  BMI:  Hx of Cardiac/CVA Issues/(MI/Stroke): none  Devices Pacemaker/Defibrillator/Stents: None  Respiratory Issues/Oxygen Use/ARMIDA/COPD: none  Issues w/ Anesthesia: None    Symptoms (Y/N): None  Symptoms Details:     Special Comments/Notes:    Please advise on orders and prep.     Thank you!       : Estiven Alfonso MD (Physician)   [facility] Endoscopy Report        Date of Procedure:  07/27/21        Preoperative Diagnosis:  Personal history of adenomatous colon polyps        Postoperative Diagnosis:  1.  Colon polyps  2.  Nearly pancolonic diverticulosis        Procedure:    Colonoscopy with polypectomy        Surgeon:  Estiven Alfonso M.D.        Anesthesia:  Monitored anesthesia care  Cecal withdrawal time: 26 minutes  EBL:  Insignificant        Brief History:  This is a 70 year old male who presents for a surveillance colonoscopy in the setting of a history of multiple adenomatous polyps removed endoscopically almost 4 years prior.  The patient has had no new lower gastrointestinal tract symptoms or signs.        Technique:  After informed consent, the patient was placed in the left lateral recumbent position.  Digital rectal examination revealed no palpable intraluminal abnormalities.  An Olympus variable stiffness 190 series HD colonoscope was inserted into the rectum and advanced under direct vision by following the lumen to the terminal ileum.  The colon was examined upon withdrawal in the left lateral  recumbent position.       Findings:  The preparation of the colon was good.  The terminal ileum was examined for 5 cm and visually normal.  The ileocecal valve was well preserved. The visualized colonic mucosa from the cecum to the anal verge was normal with an intact vascular pattern.  There were #8 polyps seen within the colon which removed as follows:     1.  In the cecum and proximal/mid ascending colon there were #3 polyps measuring 3-5 mm each in size.  These were cold snare excised and retrieved.  2.  In the transverse colon there were #4 polyps measuring 4-5 mm each in size.  These were cold snare excised and retrieved.  3.  In the sigmoid colon there was a 3 mm sessile polyp which was cold snare excised and retrieved.     Inspection of all sites revealed no evidence of ongoing bleeding.  There were scattered diverticula seen from at least the proximal transverse colon to the sigmoid most numerous in the latter without current signs of complication.  There were no other colonic polyps, mass lesions, vascular anomalies or signs of inflammation seen.  Retroflexion in the rectum revealed no abnormalities.  The procedure was well tolerated without immediate complication.        Impression:  1.  Multiple colon polyps  2.  Nearly pancolonic diverticulosis, currently uncomplicated     Recommendations:  1.  High-fiber diet.  2.  Follow-up biopsy results.  3.  Further recommendations pending biopsy and clinical course.           Estiven Alfonso MD  7/27/2021

## 2024-05-17 ENCOUNTER — TELEPHONE (OUTPATIENT)
Facility: CLINIC | Age: 74
End: 2024-05-17

## 2024-05-17 NOTE — TELEPHONE ENCOUNTER
Patient calling to schedule colonoscopy, informed of the 5 business day turnaround, please call at [phone],thanks.

## 2024-05-24 NOTE — TELEPHONE ENCOUNTER
Scheduled for:  Colonoscopy 82168  Provider Name:  Dr. Alfonso  Date:  8/13/2024  Location:  [facility]  Sedation:  MAC  Time:  8:15am, (pt is aware to arrive at 7:15am)   Prep:  Suprep  Meds/Allergies Reconciled?:  Physician reviewed     Diagnosis with codes:  Hx of colon polyps Z86.010  Was patient informed to call insurance with codes (Y/N):  Yes, I confirmed MEDICARE insurance with this patient.      Referral sent?:  Referral was sent at the time of electronic surgical scheduling.   [facility] or [facility] notified?:  I sent an electronic request to Endo Scheduling and received a confirmation today.      Medication Orders:  n/a  Misc Orders:  n/a     Further instructions given by staff:   I discussed the prep instructions with the patient which he verbally understood and is aware that I will mail the instructions today.

## 2024-06-03 ENCOUNTER — LAB ENCOUNTER (OUTPATIENT)
Dept: LAB | Age: 74
End: 2024-06-03
Attending: INTERNAL MEDICINE
Payer: MEDICARE

## 2024-06-03 DIAGNOSIS — Z12.5 SCREENING PSA (PROSTATE SPECIFIC ANTIGEN): ICD-10-CM

## 2024-06-03 DIAGNOSIS — E78.2 MIXED HYPERLIPIDEMIA: ICD-10-CM

## 2024-06-03 LAB
CHOLEST SERPL-MCNC: 100 MG/DL (ref ?–200)
COMPLEXED PSA SERPL-MCNC: 1.04 NG/ML (ref ?–4)
FASTING PATIENT LIPID ANSWER: YES
HDLC SERPL-MCNC: 42 MG/DL (ref 40–59)
LDLC SERPL CALC-MCNC: 36 MG/DL (ref ?–100)
NONHDLC SERPL-MCNC: 58 MG/DL (ref ?–130)
TRIGL SERPL-MCNC: 123 MG/DL (ref 30–149)
TSI SER-ACNC: 1.96 MIU/ML (ref 0.55–4.78)
VLDLC SERPL CALC-MCNC: 17 MG/DL (ref 0–30)

## 2024-06-03 PROCEDURE — 80061 LIPID PANEL: CPT

## 2024-06-03 PROCEDURE — 36415 COLL VENOUS BLD VENIPUNCTURE: CPT

## 2024-06-03 PROCEDURE — 84443 ASSAY THYROID STIM HORMONE: CPT

## 2024-06-18 ENCOUNTER — PATIENT OUTREACH (OUTPATIENT)
Dept: CASE MANAGEMENT | Age: 74
End: 2024-06-18

## 2024-06-18 DIAGNOSIS — M48.061 SPINAL STENOSIS OF LUMBAR REGION WITH RADICULOPATHY: ICD-10-CM

## 2024-06-18 DIAGNOSIS — M43.10 DEGENERATIVE SPONDYLOLISTHESIS: ICD-10-CM

## 2024-06-18 DIAGNOSIS — M54.16 SPINAL STENOSIS OF LUMBAR REGION WITH RADICULOPATHY: ICD-10-CM

## 2024-06-18 DIAGNOSIS — I10 ESSENTIAL HYPERTENSION WITH GOAL BLOOD PRESSURE LESS THAN 140/90: ICD-10-CM

## 2024-06-18 DIAGNOSIS — I70.0 ATHEROSCLEROSIS OF AORTA (HCC): Primary | ICD-10-CM

## 2024-06-18 DIAGNOSIS — R73.03 PREDIABETES: ICD-10-CM

## 2024-06-18 DIAGNOSIS — E78.2 MIXED HYPERLIPIDEMIA: ICD-10-CM

## 2024-06-18 NOTE — PROGRESS NOTES
Spoke to Enmanuel for CCM.      Updates to patient care team/comments: None   Patient reported changes in medications: None     Med Adherence  Comment: Pt confirms he is taking medications as instructed by providers.     Health Maintenance:   Health Maintenance   Topic Date Due    Colorectal Cancer Screening  07/27/2024    Zoster Vaccines (2 of 3) 12/14/2024 (Originally 11/30/2015)    COVID-19 Vaccine (5 - 2023-24 season) 12/14/2024 (Originally 9/1/2023)    Lung Cancer Screening  05/14/2025 (Originally 12/14/2003)    PSA  06/03/2026    Influenza Vaccine  Completed    MA Annual Health Assessment  Completed    Annual Depression Screening  Completed    Fall Risk Screening (Annual)  Completed    Pneumococcal Vaccine: 65+ Years  Completed     Patient updates/concerns:     The patient reports that his back is not getting any better. He is starting to feel very discouraged and feels like he is going to have to live the rest of his life with nagging back pain. He reports standing for more than five minutes increases the pain. However, he notes that since the surgery, his posture has improved, he is walking more upright, and he can walk for longer distances.     He is also feeling discouraged because he has been trying to obtain [facility] and has been denied some.     He says he is trying to stay busy by volunteering at his local [employer].    He continues going on walks every day for 1.5 miles. Because of the weather, he has been trying to go for walks very early in them mornings. .  Goals/Action Plan:    Active goal from previous outreach:   Complete PT and lose 5 lbs get down to 198 lbs               Where/when/how: By eating healthier, cutting   Patient reported progress towards goals:                - What: The patient reports he is consistent with PT exercises.           - Where/When/How: He does PT exercises on his own daily and is walking 1.5 miles a day.  Patient Reported Barriers and Concerns: He did not f/u with  Dr. Owen about trying PT again.                   - Plan for overcoming barriers: The patient states he wants to wait to have his colonoscopy in August before starting PT. He is scheduled to see Dr. Owen in September and plans to discuss then.    Care Managers Interventions:     I encouraged the patient to continue with physical therapy exercises and daily walking. I tried to help him not feel discouraged about his back pain and advised him to allow time for healing. I also encouraged him to speak to Dr. Owen about his recovery and  what else he can do to help reduce his pain.    I offered mental health services, but he declined, stating he is not depressed he is just feeling a bit discouraged because of his pain.    Care every where updated    Provided support. Encouraged patient to call as needed.    Reviewed Future Appointments:   Future Appointments   Date Time Provider Department Center   8/13/2024  8:15 AM [facility], PROCEDURE [facility] None   10/14/2024  5:30 PM [facility]       Next Care Manager Follow Up Date: One month. Encouraged patient to call as needed.    Reason For Follow Up: review progress and or barriers towards patient's goals.     Time Spent This Encounter Total: 28 min medical record review, telephone communication, care plan updates where needed, education, goals, and action plan recreation/update. Provided acknowledgment and validation to patient's concerns.   Monthly Minute Total including today: 28 min  Physical assessment, complete health history, and need for CCM established by Betito Garcia MD.

## 2024-07-18 ENCOUNTER — PATIENT OUTREACH (OUTPATIENT)
Dept: CASE MANAGEMENT | Age: 74
End: 2024-07-18

## 2024-07-18 DIAGNOSIS — R73.03 PREDIABETES: ICD-10-CM

## 2024-07-18 DIAGNOSIS — M43.10 DEGENERATIVE SPONDYLOLISTHESIS: ICD-10-CM

## 2024-07-18 DIAGNOSIS — I70.0 ATHEROSCLEROSIS OF AORTA (HCC): Primary | ICD-10-CM

## 2024-07-18 DIAGNOSIS — I10 ESSENTIAL HYPERTENSION WITH GOAL BLOOD PRESSURE LESS THAN 140/90: ICD-10-CM

## 2024-07-18 DIAGNOSIS — E78.2 MIXED HYPERLIPIDEMIA: ICD-10-CM

## 2024-07-18 NOTE — PROGRESS NOTES
Reviewed pt's chart including recent visits.  Attempted to contact pt for monthly outreach no answer left detailed message for pt to call back.     Reconciled chart using care everywhere.     Pt is due for:   Health Maintenance   Topic Date Due    Colorectal Cancer Screening  07/27/2024    Zoster Vaccines (2 of 3) 12/14/2024 (Originally 11/30/2015)    COVID-19 Vaccine (5 - 2023-24 season) 12/14/2024 (Originally 9/1/2023)    Lung Cancer Screening  05/14/2025 (Originally 12/14/2003)    Influenza Vaccine (1) 10/01/2024    PSA  06/03/2026    MA Annual Health Assessment  Completed    Annual Depression Screening  Completed    Fall Risk Screening (Annual)  Completed    Pneumococcal Vaccine: 65+ Years  Completed       Total time - 2 min  Total Monthly time- 2 min

## 2024-07-19 NOTE — PROGRESS NOTES
Spoke to Enmanuel for CCM.      Updates to patient care team/comments: None   Patient reported changes in medications:None     Med Adherence  Comment: Pt confirms he is taking medications as instructed by providers.     Health Maintenance:   Health Maintenance   Topic Date Due    Colorectal Cancer Screening  07/27/2024 SCHEDULED    Zoster Vaccines (2 of 3) 12/14/2024 (Originally 11/30/2015)    COVID-19 Vaccine (5 - 2023-24 season) 12/14/2024 (Originally 9/1/2023)    Lung Cancer Screening  05/14/2025 (Originally 12/14/2003)    Influenza Vaccine (1) 10/01/2024    PSA  06/03/2026    MA Annual Health Assessment  Completed    Annual Depression Screening  Completed    Fall Risk Screening (Annual)  Completed    Pneumococcal Vaccine: 65+ Years  Completed     Patient updates/concerns:    The patient continues to have difficulty standing for long periods and often having to sit after only a short time standing.     He reports experiencing blurred vision on his right eye, describing it as if looking through a dirty window. He has a hx of Cataracts in his left eye and is concerned he may have it in his right eye. He plans to follow up with ophthalmology However voiced concern about insurance coverage.    Additionally, he notes that his skin is extremely thin and bruises easily, despite not being on a blood thinner. He recently had routine lab work, which was normal. He has previously followed up with dermatology in regards to dry flaky skin and was recommended to apply topical lotion.     Goals/Action Plan:    Active goal from previous outreach:   Continue with  PT exercises and lose 5 lbs get down to 198 lbs          Where/when/how: By eating healthier, cutting   Patient reported progress towards goals               - What: The patient continues to work towards maintaining his goal.            - Where/When/How: He is maintaining his weight at 204 lbs and is walking 30-40 minutes a day.   Patient Reported Barriers and Concerns: The  patient does not report any new barrier at this time.                    - Plan for overcoming barriers: He will continue to work on eating healthy and keep up with daily walking.     Care Managers Interventions:   Encouraged three balanced meals along daily exercise and stretching, as tolerated.    I encouraged the patient to continue to work on PT exercises on his own and recommended he follow up in the office or with his opthalmology regarding blurred vision. I advised him to follow up with his insurance in regards to opthalmology coverage and suggested he also try the [facility] as he is a retired .    In addition I advised him to stay hydrated and try OTC Eucerin for very dry, flaky skin to see if it helps I also recommend he follow up again with dermatology.     Care every where updated    Provided support. Encouraged patient to call as needed.    Reviewed Future Appointments:   Future Appointments   Date Time Provider Department Center   8/13/2024  8:15 AM [facility], PROCEDURE [facility] None   10/14/2024  5:30 PM [facility]       Next Care Manager Follow Up Date: One month. Encouraged patient to call as needed.    Reason For Follow Up: review progress and or barriers towards patient's goals.     Time Spent This Encounter Total: 26 min medical record review, telephone communication, care plan updates where needed, education, goals, and action plan recreation/update. Provided acknowledgment and validation to patient's concerns.   Monthly Minute Total including today: 28 min  Physical assessment, complete health history, and need for CCM established by Betito Garcia MD.

## 2024-08-07 ENCOUNTER — PATIENT OUTREACH (OUTPATIENT)
Dept: CASE MANAGEMENT | Age: 74
End: 2024-08-07

## 2024-08-07 DIAGNOSIS — M54.42 CHRONIC BILATERAL LOW BACK PAIN WITH LEFT-SIDED SCIATICA: ICD-10-CM

## 2024-08-07 DIAGNOSIS — G89.29 CHRONIC BILATERAL LOW BACK PAIN WITH LEFT-SIDED SCIATICA: ICD-10-CM

## 2024-08-07 DIAGNOSIS — E78.2 MIXED HYPERLIPIDEMIA: ICD-10-CM

## 2024-08-07 DIAGNOSIS — M43.10 DEGENERATIVE SPONDYLOLISTHESIS: Primary | ICD-10-CM

## 2024-08-07 DIAGNOSIS — R73.03 PREDIABETES: ICD-10-CM

## 2024-08-07 DIAGNOSIS — I10 ESSENTIAL HYPERTENSION WITH GOAL BLOOD PRESSURE LESS THAN 140/90: ICD-10-CM

## 2024-08-07 NOTE — PROGRESS NOTES
The patient called inquiring about upcoming colonoscopy instructions. He mentioned being informed of the information but does not recall some of it.     CCM Intervention:   I reviewed the patient chart and located a [employer] message that was sent to the patient on 5/24/2024. I reviewed the information below with the patient. He reports he has been eating pistachios but will stop until after the colonoscopy.     Additionally, he reports he is about the same. He is walking daily and continues to experience back pain when standing for extended periods of time, however is able to walk for longer periods of time.       SPLIT PEG Dose Instructions for Colonoscopy  For additional questions regarding your prep instructions, steps & information, please visit:     [email or web address]    [facility]     MD Bartolo Richardson, MD ADRIAN Ann MD Atena Lodhi, MD Patrick J. Lynch, MD Shubha Singh, MD George Stathopoulos, MD Allison Rzepczynski, MD Emy Patterson MD     [x]Split Dose: Suprep, Moviprep           Your appointment is on: (day of the week) ____TUESDAY______ (date)___8/13/2024____  (arrival time)___7:15AM___.     You are scheduled to have your test done at (CHECK ONE):      [x]  [facility]: [address] [place] [facility]. 2nd floor         Endoscopy registration desk.              CANCELLATION POLICY  Our goal is to provide quality individualized care in a timely fashion.   Late cancellation (within 72 hours of procedure) and no shows disrupt operations and prevent access to patients awaiting to be scheduled.  Should you need to cancel your procedure, we expect to be notified within 5 days of your scheduled procedure for all nonurgent reasons.  You will receive a call from pre-admissions testing (PAT) one week prior to the procedure. If you do not return call  their call within 72 hours of your procedure date, you will be cancelled.  Failure to notify our office may result in being charged our cancellation fee of $100.  Cancellation fees will not be covered by insurance.     *READ ALL OF THESE INSTRUCTIONS SEVERAL DAYS BEFORE YOUR SCHEDULED TEST DATE*     MEDICATIONS OK TO TAKE BEFORE THE PROCEDURE:     You may take your usual medications, including Aspirin.  Statins & Seizure medications ? OK  BETA BLOCKERS ? OK  ACE INHIBITORS, ANGIOTENSIN II RECEPTOR BLOCKERS (ARBs) & COMBINATION DRUGS THAT CONTAIN AN ARB ? OK     MEDICATIONS TO HOLD ? DO NOT TAKE BEFORE PROCEDURE:     Iron (ferrous sulfate/ ferrous gluconate), Herbal Supplements & Multivitamins - HOLD 7 DAYS (1 WEEK) prior to the procedure.  Ozempic, Wegovy, Mounjaro, Trulicity, Rybelsus, Victoza - HOLD 7 DAYS (1 WEEK) prior to the procedure.   Jardiance, Invokana, Farxiga, Steglatro, Brenzavvy - HOLD 4 DAYS (96 hours) prior to the procedure.   ANOREXIANTS (Weight Loss Medication - i.e., Phentermine/Vyvanse) - HOLD 7 DAYS (1 WEEK) prior to the procedure.  DO NOT TAKE: Any form of Erectile Dysfunction medications for 3 DAYS (72 hours) prior to the procedure UNLESS prescribed for Pulmonary Hypertension.  DO NOT TAKE: Any form of alcohol and recreational drugs for 1 DAY (24 hours) prior to the procedure.     PATIENTS ON BLOOD THINNERS (ANTICOAGULANTS)     YOU are responsible for contacting your cardiologist or prescribing provider, regarding recommendations on holding these therapies prior to your scheduled procedure.  This should be done at least 2 weeks in advance, as some medications will need to be held 5 days prior to your procedure.  IF you forget to stop your blood thinner, your procedure will be canceled.         PATIENTS ON DIABETIC MEDICATIONS (ORAL/INJECTIONS)     HOLD oral diabetic medications the day before and day of procedure or per prescribing provider discretion.  IF YOU TAKE INSULIN  YOU will need to  contact your endocrinologist or prescribing provider, prior to your scheduled procedure for recommendations on adjustments to these therapies during the bowel prep process and the day of your procedure.  This should be done at least two weeks in advance.  Bring your insulin with you to the exam.     **Adjustments to these therapies are to ensure your safety during the bowel prep process and procedure.  Failure to contact your providers and make the necessary adjustments to your medications prior to your scheduled procedure date, may result in the cancellation.        Additional Instructions: ________________________________________________________________________________________________________________________________________________________________________________________________     FIVE DAYS BEFORE THE COLONOSCOPY:  DO NOT EAT: raw fruits and vegetables, corn, nuts, seeds and popcorn.  RECOMMENDED DIET:  meats, fish, breads, pasta, cereal, rice, mashed potatoes, bananas, applesauce, eggs, and canned peaches/pears.            TWO DAYS BEFORE THE COLONOSCOPY:   a small pack of simethicone tablets (anti-gas chewables or soft gels such as Gas-X, Mylanta Gas, Maalox Anti-Gas, or similar products). This is over the counter/no prescription required.  Continue to follow recommended diet: meats, fish, breads, pasta, cereal, rice, mashed potatoes, bananas, applesauce, eggs, and canned peaches/pears.             ONE DAY BEFORE THE COLONOSCOPY:  You may have a light breakfast (banana, eggs, or toast).  FOR LUNCH & DINNER, ONLY DRINK CLEAR LIQUIDS (See below).      BOWEL PREP:  START DRINKING PREP AT: 5:00 PM. Complete FIRST HALF by 8:00 PM.   At 9:00PM: Take 2 simethicone anti-gas chewables or soft gels with 8 ounces of clear liquid. The simethicone medication reduces bubble formation in your colon and improves exam quality.   IF simethicone anti-gas chewables or soft  gels are red this is OK to consume. Continue to  avoid all red-colored clear liquids until after the procedure!      If you feel nauseated or bloated when drinking, take a short break and walk around to help the liquid pass through your intestines.         DAY OF COLONOSCOPY:  NO SOLID FOODS!     BOWEL PREP:  Complete SECOND HALF of bowel prep.   START drinking prep (6 Hours prior to procedure time) AT: __2:15AM________   FINISH drinking prep (3 Hours BEFORE &/OR BY procedure time): ___5:15AM______  Stool must be liquid and clear without solid material in order to proceed with a successful colonoscopy.       May have CLEAR LIQUIDS up to 3 hours before procedure start time. If you fail to keep your stomach empty for three hours, your procedure may be canceled.                 TRANSPORTATION:  Plan to have someone drive you home after the procedure.  You cannot use public transportation (Uber, Lyft, Taxi). The procedure involves sedation, and you will not be allowed to leave unaccompanied. Your procedure will not proceed forward if you're unable to confirm your  planned to escort you home.     WHAT TO WEAR:  Wear casual clothes. Please leave jewelry and valuables at home. You may wear your dentures and eyeglasses; however, these will be removed prior to the procedure. Please let the staff know that you are wearing dentures before the procedure.     WHAT TO BRING:  A picture ID, proof of insurance, list of your medications and allergies, and your referral form (if your insurance requires it).     QUESTIONS/RESCHEDULING:  If you have any questions, please do not hesitate to call us at [phone]. To cancel or reschedule please contact the office at least 5 business days prior to your scheduled procedure.     CLEAR LIQUID DIET     A clear liquid diet consists of most liquids you can easily see through like water, broth and plain gelatin.  These are liquid at body temperature, easily digested and leave no residue in your intestinal tract.     NOT ACCEPTABLE  ACCEPTABLE/ALLOWED      SOLID FOOD  ANYTHING THAT IS RED, BLUE, PURPLE  DAIRY/MILK PRODUCTS/HONEY  ALCOHOL  TOMATO JUICE OR ANY FRUIT JUICE WITH PULP    LEMON-LIME SODA (SPRITE, GINGER-VANNA, 7UP, CLUB SODA)  BROTH (VEGETABLE, CHICKEN, BEEFà LIQUID ONLY, NO SOLID FOOD!)  WATER  BLACK COFFEE/TEA (NO CREAMERS, DAIRY/MILK, HONEY)  APPLE JUICE, WHITE GRAPE JUICE, LEMONADE  POPSCILES, Telugu ICE (NOT RED, BLUE, PURPLE)  JELL-O (NOT RED, BLUE, PURPLE OR MILK-BASED)  GATORADE, PROPEL, POWER-IRMA (NOT RED, BLUE, PURPLE)      **Diabetic Patients:  Check your blood glucose level before all meals and at bedtime on the preparation day and on the day of the procedure.  Check your blood glucose if at any time you have symptoms of low blood glucose or very high blood glucose.  Bring your blood glucose meter, test strips, and a low blood glucose treatment with you on the day of the procedure. Make sure clear liquids have calories and not labeled as “diet\".     NOTE: It is the patient's responsibility to check with the insurance company to see if pre-certification is required, as well as for any questions regarding benefits, & out-of-pocket costs.          Procedure Code(s): ______45378________    Diagnosis Code(s): ________Z86.010_________________    FAQs  CAN THE PHYSICIAN CHANGE, ADD, OR DELETE MY DIAGNOSIS SO THAT I CAN BE CONSIDERED ELIGIBLE FOR COLON SCREENING?  Your encounter is documented as a medical record from information you have provided as well as what is obtained in our pre-procedure history and assessment. It is a binding legal document that cannot be changed to facilitate better insurance coverage. There are strict government regulations and insurance company documentation and coding guidelines that prevent a physician from altering a chart or bill for the sole purpose of coverage determination  WHAT IF MY INSURANCE COMPANY TELLS ME THAT THE DOCTOR CAN CHANGE, ADD OR DELETE THE CPT CODE OR DIAGNOSIS CODE?  Often the  representative will tell the patient that if the \"doctor had coded this as a screening; it would have been covered differently.” However, further questioning of the representative will reveal that the “screening” diagnosis can only be amended if it applies to your case. Remember, that many insurance carriers only consider a patient aged 45 or older with no personal or family history and no past or present GI symptoms as screening (Z12.11). These records may be audited by your insurance company at their request. If your insurance plan has a high deductible, you may be asked to make a deposit prior to your procedure.  Last read by Enmanuel Lara at  6:35 PM on 5/25/2024.    Total time -20  min  Total Monthly time- 20 min

## 2024-08-13 ENCOUNTER — ANESTHESIA (OUTPATIENT)
Dept: ENDOSCOPY | Facility: HOSPITAL | Age: 74
End: 2024-08-13
Payer: MEDICARE

## 2024-08-13 ENCOUNTER — ANESTHESIA EVENT (OUTPATIENT)
Dept: ENDOSCOPY | Facility: HOSPITAL | Age: 74
End: 2024-08-13
Payer: MEDICARE

## 2024-08-13 ENCOUNTER — HOSPITAL ENCOUNTER (OUTPATIENT)
Facility: HOSPITAL | Age: 74
Setting detail: HOSPITAL OUTPATIENT SURGERY
Discharge: HOME | End: 2024-08-13
Attending: INTERNAL MEDICINE | Admitting: INTERNAL MEDICINE
Payer: MEDICARE

## 2024-08-13 DIAGNOSIS — Z86.010 PERSONAL HISTORY OF COLONIC POLYPS: ICD-10-CM

## 2024-08-13 PROCEDURE — 45385 COLONOSCOPY W/LESION REMOVAL: CPT | Performed by: INTERNAL MEDICINE

## 2024-08-13 PROCEDURE — 0DBL8ZX EXCISION OF TRANSVERSE COLON, VIA NATURAL OR ARTIFICIAL OPENING ENDOSCOPIC, DIAGNOSTIC: ICD-10-PCS | Performed by: INTERNAL MEDICINE

## 2024-08-13 RX ORDER — LIDOCAINE HYDROCHLORIDE 10 MG/ML
INJECTION, SOLUTION EPIDURAL; INFILTRATION; INTRACAUDAL; PERINEURAL AS NEEDED
Status: DISCONTINUED | OUTPATIENT
Start: 2024-08-13 | End: 2024-08-13 | Stop reason: SURG

## 2024-08-13 RX ORDER — SODIUM CHLORIDE, SODIUM LACTATE, POTASSIUM CHLORIDE, CALCIUM CHLORIDE 600; 310; 30; 20 MG/100ML; MG/100ML; MG/100ML; MG/100ML
INJECTION, SOLUTION INTRAVENOUS CONTINUOUS
Status: DISCONTINUED | OUTPATIENT
Start: 2024-08-13 | End: 2024-08-13

## 2024-08-13 RX ORDER — NALOXONE HYDROCHLORIDE 0.4 MG/ML
0.08 INJECTION, SOLUTION INTRAMUSCULAR; INTRAVENOUS; SUBCUTANEOUS ONCE AS NEEDED
Status: DISCONTINUED | OUTPATIENT
Start: 2024-08-13 | End: 2024-08-13

## 2024-08-13 RX ADMIN — LIDOCAINE HYDROCHLORIDE 50 MG: 10 INJECTION, SOLUTION EPIDURAL; INFILTRATION; INTRACAUDAL; PERINEURAL at 08:10:00

## 2024-08-13 RX ADMIN — SODIUM CHLORIDE, SODIUM LACTATE, POTASSIUM CHLORIDE, CALCIUM CHLORIDE: 600; 310; 30; 20 INJECTION, SOLUTION INTRAVENOUS at 08:04:00

## 2024-08-13 RX ADMIN — SODIUM CHLORIDE, SODIUM LACTATE, POTASSIUM CHLORIDE, CALCIUM CHLORIDE: 600; 310; 30; 20 INJECTION, SOLUTION INTRAVENOUS at 08:40:00

## 2024-08-13 NOTE — H&P
History & Physical Examination    Patient Name: Enmanuel Lara  MRN: [id number]  CSN: [id number]  YOB: 1950    Diagnosis: Personal history of adenomatous colon polyps      Medications Prior to Admission   Medication Sig Dispense Refill Last Dose    traMADol 50 MG Oral Tab Take 1-2 tablets ( mg total) by mouth.   Prn    celecoxib 200 MG Oral Cap Take 1 capsule (200 mg total) by mouth 2 (two) times daily with meals.   8/13/2024    lisinopril 10 MG Oral Tab Take 1 tablet (10 mg total) by mouth daily. 90 tablet 3 8/13/2024    simvastatin 20 MG Oral Tab Take 1 tablet (20 mg total) by mouth every evening. 90 tablet 3 8/13/2024    Ascorbic Acid (VITAMIN C) 1000 MG Oral Tab Take 1 tablet (1,000 mg total) by mouth daily.   8/13/2024    Na Sulfate-K Sulfate-Mg Sulf (SUPREP BOWEL PREP KIT) 17.5-3.13-1.6 GM/177ML Oral Solution Take as directed 1 each 0     acetaminophen 500 MG Oral Tab Take by mouth. (Patient not taking: Reported on 4/23/2024)        Current Facility-Administered Medications   Medication Dose Route Frequency    lactated ringers infusion   Intravenous Continuous       Allergies: No Known Allergies    Past Medical History:    Cataract    COPD (chronic obstructive pulmonary disease) (HCC)    Diverticulosis large intestine w/o perforation or abscess w/o bleeding    High blood pressure    High cholesterol    History of tonsillitis    Tonsillectomy; per NG    Internal hemorrhoids    MGD (meibomian gland dysfunction)    OU; per NG    Obesity, unspecified    per NG    Other and unspecified hyperlipidemia    per NG    Overweight (BMI 25.0-29.9)    Pulmonary emphysema (HCC)    PVD (peripheral vascular disease) (HCC)    per NG    S/P colonoscopic polypectomy    Synovial cyst of lumbar spine     Past Surgical History:   Procedure Laterality Date    Cataract extraction w/  intraocular lens implant Left 02/14/2011    Phaco w/ PC IOL; per NG    Cataract extraction w/  intraocular lens implant Right 11/18/2013     Phaco w/ PC IOL; per NG    Colonoscopy  2012    repeat in 2017 fall    Colonoscopy N/A 2017    Procedure: COLONOSCOPY;  Surgeon: Abbie Murphy MD;  Location: [facility] ENDO    Colonoscopy N/A 2021    Procedure: COLONOSCOPY;  Surgeon: Estiven Alfonso MD;  Location: [facility] ENDOSCOPY    Electrocardiogram, complete  10/22/2013    SCANNED TO MEDIA TAB: 10-    Spine surgery procedure unlisted      Tonsillectomy      per NG     Family History   Problem Relation Age of Onset    Cancer Father 53        Lung cancer, Cause of death; per NG    Dementia Mother         per NG    Neurological Disorder Mother 81        Alzheimer's disease, Cause of death; per NG    Diabetes Neg     Glaucoma Neg     Macular degeneration Neg     Retinal detachment Neg     Lipids Neg      Social History     Tobacco Use    Smoking status: Some Days     Current packs/day: 0.00     Average packs/day: 1 pack/day for 35.0 years (35.0 ttl pk-yrs)     Types: Cigarettes     Start date:      Last attempt to quit:      Years since quittin.6    Smokeless tobacco: Former    Tobacco comments:     Quit cigs in , less than once a month(cigs)   Substance Use Topics    Alcohol use: Yes     Alcohol/week: 0.0 standard drinks of alcohol     Comment: 1-2 drinks        SYSTEM Check if Review is Normal Check if Physical Exam is Normal If not normal, please explain:   HEENT [X ] [ X]    NECK  [X ] [ X]    HEART [X ] [ X]    LUNGS [X ] [ X]    ABDOMEN [X ] [ X]    EXTREMITIES [X ] [ X]    OTHER        [ x ] I have discussed the risks and benefits and alternatives with the patient/family.  They understand and agree to proceed with plan of care.  [ x ] I have reviewed the History and Physical done within the last 30 days.  Any changes noted above.    Estiven Alfonso MD  2024  8:05 AM

## 2024-08-13 NOTE — DISCHARGE INSTRUCTIONS
Home Care Instructions for Colonoscopy with Sedation    Diet:  - Resume your regular diet as tolerated unless otherwise instructed.  - Start with light meals to minimize bloating.  - Do not drink alcohol today.    Medication:  - If you have questions about resuming your normal medications, please contact your Primary Care Physician.    Activities:  - Take it easy today. Do not return to work today.  - Do not drive today.  - Do not operate any machinery today (including kitchen equipment).  -   Do not make any critical decisions or sign any paperwork.  - Do not exercise today.    Colonoscopy:  - You may notice some rectal \"spotting\" (a little blood on the toilet tissue) for a day or two after the exam. This is normal.  - If you experience any rectal bleeding (not spotting), persistent tenderness or sharp severe abdominal pains, oral temperature over 100 degrees Fahrenheit, light-headedness or dizziness, or any other problems, contact your doctor.      **If unable to reach your doctor, please go to the [facility] Emergency Room**    - Your referring physician will receive a full report of your examination.  - If you do not hear from your doctor's office within two weeks of your biopsy, please call them for your results.    You may be able to see your laboratory results in [facility] between 4 and 7 business days.  In some cases, your physician may not have viewed the results before they are released to [facility].  If you have questions regarding your results contact the physician who ordered the test/exam by phone or via [facility] by choosing \"Ask a Medical Question.\"

## 2024-08-13 NOTE — OPERATIVE REPORT
[facility] Endoscopy Report      Date of Procedure:  08/13/24      Preoperative Diagnosis:  Personal history of adenomatous colon polyps       Postoperative Diagnosis:  1.  Colon polyps  2.  Nearly pancolonic diverticulosis      Procedure:    Colonoscopy with polypectomy      Surgeon:  Estiven Alfonso M.D.      Anesthesia:  Monitored anesthesia care  Cecal withdrawal time: 17 minutes  EBL:  Insignificant      Brief History:  This is a 73 year old male who presents for surveillance colonoscopy in the setting of a history of adenomatous colon polyps.  The patient's last colonoscopy was 3 years prior with removal of #6 subcentimeter adenomatous polyps (#5 TAs and an SSA).  The patient has been asymptomatic from a lower gastrointestinal tract standpoint.      Technique:  After informed consent, the patient was placed in the left lateral recumbent position.  Digital rectal examination revealed no palpable intraluminal abnormalities.  An Olympus variable stiffness 190 series HD colonoscope was inserted into the rectum and advanced under direct vision by following the lumen to the terminal ileum.  The colon was examined upon withdrawal in the left lateral recumbent position.      Findings:  The preparation of the colon was good.  The terminal ileum was examined for several cm and visually normal.  The ileocecal valve was well preserved. The visualized colonic mucosa from the cecum to the anal verge was normal with an intact vascular pattern.  There were #2 polyps in the proximal transverse colon measuring 10 mm and 18 mm in size.  These had a serrated appearance.  Each was cold snare excised (the larger polyp in #2 fragments) and retrieved.  Inspection of both sites revealed no evidence of ongoing bleeding.  Scattered diverticula were seen from at least the proximal transverse colon to the sigmoid without current signs of complication.  There were no other colonic polyps, mass lesions, vascular  anomalies or signs of inflammation seen.  Retroflexion in the rectum revealed incidental internal hemorrhoids.  The procedure was well tolerated without immediate complication.      Impression:  1.  Colon polyps  2.  Uncomplicated nearly pancolonic diverticulosis    Recommendations:  1.  Standard postprocedural instructions given.  2.  Follow-up biopsy results.  3.  Further recommendations pending biopsy.        Estiven Alfonso MD  8/13/2024  8:46 AM

## 2024-08-13 NOTE — ANESTHESIA PREPROCEDURE EVALUATION
Anesthesia PreOp Note    HPI:     Enmanuel Lara is a 73 year old male who presents for preoperative consultation requested by: Estiven Alfonso MD    Date of Surgery: 8/13/2024    Procedure(s):  COLONOSCOPY  Indication: Personal history of colonic polyps    Relevant Problems   No relevant active problems       NPO:  Last Liquid Consumption Date: 08/13/24  Last Liquid Consumption Time: 0000  Last Solid Consumption Date: 08/12/24  Last Solid Consumption Time: 0800  Last Liquid Consumption Date: 08/13/24          History Review:  Patient Active Problem List    Diagnosis Date Noted    Degenerative spondylolisthesis 05/22/2023    Chronic bilateral low back pain with left-sided sciatica 05/22/2023    Prediabetes 10/17/2022    Spinal stenosis of lumbar region with radiculopathy 10/04/2021    Atherosclerosis of aorta (HCC) 06/14/2021    Cigarette nicotine dependence without complication 06/23/2020    Panlobular emphysema (HCC) 01/21/2020    Class 1 obesity due to excess calories without serious comorbidity with body mass index (BMI) of 31.0 to 31.9 in adult 01/29/2019    left 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 08/20/2018    Internal hemorrhoids 11/08/2017    Essential hypertension with goal blood pressure less than 140/90 06/20/2017    Posterior capsule opacification 07/29/2014    Pseudophakia of both eyes 07/29/2014    Osteoarthrosis, shoulder region 03/18/2013    Mixed hyperlipidemia 05/01/2012    Impotence of organic origin 10/13/2010    Chronic airway obstruction (HCC) 11/17/2008    Rosacea 10/27/2008       Past Medical History:    Cataract    COPD (chronic obstructive pulmonary disease) (HCC)    Diverticulosis large intestine w/o perforation or abscess w/o bleeding    High blood pressure    High cholesterol    History of tonsillitis    Tonsillectomy; per NG    Internal hemorrhoids    MGD (meibomian gland dysfunction)    OU; per NG    Obesity, unspecified    per NG    Other and unspecified hyperlipidemia     per NG    Overweight (BMI 25.0-29.9)    Pulmonary emphysema (HCC)    PVD (peripheral vascular disease) (HCC)    per NG    S/P colonoscopic polypectomy    Synovial cyst of lumbar spine       Past Surgical History:   Procedure Laterality Date    Cataract extraction w/  intraocular lens implant Left 02/14/2011    Phaco w/ PC IOL; per NG    Cataract extraction w/  intraocular lens implant Right 11/18/2013    Phaco w/ PC IOL; per NG    Colonoscopy  11/01/2012    repeat in 2017 fall    Colonoscopy N/A 11/08/2017    Procedure: COLONOSCOPY;  Surgeon: Abbie Murphy MD;  Location: [facility] ENDO    Colonoscopy N/A 07/27/2021    Procedure: COLONOSCOPY;  Surgeon: Estiven Alfonso MD;  Location: [facility] ENDOSCOPY    Electrocardiogram, complete  10/22/2013    SCANNED TO MEDIA TAB: 10-    Spine surgery procedure unlisted      Tonsillectomy      per        Medications Prior to Admission   Medication Sig Dispense Refill Last Dose    traMADol 50 MG Oral Tab Take 1-2 tablets ( mg total) by mouth.   Prn    celecoxib 200 MG Oral Cap Take 1 capsule (200 mg total) by mouth 2 (two) times daily with meals.   8/13/2024    lisinopril 10 MG Oral Tab Take 1 tablet (10 mg total) by mouth daily. 90 tablet 3 8/13/2024    simvastatin 20 MG Oral Tab Take 1 tablet (20 mg total) by mouth every evening. 90 tablet 3 8/13/2024    Ascorbic Acid (VITAMIN C) 1000 MG Oral Tab Take 1 tablet (1,000 mg total) by mouth daily.   8/13/2024    Na Sulfate-K Sulfate-Mg Sulf (SUPREP BOWEL PREP KIT) 17.5-3.13-1.6 GM/177ML Oral Solution Take as directed 1 each 0     acetaminophen 500 MG Oral Tab Take by mouth. (Patient not taking: Reported on 4/23/2024)        Current Facility-Administered Medications Ordered in Epic   Medication Dose Route Frequency Provider Last Rate Last Admin    lactated ringers infusion   Intravenous Continuous Estiven Alfonso MD         No current [facility]-ordered outpatient medications on file.       No Known  Allergies    Family History   Problem Relation Age of Onset    Cancer Father 53        Lung cancer, Cause of death; per NG    Dementia Mother         per NG    Neurological Disorder Mother 81        Alzheimer's disease, Cause of death; per NG    Diabetes Neg     Glaucoma Neg     Macular degeneration Neg     Retinal detachment Neg     Lipids Neg      Social History     Socioeconomic History    Marital status:    Tobacco Use    Smoking status: Some Days     Current packs/day: 0.00     Average packs/day: 1 pack/day for 35.0 years (35.0 ttl pk-yrs)     Types: Cigarettes     Start date:      Last attempt to quit:      Years since quittin.6    Smokeless tobacco: Former    Tobacco comments:     Quit cigs in 2013, less than once a month(cigs)   Vaping Use    Vaping status: Never Used   Substance and Sexual Activity    Alcohol use: Yes     Alcohol/week: 0.0 standard drinks of alcohol     Comment: 1-2 drinks     Drug use: No   Other Topics Concern    Caffeine Concern Yes     Comment: Soda, QOD; per NG    Exercise No       Available pre-op labs reviewed.             Vital Signs:  Body mass index is 31.63 kg/m².   height is 1.727 m (5' 8\") and weight is 94.3 kg (208 lb). His blood pressure is 132/78 and his pulse is 91. His respiration is 12 and oxygen saturation is 96%.   Vitals:    24 1125 24 0735   BP:  132/78   Pulse:  91   Resp:  12   SpO2:  96%   Weight: 94.3 kg (208 lb)    Height: 1.727 m (5' 8\")         Anesthesia Evaluation     Patient summary reviewed and Nursing notes reviewed    Airway   Mallampati: II  TM distance: >3 FB  Neck ROM: full  Dental    (+) partials    Pulmonary    (+) COPD, decreased breath sounds  (-) sleep apnea    ROS comment: Current Cigar smoker  Cardiovascular   (+) hypertension  (-) CABG/stent, dysrhythmias    ECG reviewed  Rhythm: regular    Neuro/Psych    (+)  neuromuscular disease,      (-) TIA, CVA    GI/Hepatic/Renal    (+) bowel prep    Endo/Other    (-)  diabetes mellitus  Abdominal   (+) obese                 Anesthesia Plan:   ASA:  2  Plan:   MAC  Post-op Pain Management: Oral pain medication and IV analgesics  Informed Consent Plan and Risks Discussed With:  Patient  Discussed plan with:  Surgeon      I have informed Enmanuel MARIELA Lara and/or legal guardian or family member of the nature of the anesthetic plan, benefits, risks including possible dental damage if relevant, major complications, and any alternative forms of anesthetic management.   All of the patient's questions were answered to the best of my ability. The patient desires the anesthetic management as planned.  Neeru Travis CRNA  8/13/2024 7:48 AM  Present on Admission:  **None**

## 2024-08-13 NOTE — ANESTHESIA POSTPROCEDURE EVALUATION
Patient: Enmanuel Lara    Procedure Summary       Date: 08/13/24 Room / Location: [facility] ENDOSCOPY 04 / [facility] ENDOSCOPY    Anesthesia Start: 0804 Anesthesia Stop: 0848    Procedure: COLONOSCOPY Diagnosis:       Personal history of colonic polyps      (diverticulosis, polyps,)    Surgeons: Estiven Alfonso MD Anesthesiologist: Neeru Travis CRNA    Anesthesia Type: MAC ASA Status: 2            Anesthesia Type: MAC    Vitals Value Taken Time   /70 08/13/24 0847        Pulse 77 08/13/24 0847   Resp 15 08/13/24 0847   SpO2 92 % 08/13/24 0847   Vitals shown include unfiled device data.    [facility] AN Post Evaluation:   Patient Evaluated in PACU  Patient Participation: waiting for patient participation  Level of Consciousness: sleepy but conscious  Pain Management: adequate  Airway Patency:patent  Yes    Nausea/Vomiting: none  Cardiovascular Status: hemodynamically stable  Respiratory Status: acceptable and room air  Postoperative Hydration acceptable      Neeru Travis CRNA  8/13/2024 8:48 AM

## 2024-08-14 ENCOUNTER — TELEPHONE (OUTPATIENT)
Facility: CLINIC | Age: 74
End: 2024-08-14

## 2024-08-14 VITALS
DIASTOLIC BLOOD PRESSURE: 82 MMHG | SYSTOLIC BLOOD PRESSURE: 125 MMHG | RESPIRATION RATE: 17 BRPM | HEIGHT: 68 IN | OXYGEN SATURATION: 96 % | BODY MASS INDEX: 31.52 KG/M2 | WEIGHT: 208 LBS | HEART RATE: 73 BPM

## 2024-08-14 NOTE — TELEPHONE ENCOUNTER
----- Message from Estiven Moralesmanuel sent at 8/13/2024  5:26 PM CDT -----  I spoke to Enmanuel.  He is feeling well.  He had #2 adenomatous polyps removed including a sessile serrated adenoma approaching 2 cm in size.  I discussed the significance.  Normally a surveillance colonoscopy would be advised in 3 years.  The decision to proceed should be based on patient preference, functional status and comorbidities.  The patient would prefer a 4 L PEG lavage for future preparations if a subsequent colonoscopy is to be performed.    GI RNs: Please enter colonoscopy recall for 3 years.

## 2024-08-14 NOTE — TELEPHONE ENCOUNTER
Health Maintenance Updated.    3 year colonoscopy recall entered into patient outreach in [facility].  Next colonoscopy will be due 8/13/2027

## 2024-09-27 ENCOUNTER — PATIENT OUTREACH (OUTPATIENT)
Dept: CASE MANAGEMENT | Age: 74
End: 2024-09-27

## 2024-09-27 DIAGNOSIS — E66.811 CLASS 1 OBESITY DUE TO EXCESS CALORIES WITHOUT SERIOUS COMORBIDITY WITH BODY MASS INDEX (BMI) OF 31.0 TO 31.9 IN ADULT: ICD-10-CM

## 2024-09-27 DIAGNOSIS — E78.2 MIXED HYPERLIPIDEMIA: ICD-10-CM

## 2024-09-27 DIAGNOSIS — I70.0 ATHEROSCLEROSIS OF AORTA (HCC): Primary | ICD-10-CM

## 2024-09-27 DIAGNOSIS — I10 ESSENTIAL HYPERTENSION WITH GOAL BLOOD PRESSURE LESS THAN 140/90: ICD-10-CM

## 2024-09-27 DIAGNOSIS — R73.03 PREDIABETES: ICD-10-CM

## 2024-09-27 DIAGNOSIS — M54.16 SPINAL STENOSIS OF LUMBAR REGION WITH RADICULOPATHY: ICD-10-CM

## 2024-09-27 DIAGNOSIS — E66.09 CLASS 1 OBESITY DUE TO EXCESS CALORIES WITHOUT SERIOUS COMORBIDITY WITH BODY MASS INDEX (BMI) OF 31.0 TO 31.9 IN ADULT: ICD-10-CM

## 2024-09-27 DIAGNOSIS — M48.061 SPINAL STENOSIS OF LUMBAR REGION WITH RADICULOPATHY: ICD-10-CM

## 2024-09-27 NOTE — PROGRESS NOTES
Spoke to Enmanuel for CCM.      Updates to patient care team/comments: None   Patient reported changes in medications: None     Med Adherence  Comment: Pt confirmed he is taking medications as instructed by their providers.     Health Maintenance:   Health Maintenance   Topic Date Due    Tobacco Cessation Counseling  Never done    COVID-19 Vaccine (5 - 2023-24 season) 09/01/2024 The patient declined.    Zoster Vaccines (2 of 3) 12/14/2024 (Originally 11/30/2015)    Lung Cancer Screening  05/14/2025 (Originally 12/14/2003)    Influenza Vaccine (1) 10/01/2024    PSA  06/03/2026    Colorectal Cancer Screening  08/13/2027    MA Annual Health Assessment  Completed    Annual Depression Screening  Completed    Fall Risk Screening (Annual)  Completed    Pneumococcal Vaccine: 65+ Years  Completed     Patient updates/concerns:     The patient reports that he followed up with Dr. Owen.and was advised that his back is stable.However, the patient continues to experience difficulty standing for extended periods of time, which has left him feeling discouraged and it has been over a year since his surgery. The patient reports no intention to return for further follow up visits with Dr. Owen. Despite this, he continues to walk daily, covering 1-1.5 miles. He does not experience pain during continues activity but does reports discomfort when standing for extended periods.     Goals/Action Plan:    Active goal from previous outreach:   Continue with  PT exercises and lose 5 lbs get down to 198 lbs          Where/when/how: By eating healthier, cutting   Patient reported progress towards goals:                - What:  He continues  to make efforts towards maintaining his goal.           - Where/When/How: He is walking daily and working on eating healthier.  Patient Reported Barriers and Concerns: He is not consistent PT exercises on his own.                   - Plan for overcoming barriers: He will resume PT exercises on his own  daily.    Care Managers Interventions:     Nutrition and exercise counseling: I encouraged the patient to consume three balanced meals daily and discussed the importance of incorporating 20-30 minutes of daily exercise, including gentle stretches. To improve overall health.    Chat review and documentation: I thoroughly reviewed the patient chart and updated Care Everywhere.     Immunization States: I conducted an [facility] immunization query, which confirmed that no updates were necessary at this time.     Social determinants of health: I updated social determinants of health to reflect any recent changes.    Patient support: I actively listened to the patient's concern's, provided emotional support and encouraged the patient to reach out, if he needs further assistance.     Reviewed Future Appointments:   Future Appointments   Date Time Provider Department Center   10/14/2024  5:30 PM Betito Garcia MD [facility] Care Manager Follow Up Date: One month. Encouraged patient to call as needed.    Reason For Follow Up: review progress and or barriers towards patient's goals.     Time Spent This Encounter Total: 25 min medical record review, telephone communication, care plan updates where needed, education, goals, and action plan recreation/update. Provided acknowledgment and validation to patient's concerns.   Monthly Minute Total including today: 25  min  Physical assessment, complete health history, and need for CCM established by Betito Garcia MD.

## 2024-09-30 PROCEDURE — 99490 CHRNC CARE MGMT STAFF 1ST 20: CPT

## 2024-09-30 PROCEDURE — 1159F MED LIST DOCD IN RCRD: CPT

## 2024-10-14 ENCOUNTER — OFFICE VISIT (OUTPATIENT)
Dept: INTERNAL MEDICINE CLINIC | Facility: CLINIC | Age: 74
End: 2024-10-14

## 2024-10-14 VITALS
DIASTOLIC BLOOD PRESSURE: 80 MMHG | BODY MASS INDEX: 31.37 KG/M2 | WEIGHT: 207 LBS | SYSTOLIC BLOOD PRESSURE: 128 MMHG | HEART RATE: 82 BPM | HEIGHT: 68 IN | OXYGEN SATURATION: 99 %

## 2024-10-14 DIAGNOSIS — M48.061 SPINAL STENOSIS OF LUMBAR REGION WITH RADICULOPATHY: ICD-10-CM

## 2024-10-14 DIAGNOSIS — M54.16 SPINAL STENOSIS OF LUMBAR REGION WITH RADICULOPATHY: ICD-10-CM

## 2024-10-14 DIAGNOSIS — E78.2 MIXED HYPERLIPIDEMIA: ICD-10-CM

## 2024-10-14 DIAGNOSIS — I10 ESSENTIAL HYPERTENSION WITH GOAL BLOOD PRESSURE LESS THAN 140/90: Primary | ICD-10-CM

## 2024-10-14 PROCEDURE — 3079F DIAST BP 80-89 MM HG: CPT | Performed by: INTERNAL MEDICINE

## 2024-10-14 PROCEDURE — 3074F SYST BP LT 130 MM HG: CPT | Performed by: INTERNAL MEDICINE

## 2024-10-14 PROCEDURE — 1126F AMNT PAIN NOTED NONE PRSNT: CPT | Performed by: INTERNAL MEDICINE

## 2024-10-14 PROCEDURE — 1159F MED LIST DOCD IN RCRD: CPT | Performed by: INTERNAL MEDICINE

## 2024-10-14 PROCEDURE — 99214 OFFICE O/P EST MOD 30 MIN: CPT | Performed by: INTERNAL MEDICINE

## 2024-10-14 PROCEDURE — G2211 COMPLEX E/M VISIT ADD ON: HCPCS | Performed by: INTERNAL MEDICINE

## 2024-10-14 PROCEDURE — 3008F BODY MASS INDEX DOCD: CPT | Performed by: INTERNAL MEDICINE

## 2024-10-14 RX ORDER — CELECOXIB 200 MG/1
200 CAPSULE ORAL 2 TIMES DAILY WITH MEALS
Qty: 180 CAPSULE | Refills: 1 | Status: SHIPPED | OUTPATIENT
Start: 2024-10-14 | End: 2025-04-12

## 2024-10-14 NOTE — PROGRESS NOTES
Enmanuel Lara is a 73 year old male.  Chief Complaint   Patient presents with    Follow - Up     6 month follow up     HPI:   73-year-old gentleman here for follow-up.  Overall he reports that he is doing well.  Back pain is better.  He was seen by spine doctor last month.  I have reviewed the chart in Care Everywhere.  He has released him okay to take Celebrex.      Current Outpatient Medications   Medication Sig Dispense Refill    celecoxib 200 MG Oral Cap Take 1 capsule (200 mg total) by mouth 2 (two) times daily with meals. 180 capsule 1    traMADol 50 MG Oral Tab Take 1-2 tablets ( mg total) by mouth.      lisinopril 10 MG Oral Tab Take 1 tablet (10 mg total) by mouth daily. 90 tablet 3    simvastatin 20 MG Oral Tab Take 1 tablet (20 mg total) by mouth every evening. 90 tablet 3    Ascorbic Acid (VITAMIN C) 1000 MG Oral Tab Take 1 tablet (1,000 mg total) by mouth daily.      acetaminophen 500 MG Oral Tab Take by mouth. (Patient not taking: Reported on 10/14/2024)        Past Medical History:    Cataract    COPD (chronic obstructive pulmonary disease) (HCC)    Diverticulosis large intestine w/o perforation or abscess w/o bleeding    High blood pressure    High cholesterol    History of tonsillitis    Tonsillectomy; per NG    Internal hemorrhoids    MGD (meibomian gland dysfunction)    OU; per NG    Obesity, unspecified    per NG    Other and unspecified hyperlipidemia    per NG    Overweight (BMI 25.0-29.9)    Pulmonary emphysema (HCC)    PVD (peripheral vascular disease) (HCC)    per NG    S/P colonoscopic polypectomy    Synovial cyst of lumbar spine      Past Surgical History:   Procedure Laterality Date    Cataract extraction w/  intraocular lens implant Left 02/14/2011    Phaco w/ PC IOL; per NG    Cataract extraction w/  intraocular lens implant Right 11/18/2013    Phaco w/ PC IOL; per NG    Colonoscopy  11/01/2012    repeat in 2017 fall    Colonoscopy N/A 11/08/2017    Procedure: COLONOSCOPY;  Surgeon:  Abbie Murphy MD;  Location: [facility] ENDO    Colonoscopy N/A 2021    Procedure: COLONOSCOPY;  Surgeon: Estiven Alfonso MD;  Location: [facility] ENDOSCOPY    Colonoscopy N/A 2024    Procedure: COLONOSCOPY;  Surgeon: Estiven Alfonso MD;  Location: [facility] ENDOSCOPY    Electrocardiogram, complete  10/22/2013    SCANNED TO MEDIA TAB: 10-    Spine surgery procedure unlisted      Tonsillectomy      per NG      Social History:  Social History     Socioeconomic History    Marital status:    Tobacco Use    Smoking status: Some Days     Current packs/day: 0.00     Average packs/day: 1 pack/day for 35.0 years (35.0 ttl pk-yrs)     Types: Cigarettes     Start date:      Last attempt to quit:      Years since quittin.7    Smokeless tobacco: Former    Tobacco comments:     Quit cigs in , less than once a month(cigs)   Vaping Use    Vaping status: Never Used   Substance and Sexual Activity    Alcohol use: Yes     Alcohol/week: 0.0 standard drinks of alcohol     Comment: 1-2 drinks     Drug use: No   Other Topics Concern    Caffeine Concern Yes     Comment: Soda, QOD; per NG    Exercise No   Social History Narrative    The patient does not use an assistive device..      The patient does live in a home with stairs.     Social Drivers of Health     Financial Resource Strain: Low Risk  (3/20/2023)    Financial Resource Strain     Difficulty of Paying Living Expenses: Not very hard     Med Affordability: No   Food Insecurity: No Food Insecurity (3/20/2023)    Food Insecurity     Food Insecurity: Never true   Transportation Needs: No Transportation Needs (3/20/2023)    Transportation Needs     Lack of Transportation: No   Physical Activity: Insufficiently Active (2024)    Exercise Vital Sign     Days of Exercise per Week: 3 days     Minutes of Exercise per Session: 20 min   Stress: No Stress Concern Present (3/20/2023)    Stress     Feeling of Stress : No   Social Connections:  Socially Integrated (3/20/2023)    Social Connections     Frequency of Socialization with Friends and Family: 2   Housing Stability: Low Risk  (3/20/2023)    Housing Stability     Housing Instability: No      Family History   Problem Relation Age of Onset    Cancer Father 53        Lung cancer, Cause of death; per NG    Dementia Mother         per NG    Neurological Disorder Mother 81        Alzheimer's disease, Cause of death; per NG    Diabetes Neg     Glaucoma Neg     Macular degeneration Neg     Retinal detachment Neg     Lipids Neg       Allergies[1]     REVIEW OF SYSTEMS:   Review of Systems   Review of Systems   Constitutional: Negative for activity change, appetite change and fever.   HENT: Negative for congestion and voice change.    Respiratory: Negative for cough and shortness of breath.    Cardiovascular: Negative for chest pain.   Gastrointestinal: Negative for abdominal distention, abdominal pain and vomiting.   Genitourinary: Negative for hematuria.   Skin: Negative for wound.   Psychiatric/Behavioral: Negative for behavioral problems.   Wt Readings from Last 5 Encounters:   10/14/24 207 lb (93.9 kg)   08/08/24 208 lb (94.3 kg)   04/15/24 204 lb (92.5 kg)   03/11/24 204 lb (92.5 kg)   10/16/23 203 lb (92.1 kg)     Body mass index is 31.47 kg/m².      EXAM:   /80   Pulse 82   Ht 5' 8\" (1.727 m)   Wt 207 lb (93.9 kg)   SpO2 99%   BMI 31.47 kg/m²   Physical Exam   Constitutional:       Appearance: Normal appearance.   HENT:      Head: Normocephalic.   Eyes:      Conjunctiva/sclera: Conjunctivae normal.   Cardiovascular:      Rate and Rhythm: Normal rate and regular rhythm.      Heart sounds: Normal heart sounds. No murmur heard.  Pulmonary:      Effort: Pulmonary effort is normal.      Breath sounds: Normal breath sounds. No rhonchi or rales.   Abdominal:      General: Bowel sounds are normal.      Palpations: Abdomen is soft.      Tenderness: There is no abdominal tenderness.    Musculoskeletal:      Cervical back: Neck supple.      Right lower leg: No edema.      Left lower leg: No edema.   Skin:     General: Skin is warm and dry.   Neurological:      General: No focal deficit present.      Mental Status: He is alert and oriented to person, place, and time. Mental status is at baseline.   Psychiatric:         Mood and Affect: Mood normal.         Behavior: Behavior normal.       ASSESSMENT AND PLAN:   1. Essential hypertension with goal blood pressure less than 140/90  Lab Results   Component Value Date    CREATSERUM 0.81 03/11/2024    TSH 1.963 06/03/2024     Will continue to monitor blood pressure.  Encouraged patient to avoid salt.  Continue current medical regimen.      2. Mixed hyperlipidemia  Lab Results   Component Value Date    LDL 36 06/03/2024    AST 20 03/11/2024    ALT 19 03/11/2024      Encouraged patient to eat healthy.  Avoid fat fried foods and increase activity as tolerated.  We will monitor lipid profile and liver function test.      3. Spinal stenosis of lumbar region with radiculopathy  Status post surgery-doing well.  He takes Celebrex with meals.  I have refilled the medication.    Plan: As above.  I will see him back in 6 months.  Meanwhile, if there is any concerns he will contact me.      The patient indicates understanding of these issues and agrees to the plan.  No follow-ups on file.    This note was prepared using Dragon Medical voice recognition dictation software. As a result errors may occur. When identified these errors have been corrected. While every attempt is made to correct errors during dictation discrepancies may still exist.       [1] No Known Allergies

## 2024-11-19 ENCOUNTER — PATIENT OUTREACH (OUTPATIENT)
Dept: CASE MANAGEMENT | Age: 74
End: 2024-11-19

## 2024-11-19 DIAGNOSIS — M54.16 SPINAL STENOSIS OF LUMBAR REGION WITH RADICULOPATHY: ICD-10-CM

## 2024-11-19 DIAGNOSIS — E66.09 CLASS 1 OBESITY DUE TO EXCESS CALORIES WITHOUT SERIOUS COMORBIDITY WITH BODY MASS INDEX (BMI) OF 31.0 TO 31.9 IN ADULT: ICD-10-CM

## 2024-11-19 DIAGNOSIS — E66.811 CLASS 1 OBESITY DUE TO EXCESS CALORIES WITHOUT SERIOUS COMORBIDITY WITH BODY MASS INDEX (BMI) OF 31.0 TO 31.9 IN ADULT: ICD-10-CM

## 2024-11-19 DIAGNOSIS — G89.29 CHRONIC BILATERAL LOW BACK PAIN WITH LEFT-SIDED SCIATICA: ICD-10-CM

## 2024-11-19 DIAGNOSIS — I10 ESSENTIAL HYPERTENSION WITH GOAL BLOOD PRESSURE LESS THAN 140/90: ICD-10-CM

## 2024-11-19 DIAGNOSIS — M54.42 CHRONIC BILATERAL LOW BACK PAIN WITH LEFT-SIDED SCIATICA: ICD-10-CM

## 2024-11-19 DIAGNOSIS — M48.061 SPINAL STENOSIS OF LUMBAR REGION WITH RADICULOPATHY: ICD-10-CM

## 2024-11-19 DIAGNOSIS — I70.0 ATHEROSCLEROSIS OF AORTA (HCC): Primary | ICD-10-CM

## 2024-11-19 NOTE — PROGRESS NOTES
Spoke to Enmanuel for CCM.      Updates to patient care team/comments: None   Patient reported changes in medications: None     Med Adherence  Comment: Pt confirmed he is taking medications as instructed by their providers.     Health Maintenance:   Health Maintenance   Topic Date Due    Tobacco Cessation Counseling  Never done He is not ready to quit.    Influenza Vaccine (1) 10/01/2024 Declined at this time.    Zoster Vaccines (2 of 3) 12/14/2024 (Originally 11/30/2015)    Lung Cancer Screening  05/14/2025 (Originally 12/14/2003)    COVID-19 Vaccine (5 - 2024-25 season) 10/27/2025 (Originally 9/1/2024)    PSA  06/03/2026    Colorectal Cancer Screening  08/13/2027    MA Annual Health Assessment  Completed    Annual Depression Screening  Completed    Fall Risk Screening (Annual)  Completed    Pneumococcal Vaccine: 65+ Years  Completed     Patient updates/concerns:     The patient reports feeling generally well but continues to experience some back pain, primarily when standing for prolonged periods. He denies any pain while ambulating and makes an effort to stay active by walking particularly as the weather has been favorable. He has not new concerns at this time and follow up with  last month for a  blood pressure follow up. The patient reports his BP is well controlled and he does not monitor his BP at home.     Goals/Action Plan:    Active goal from previous outreach:   Continue with  PT exercises and lose 5 lbs get down to 198 lbs          Where/when/how: By eating healthier, cutting   Patient reported progress towards goals:                - What: He is steadily making progress towards his goal.            - Where/When/How: He is walking 1-1.5 miles daily and intermittently does PT exercies on his own. He is also being mindful of the foods he is eating.  Patient Reported Barriers and Concerns: No new concerns at this time.                   - Plan for overcoming barriers: na    Care Managers Interventions:    Nutrition and exercise counseling: I encouraged the patient to consume three balanced meals daily and discussed the importance of incorporating 20-30 minutes of daily exercise, including gentle stretches. To improve overall health    Chat review and documentation: I thoroughly reviewed the patient chart and updated Care Everywhere.    Immunization Status: I conducted an [employer] immunization query, which confirmed that no updates were necessary at this time.     Patient support: I actively listened to the patient's concern's, provided emotional support and encouraged the patient to reach out, if he needs further assistance.     Reviewed Future Appointments:   Future Appointments   Date Time Provider Department Center   4/14/2025  5:00 PM [facility]     Next Care Manager Follow Up Date: One month. Encouraged patient to call as needed.    Reason For Follow Up: review progress and or barriers towards patient's goals.     Time Spent This Encounter Total: 23 min medical record review, telephone communication, care plan updates where needed, education, goals, and action plan recreation/update. Provided acknowledgment and validation to patient's concerns.   Monthly Minute Total including today: 23 min  Physical assessment, complete health history, and need for CCM established by Betito Garcia MD.  Friendly reminder - 2025 Benefits Open Enrollment is here!   We encourage you to review your current Medicare coverage and explore your options during this period. We have developed a Medicare Information Page on our website to serve as a resource for guidance and answers to any questions you might have.   You can access it by visiting, [email or web address]

## 2024-12-04 ENCOUNTER — HOSPITAL ENCOUNTER (OUTPATIENT)
Facility: HOSPITAL | Age: 74
Setting detail: OBSERVATION
Discharge: HOME HEALTH | End: 2024-12-08
Attending: EMERGENCY MEDICINE | Admitting: INTERNAL MEDICINE
Payer: MEDICARE

## 2024-12-04 ENCOUNTER — APPOINTMENT (OUTPATIENT)
Dept: GENERAL RADIOLOGY | Facility: HOSPITAL | Age: 74
End: 2024-12-04
Attending: EMERGENCY MEDICINE
Payer: MEDICARE

## 2024-12-04 DIAGNOSIS — J44.1 COPD EXACERBATION (HCC): Primary | ICD-10-CM

## 2024-12-04 LAB
ANION GAP SERPL CALC-SCNC: 6 MMOL/L (ref 0–18)
BASOPHILS # BLD AUTO: 0.06 X10(3) UL (ref 0–0.2)
BASOPHILS NFR BLD AUTO: 0.4 %
BUN BLD-MCNC: 19 MG/DL (ref 9–23)
BUN/CREAT SERPL: 23.8 (ref 10–20)
CALCIUM BLD-MCNC: 9.3 MG/DL (ref 8.7–10.4)
CHLORIDE SERPL-SCNC: 102 MMOL/L (ref 98–112)
CO2 SERPL-SCNC: 32 MMOL/L (ref 21–32)
CREAT BLD-MCNC: 0.8 MG/DL
D DIMER PPP FEU-MCNC: 0.42 UG/ML FEU (ref ?–0.75)
DEPRECATED RDW RBC AUTO: 44.2 FL (ref 35.1–46.3)
EGFRCR SERPLBLD CKD-EPI 2021: 92 ML/MIN/1.73M2 (ref 60–?)
EOSINOPHIL # BLD AUTO: 0.11 X10(3) UL (ref 0–0.7)
EOSINOPHIL NFR BLD AUTO: 0.7 %
ERYTHROCYTE [DISTWIDTH] IN BLOOD BY AUTOMATED COUNT: 12.9 % (ref 11–15)
FLUAV + FLUBV RNA SPEC NAA+PROBE: NEGATIVE
FLUAV + FLUBV RNA SPEC NAA+PROBE: NEGATIVE
GLUCOSE BLD-MCNC: 126 MG/DL (ref 70–99)
HCT VFR BLD AUTO: 43.4 %
HGB BLD-MCNC: 14.9 G/DL
IMM GRANULOCYTES # BLD AUTO: 0.12 X10(3) UL (ref 0–1)
IMM GRANULOCYTES NFR BLD: 0.8 %
LYMPHOCYTES # BLD AUTO: 1.52 X10(3) UL (ref 1–4)
LYMPHOCYTES NFR BLD AUTO: 10 %
MCH RBC QN AUTO: 32.2 PG (ref 26–34)
MCHC RBC AUTO-ENTMCNC: 34.3 G/DL (ref 31–37)
MCV RBC AUTO: 93.7 FL
MONOCYTES # BLD AUTO: 1.36 X10(3) UL (ref 0.1–1)
MONOCYTES NFR BLD AUTO: 8.9 %
NEUTROPHILS # BLD AUTO: 12.06 X10 (3) UL (ref 1.5–7.7)
NEUTROPHILS # BLD AUTO: 12.06 X10(3) UL (ref 1.5–7.7)
NEUTROPHILS NFR BLD AUTO: 79.2 %
NT-PROBNP SERPL-MCNC: 398 PG/ML (ref ?–450)
OSMOLALITY SERPL CALC.SUM OF ELEC: 294 MOSM/KG (ref 275–295)
PLATELET # BLD AUTO: 260 10(3)UL (ref 150–450)
POTASSIUM SERPL-SCNC: 3.8 MMOL/L (ref 3.5–5.1)
RBC # BLD AUTO: 4.63 X10(6)UL
RSV RNA SPEC NAA+PROBE: NEGATIVE
SARS-COV-2 RNA RESP QL NAA+PROBE: NOT DETECTED
SODIUM SERPL-SCNC: 140 MMOL/L (ref 136–145)
TROPONIN I SERPL HS-MCNC: 4 NG/L
WBC # BLD AUTO: 15.2 X10(3) UL (ref 4–11)

## 2024-12-04 PROCEDURE — 71045 X-RAY EXAM CHEST 1 VIEW: CPT | Performed by: EMERGENCY MEDICINE

## 2024-12-04 PROCEDURE — 99222 1ST HOSP IP/OBS MODERATE 55: CPT | Performed by: INTERNAL MEDICINE

## 2024-12-04 RX ORDER — ALBUTEROL SULFATE 5 MG/ML
10 SOLUTION RESPIRATORY (INHALATION) ONCE
Status: COMPLETED | OUTPATIENT
Start: 2024-12-04 | End: 2024-12-04

## 2024-12-04 RX ORDER — MAGNESIUM SULFATE HEPTAHYDRATE 40 MG/ML
2 INJECTION, SOLUTION INTRAVENOUS ONCE
Status: COMPLETED | OUTPATIENT
Start: 2024-12-04 | End: 2024-12-04

## 2024-12-04 RX ORDER — METHYLPREDNISOLONE SODIUM SUCCINATE 125 MG/2ML
60 INJECTION INTRAMUSCULAR; INTRAVENOUS ONCE
Status: COMPLETED | OUTPATIENT
Start: 2024-12-04 | End: 2024-12-04

## 2024-12-05 LAB
ADENOVIRUS PCR:: NOT DETECTED
ATRIAL RATE: 102 BPM
ATRIAL RATE: 120 BPM
B PARAPERT DNA SPEC QL NAA+PROBE: NOT DETECTED
B PERT DNA SPEC QL NAA+PROBE: NOT DETECTED
C PNEUM DNA SPEC QL NAA+PROBE: NOT DETECTED
CORONAVIRUS 229E PCR:: NOT DETECTED
CORONAVIRUS HKU1 PCR:: NOT DETECTED
CORONAVIRUS NL63 PCR:: NOT DETECTED
CORONAVIRUS OC43 PCR:: NOT DETECTED
FLUAV RNA SPEC QL NAA+PROBE: NOT DETECTED
FLUBV RNA SPEC QL NAA+PROBE: NOT DETECTED
LACTATE SERPL-SCNC: 2.8 MMOL/L (ref 0.5–2)
LACTATE SERPL-SCNC: 3.3 MMOL/L (ref 0.5–2)
LACTATE SERPL-SCNC: 3.5 MMOL/L (ref 0.5–2)
METAPNEUMOVIRUS PCR:: NOT DETECTED
MYCOPLASMA PNEUMONIA PCR:: NOT DETECTED
P AXIS: 79 DEGREES
P AXIS: 85 DEGREES
P-R INTERVAL: 138 MS
P-R INTERVAL: 142 MS
PARAINFLUENZA 1 PCR:: NOT DETECTED
PARAINFLUENZA 2 PCR:: NOT DETECTED
PARAINFLUENZA 3 PCR:: NOT DETECTED
PARAINFLUENZA 4 PCR:: NOT DETECTED
PROCALCITONIN SERPL-MCNC: <0.04 NG/ML (ref ?–0.05)
Q-T INTERVAL: 336 MS
Q-T INTERVAL: 360 MS
QRS DURATION: 76 MS
QRS DURATION: 84 MS
QTC CALCULATION (BEZET): 469 MS
QTC CALCULATION (BEZET): 474 MS
R AXIS: 30 DEGREES
R AXIS: 44 DEGREES
RHINOVIRUS/ENTERO PCR:: DETECTED
RSV RNA SPEC QL NAA+PROBE: NOT DETECTED
SARS-COV-2 RNA NPH QL NAA+NON-PROBE: NOT DETECTED
T AXIS: 68 DEGREES
T AXIS: 79 DEGREES
VENTRICULAR RATE: 102 BPM
VENTRICULAR RATE: 120 BPM

## 2024-12-05 PROCEDURE — 99233 SBSQ HOSP IP/OBS HIGH 50: CPT | Performed by: INTERNAL MEDICINE

## 2024-12-05 PROCEDURE — 99223 1ST HOSP IP/OBS HIGH 75: CPT | Performed by: INTERNAL MEDICINE

## 2024-12-05 RX ORDER — TRAMADOL HYDROCHLORIDE 50 MG/1
50 TABLET ORAL EVERY 6 HOURS PRN
Status: DISCONTINUED | OUTPATIENT
Start: 2024-12-05 | End: 2024-12-08

## 2024-12-05 RX ORDER — ONDANSETRON 2 MG/ML
4 INJECTION INTRAMUSCULAR; INTRAVENOUS EVERY 6 HOURS PRN
Status: DISCONTINUED | OUTPATIENT
Start: 2024-12-05 | End: 2024-12-08

## 2024-12-05 RX ORDER — IPRATROPIUM BROMIDE AND ALBUTEROL SULFATE 2.5; .5 MG/3ML; MG/3ML
3 SOLUTION RESPIRATORY (INHALATION) EVERY 6 HOURS PRN
Status: DISCONTINUED | OUTPATIENT
Start: 2024-12-05 | End: 2024-12-08

## 2024-12-05 RX ORDER — HEPARIN SODIUM 5000 [USP'U]/ML
5000 INJECTION, SOLUTION INTRAVENOUS; SUBCUTANEOUS EVERY 8 HOURS SCHEDULED
Status: DISCONTINUED | OUTPATIENT
Start: 2024-12-05 | End: 2024-12-08

## 2024-12-05 RX ORDER — HYDROCODONE BITARTRATE AND ACETAMINOPHEN 5; 325 MG/1; MG/1
1 TABLET ORAL EVERY 4 HOURS PRN
Status: DISCONTINUED | OUTPATIENT
Start: 2024-12-05 | End: 2024-12-08

## 2024-12-05 RX ORDER — METHYLPREDNISOLONE SODIUM SUCCINATE 40 MG/ML
40 INJECTION INTRAMUSCULAR; INTRAVENOUS EVERY 8 HOURS
Status: DISCONTINUED | OUTPATIENT
Start: 2024-12-05 | End: 2024-12-06

## 2024-12-05 RX ORDER — CODEINE PHOSPHATE AND GUAIFENESIN 10; 100 MG/5ML; MG/5ML
5 SOLUTION ORAL EVERY 4 HOURS PRN
Status: DISCONTINUED | OUTPATIENT
Start: 2024-12-05 | End: 2024-12-08

## 2024-12-05 RX ORDER — LISINOPRIL 10 MG/1
10 TABLET ORAL DAILY
Status: DISCONTINUED | OUTPATIENT
Start: 2024-12-05 | End: 2024-12-08

## 2024-12-05 RX ORDER — BENZONATATE 200 MG/1
200 CAPSULE ORAL 3 TIMES DAILY PRN
Status: DISCONTINUED | OUTPATIENT
Start: 2024-12-05 | End: 2024-12-08

## 2024-12-05 RX ORDER — ACETAMINOPHEN 500 MG
500 TABLET ORAL EVERY 4 HOURS PRN
Status: DISCONTINUED | OUTPATIENT
Start: 2024-12-05 | End: 2024-12-08

## 2024-12-05 RX ORDER — PRAVASTATIN SODIUM 10 MG
10 TABLET ORAL NIGHTLY
Status: DISCONTINUED | OUTPATIENT
Start: 2024-12-05 | End: 2024-12-08

## 2024-12-05 RX ORDER — METOCLOPRAMIDE HYDROCHLORIDE 5 MG/ML
10 INJECTION INTRAMUSCULAR; INTRAVENOUS EVERY 8 HOURS PRN
Status: DISCONTINUED | OUTPATIENT
Start: 2024-12-05 | End: 2024-12-08

## 2024-12-05 RX ORDER — POTASSIUM CHLORIDE 1500 MG/1
40 TABLET, EXTENDED RELEASE ORAL ONCE
Status: COMPLETED | OUTPATIENT
Start: 2024-12-05 | End: 2024-12-05

## 2024-12-05 RX ORDER — HYDROCODONE BITARTRATE AND ACETAMINOPHEN 5; 325 MG/1; MG/1
2 TABLET ORAL EVERY 4 HOURS PRN
Status: DISCONTINUED | OUTPATIENT
Start: 2024-12-05 | End: 2024-12-08

## 2024-12-05 RX ORDER — ACETAMINOPHEN 325 MG/1
650 TABLET ORAL EVERY 4 HOURS PRN
Status: DISCONTINUED | OUTPATIENT
Start: 2024-12-05 | End: 2024-12-08

## 2024-12-05 RX ORDER — IPRATROPIUM BROMIDE AND ALBUTEROL SULFATE 2.5; .5 MG/3ML; MG/3ML
3 SOLUTION RESPIRATORY (INHALATION) EVERY 6 HOURS
Status: DISCONTINUED | OUTPATIENT
Start: 2024-12-05 | End: 2024-12-07

## 2024-12-05 NOTE — PLAN OF CARE
Problem: Patient Centered Care  Goal: Patient preferences are identified and integrated in the patient's plan of care  Description: Interventions:  - What would you like us to know as we care for you? From home alone   - Provide timely, complete, and accurate information to patient/family  - Incorporate patient and family knowledge, values, beliefs, and cultural backgrounds into the planning and delivery of care  - Encourage patient/family to participate in care and decision-making at the level they choose  - Honor patient and family perspectives and choices  Outcome: Progressing     Problem: Patient/Family Goals  Goal: Patient/Family Long Term Goal  Description: Patient's Long Term Goal: To discharge from hospital     Interventions:  -  Monitor vital signs   - Monitor appropriate labs   - Pain management   - Administer medications per order   - Follow MD orders   - Diagnostics per order   - Update/inform patient and family on plan of care   - Discharge planning    - See additional Care Plan goals for specific interventions  Outcome: Progressing  Goal: Patient/Family Short Term Goal  Description: Patient's Short Term Goal: To improve shortness of breath     Interventions:   - Monitor vital signs   - Monitor appropriate labs   - Pain management   - Administer medications per order   - Follow MD orders   - Diagnostics per order   - Update/inform patient and family on plan of care   - See additional Care Plan goals for specific interventions  Outcome: Progressing     Problem: CARDIOVASCULAR - ADULT  Goal: Absence of cardiac arrhythmias or at baseline  Description: INTERVENTIONS:  - Continuous cardiac monitoring, monitor vital signs, obtain 12 lead EKG if indicated  - Evaluate effectiveness of antiarrhythmic and heart rate control medications as ordered  - Initiate emergency measures for life threatening arrhythmias  - Monitor electrolytes and administer replacement therapy as ordered  Outcome: Progressing      Problem: RESPIRATORY - ADULT  Goal: Achieves optimal ventilation and oxygenation  Description: INTERVENTIONS:  - Assess for changes in respiratory status  - Assess for changes in mentation and behavior  - Position to facilitate oxygenation and minimize respiratory effort  - Oxygen supplementation based on oxygen saturation or ABGs  - Provide Smoking Cessation handout, if applicable  - Encourage broncho-pulmonary hygiene including cough, deep breathe, Incentive Spirometry  - Assess the need for suctioning and perform as needed  - Assess and instruct to report SOB or any respiratory difficulty  - Respiratory Therapy support as indicated  - Manage/alleviate anxiety  - Monitor for signs/symptoms of CO2 retention  Outcome: Progressing     Problem: METABOLIC/FLUID AND ELECTROLYTES - ADULT  Goal: Electrolytes maintained within normal limits  Description: INTERVENTIONS:  - Monitor labs and rhythm and assess patient for signs and symptoms of electrolyte imbalances  - Administer electrolyte replacement as ordered  - Monitor response to electrolyte replacements, including rhythm and repeat lab results as appropriate  - Fluid restriction as ordered  - Instruct patient on fluid and nutrition restrictions as appropriate  Outcome: Progressing     Problem: PAIN - ADULT  Goal: Verbalizes/displays adequate comfort level or patient's stated pain goal  Description: INTERVENTIONS:  - Encourage pt to monitor pain and request assistance  - Assess pain using appropriate pain scale  - Administer analgesics based on type and severity of pain and evaluate response  - Implement non-pharmacological measures as appropriate and evaluate response  - Consider cultural and social influences on pain and pain management  - Manage/alleviate anxiety  - Utilize distraction and/or relaxation techniques  - Monitor for opioid side effects  - Notify MD/LIP if interventions unsuccessful or patient reports new pain  - Anticipate increased pain with activity  and pre-medicate as appropriate  Outcome: Progressing     Problem: RISK FOR INFECTION - ADULT  Goal: Absence of fever/infection during anticipated neutropenic period  Description: INTERVENTIONS  - Monitor WBC  - Administer growth factors as ordered  - Implement neutropenic guidelines  Outcome: Progressing     Problem: SAFETY ADULT - FALL  Goal: Free from fall injury  Description: INTERVENTIONS:  - Assess pt frequently for physical needs  - Identify cognitive and physical deficits and behaviors that affect risk of falls.  - [place] fall precautions as indicated by assessment.  - Educate pt/family on patient safety including physical limitations  - Instruct pt to call for assistance with activity based on assessment  - Modify environment to reduce risk of injury  - Provide assistive devices as appropriate  - Consider OT/PT consult to assist with strengthening/mobility  - Encourage toileting schedule  Outcome: Progressing     Problem: DISCHARGE PLANNING  Goal: Discharge to home or other facility with appropriate resources  Description: INTERVENTIONS:  - Identify barriers to discharge w/pt and caregiver  - Include patient/family/discharge partner in discharge planning  - Arrange for needed discharge resources and transportation as appropriate  - Identify discharge learning needs (meds, wound care, etc)  - Arrange for interpreters to assist at discharge as needed  - Consider post-discharge preferences of patient/family/discharge partner  - Complete POLST form as appropriate  - Assess patient's ability to be responsible for managing their own health  - Refer to Case Management Department for coordinating discharge planning if the patient needs post-hospital services based on physician/LIP order or complex needs related to functional status, cognitive ability or social support system  Outcome: Progressing     Monitoring vital signs, stable at this time. No acute changes at this moment. Fall precautions in place- bed  locked in lowest position, call light within reach. Frequent rounding by nursing staff.

## 2024-12-05 NOTE — H&P
[facility]  part of [facility]                                                                                                          History and Physical     Enmanuel Lara Patient Status:  Emergency    1950 MRN [id number]   Location [facility] EMERGENCY DEPARTMENT Attending Yvrose Wilkins DO   Hosp Day # 0 PCP Betito Garcia MD       Chief Complaint: Shortness of breath    Subjective:    Enmanuel Lara is a 73 year old male with history of COPD, HTN, HLD, obesity, PVD who presented to the ED with complaints of shortness of breath.  Onset about 2 to 3 days ago, worse with exertion.  Described as a feeling of suffocation.  Also has cough productive of greenish phlegm but no fever or chills.  He has been around sick contacts.  Tried OTC NyQuil and cough medicine without improvement, noted worsening.  No breathing treatments.  No chest pain, palpitations, headaches or dizziness.  No diarrhea, urinary symptoms.    Vital signs stable at presentation.  Labs stable except WBC 15.2.  D-dimer 0.42.  COVID/influenza/RSV negative.  CXR: No dense lung consolidation or gross pneumothorax.  Curvilinear scarring bilateral lower lungs.  Perihilar reticular opacities may reflect small airways disease.     Given bolus fluids, Solu-Medrol and magnesium and started on antibiotics as well as nebs in the ED.    While in the ED, heart monitoring notes episode of narrow complex tachycardia up to 150s, short lasting.  EKG with sinus tach with PACs.    History/Other:      Past Medical History:  Past Medical History:    Cataract    COPD (chronic obstructive pulmonary disease) (HCC)    Diverticulosis large intestine w/o perforation or abscess w/o bleeding    High blood pressure    High cholesterol    History of tonsillitis    Tonsillectomy; per NG    Internal hemorrhoids    MGD (meibomian gland dysfunction)    OU; per NG    Obesity, unspecified    per NG    Other and unspecified hyperlipidemia    per NG     Overweight (BMI 25.0-29.9)    Pulmonary emphysema (HCC)    PVD (peripheral vascular disease) (HCC)    per NG    S/P colonoscopic polypectomy    Synovial cyst of lumbar spine        Past Surgical History:   Past Surgical History:   Procedure Laterality Date    Cataract extraction w/  intraocular lens implant Left 02/14/2011    Phaco w/ PC IOL; per NG    Cataract extraction w/  intraocular lens implant Right 11/18/2013    Phaco w/ PC IOL; per NG    Colonoscopy  11/01/2012    repeat in 2017 fall    Colonoscopy N/A 11/08/2017    Procedure: COLONOSCOPY;  Surgeon: Abbie Murphy MD;  [facility]    Colonoscopy N/A 07/27/2021    Procedure: COLONOSCOPY;  Surgeon: Estiven Alfonso MD;  Location: [facility] ENDOSCOPY    Colonoscopy N/A 8/13/2024    Procedure: COLONOSCOPY;  Surgeon: Estiven Alfonso MD;  Location: [facility] ENDOSCOPY    Electrocardiogram, complete  10/22/2013    SCANNED TO MEDIA TAB: 10-    Spine surgery procedure unlisted      Tonsillectomy      per NG       Social History:  reports that he quit smoking about 11 years ago. His smoking use included cigarettes. He started smoking about 46 years ago. He has a 35 pack-year smoking history. He has quit using smokeless tobacco. He reports current alcohol use. He reports that he does not use drugs.    Family History:   Family History   Problem Relation Age of Onset    Cancer Father 53        Lung cancer, Cause of death; per NG    Dementia Mother         per NG    Neurological Disorder Mother 81        Alzheimer's disease, Cause of death; per NG    Diabetes Neg     Glaucoma Neg     Macular degeneration Neg     Retinal detachment Neg     Lipids Neg        Allergies: Allergies[1]    Medications:  Medications Ordered Prior to Encounter[2]    Review of Systems:   A comprehensive 14 point review of systems was completed.    Pertinent positives and negatives noted in the HPI.    Objective:     /74   Pulse 87   Temp 97.9 °F (36.6 °C)  (Temporal)   Resp 17   Wt 207 lb 0.2 oz (93.9 kg)   SpO2 100%   BMI 31.48 kg/m²   General: Mild respiratory distress.    HEENT: Normocephalic, atraumatic.  Neck: Supple.  Respiratory: Tachypneic, diminished breath sounds with bilateral wheezing  Cardiovascular: S1, S2 regular.  Tachycardic.  No murmur.   Abdomen: Soft, not tender or distended.  Neurologic: Alert, oriented x 3.  Nonfocal.  Musculoskeletal: No tenderness or deformity.  Extremities: No edema  Psychiatric: Appropriate and calm    Results:      Labs:  Labs Last 24 Hours:   BMP     CBC    Other     Na 140 Cl 102 BUN 19 Glu 126   Hb 14.9   PTT - Procal -   K 3.8 CO2 32.0 Cr 0.80   WBC 15.2 >< .0  INR - CRP -   Renal Lytes Endo    Hct 43.4   Trop - D dim 0.42   eGFR - Ca 9.3 POC Gluc  -    LFT   pBNP 398 Lactic -   eGFR AA - PO4 - A1c -   AST - APk - Prot -  LDL -     Mg - TSH -   ALT - T tereza - Alb -          COVID-19 Lab Results    COVID-19  Lab Results   Component Value Date    COVID19 Not Detected 12/04/2024    COVID19 Not Detected 04/08/2022       Pro-Calcitonin  No results for input(s): \"PCT\" in the last 168 hours.    Cardiac  Recent Labs   Lab 12/04/24 2138   PBNP 398       Creatinine Kinase  No results for input(s): \"CK\" in the last 168 hours.    Inflammatory Markers  Recent Labs   Lab 12/04/24 2138   DDIMER 0.42       Imaging: Imaging data reviewed in Epic.    Assessment & Plan:    COPD exacerbation  -Admit  -Steroids, nebs, antibiotics    Leukocytosis, no obvious source of infection  -Monitor  -Doubt sepsis with no source of infection.  -Check lactic acid.   -On antibiotics for COPD    Sinus tachycardia, likely due to dyspnea  -Monitor on telemetry    HTN  HLD  PVD  Obesity  -Resume home meds when reconciled    Quality:  DVT Prophylaxis: Heparin  CODE status: Full code  DANIELLE: 2-3 days      Plan of care discussed with patient and friend at bedside. Acknowledged understanding and agrees to plan. Also discussed with the ED  physician.      59 minutes spent on this admission - examining patient, obtaining history, reviewing previous medical records, going over test results/imaging and discussing plan of care. More than 50% of the time was spent in counseling and/or coordination of care related to COPD exacerbation.   All questions answered.     Kayla Ayala MD  12/4/2024                   [1] No Known Allergies  [2]   No current facility-administered medications on file prior to encounter.     Current Outpatient Medications on File Prior to Encounter   Medication Sig Dispense Refill    celecoxib 200 MG Oral Cap Take 1 capsule (200 mg total) by mouth 2 (two) times daily with meals. 180 capsule 1    traMADol 50 MG Oral Tab Take 1-2 tablets ( mg total) by mouth.      lisinopril 10 MG Oral Tab Take 1 tablet (10 mg total) by mouth daily. 90 tablet 3    simvastatin 20 MG Oral Tab Take 1 tablet (20 mg total) by mouth every evening. 90 tablet 3    acetaminophen 500 MG Oral Tab Take by mouth. (Patient not taking: Reported on 10/14/2024)      Ascorbic Acid (VITAMIN C) 1000 MG Oral Tab Take 1 tablet (1,000 mg total) by mouth daily.

## 2024-12-05 NOTE — ED INITIAL ASSESSMENT (HPI)
Patient c/o SOB for the past couple days. States that he can barley walk 20ft without getting SOB. +URI symptoms. +Sick contacts.

## 2024-12-05 NOTE — ED PROVIDER NOTES
Patient Seen in: [facility] Emergency Department      History     Chief Complaint   Patient presents with    Difficulty Breathing     Stated Complaint: Shortness of Breath    Subjective:   HPI      Very pleasant 73M hx of COPD not on O2, HTN, HLD,  presents to the ER with complaints of worsening shortness of breath for the past week. Pt states he has been feeling out of breath particularly when he exerts himself for the past week. Reports cough productive of yellow phlegm, no hemoptysis. Reports loss of smell and taste. No fever or chills. No chest pain. No abd pain, n/v/d. No leg swelling. No hx of dvt/pe. No recent travel. No recent hospitalizations. +recent sick contact with similar symptoms.     Objective:     Past Medical History:    Cataract    COPD (chronic obstructive pulmonary disease) (HCC)    Diverticulosis large intestine w/o perforation or abscess w/o bleeding    High blood pressure    High cholesterol    History of tonsillitis    Tonsillectomy; per NG    Internal hemorrhoids    MGD (meibomian gland dysfunction)    OU; per NG    Obesity, unspecified    per NG    Other and unspecified hyperlipidemia    per NG    Overweight (BMI 25.0-29.9)    Pulmonary emphysema (HCC)    PVD (peripheral vascular disease) (HCC)    per NG    S/P colonoscopic polypectomy    Synovial cyst of lumbar spine              Past Surgical History:   Procedure Laterality Date    Cataract extraction w/  intraocular lens implant Left 02/14/2011    Phaco w/ PC IOL; per NG    Cataract extraction w/  intraocular lens implant Right 11/18/2013    Phaco w/ PC IOL; per NG    Colonoscopy  11/01/2012    repeat in 2017 fall    Colonoscopy N/A 11/08/2017    Procedure: COLONOSCOPY;  Surgeon: Abbie Murphy MD;  Location: [facility] ENDO    Colonoscopy N/A 07/27/2021    Procedure: COLONOSCOPY;  Surgeon: Estiven Alfonso MD;  Location: [facility] ENDOSCOPY    Colonoscopy N/A 8/13/2024    Procedure: COLONOSCOPY;  Surgeon: Kaden  MD Estiven;  Location: [facility] ENDOSCOPY    Electrocardiogram, complete  10/22/2013    SCANNED TO MEDIA TAB: 10-    Spine surgery procedure unlisted      Tonsillectomy      per NG                Social History     Socioeconomic History    Marital status:    Tobacco Use    Smoking status: Former     Current packs/day: 0.00     Average packs/day: 1 pack/day for 35.0 years (35.0 ttl pk-yrs)     Types: Cigarettes     Start date:      Quit date:      Years since quittin.9    Smokeless tobacco: Former    Tobacco comments:     Quit cigs in , less than once a month(cigs)   Vaping Use    Vaping status: Never Used   Substance and Sexual Activity    Alcohol use: Yes     Alcohol/week: 0.0 standard drinks of alcohol     Comment: 1-2 drinks     Drug use: No   Other Topics Concern    Caffeine Concern Yes     Comment: Soda, QOD; per NG    Exercise No   Social History Narrative    The patient does not use an assistive device..      The patient does live in a home with stairs.     Social Drivers of Health     Financial Resource Strain: Low Risk  (3/20/2023)    Financial Resource Strain     Difficulty of Paying Living Expenses: Not very hard     Med Affordability: No   Food Insecurity: No Food Insecurity (2024)    Food Insecurity     Food Insecurity: Never true   Transportation Needs: No Transportation Needs (2024)    Transportation Needs     Lack of Transportation: No   Physical Activity: Insufficiently Active (2024)    Exercise Vital Sign     Days of Exercise per Week: 3 days     Minutes of Exercise per Session: 20 min   Stress: No Stress Concern Present (3/20/2023)    Stress     Feeling of Stress : No   Social Connections: Socially Integrated (3/20/2023)    Social Connections     Frequency of Socialization with Friends and Family: 2   Housing Stability: Low Risk  (2024)    Housing Stability     Housing Instability: No                  Physical Exam     ED Triage Vitals   BP 24  2050 126/86   Pulse 12/04/24 2050 104   Resp 12/04/24 2050 24   Temp 12/04/24 2050 97.9 °F (36.6 °C)   Temp src 12/04/24 2050 Temporal   SpO2 12/04/24 2050 92 %   O2 Device 12/04/24 2115 None (Room air)       Current Vitals:   Vital Signs  BP: 118/60  Pulse: 109  Resp: 20  Temp: 98.1 °F (36.7 °C)  Temp src: Oral  MAP (mmHg): 78    Oxygen Therapy  SpO2: 93 %  O2 Device: Nasal cannula  O2 Flow Rate (L/min): 2 L/min  Pulse Oximetry Type: Continuous        Physical Exam  Vitals and nursing note reviewed.   Constitutional:       General: He is not in acute distress.     Appearance: He is not ill-appearing.   HENT:      Head: Normocephalic and atraumatic.      Mouth/Throat:      Pharynx: Oropharynx is clear.   Cardiovascular:      Rate and Rhythm: Regular rhythm. Tachycardia present.      Heart sounds: No murmur heard.     Comments: +2 radial and dp pulses bilaterally   Pulmonary:      Effort: Tachypnea present.      Breath sounds: No stridor. Examination of the right-upper field reveals wheezing. Examination of the left-upper field reveals wheezing. Examination of the right-middle field reveals decreased breath sounds and wheezing. Examination of the left-middle field reveals decreased breath sounds and wheezing. Examination of the right-lower field reveals decreased breath sounds. Examination of the left-lower field reveals decreased breath sounds. Decreased breath sounds and wheezing present. No rales.      Comments: Pursed lip breathing with prolonged expiratory phase noted   Abdominal:      General: There is no distension.      Palpations: Abdomen is soft.      Tenderness: There is no abdominal tenderness. There is no guarding or rebound.   Musculoskeletal:      Cervical back: Neck supple.      Right lower leg: No edema.      Left lower leg: No edema.   Skin:     General: Skin is warm and dry.      Capillary Refill: Capillary refill takes less than 2 seconds.   Neurological:      General: No focal deficit present.       Mental Status: He is alert.             ED Course     Labs Reviewed   CBC WITH DIFFERENTIAL WITH PLATELET - Abnormal; Notable for the following components:       Result Value    WBC 15.2 (*)     Neutrophil Absolute Prelim 12.06 (*)     Neutrophil Absolute 12.06 (*)     Monocyte Absolute 1.36 (*)     All other components within normal limits   BASIC METABOLIC PANEL (8) - Abnormal; Notable for the following components:    Glucose 126 (*)     BUN/CREA Ratio 23.8 (*)     All other components within normal limits   TROPONIN I HIGH SENSITIVITY - Normal   PRO BETA NATRIURETIC PEPTIDE - Normal   D-DIMER - Normal   SARS-COV-2/FLU A AND B/RSV BY PCR (GENEXPERT) - Normal    Narrative:     This test is intended for the qualitative detection and differentiation of SARS-CoV-2, influenza A, influenza B, and respiratory syncytial virus (RSV) viral RNA in nasopharyngeal or nares swabs from individuals suspected of respiratory viral infection consistent with COVID-19 by their healthcare provider. Signs and symptoms of respiratory viral infection due to SARS-CoV-2, influenza, and RSV can be similar.    Test performed using the Xpert Xpress SARS-CoV-2/FLU/RSV (real time RT-PCR)  assay on the [employer] instrument, [employer], [place] [address].   This test is being used under the Food and Drug Administration's Emergency Use Authorization.    The authorized Fact Sheet for Healthcare Providers for this assay is available upon request from the laboratory.   LACTIC ACID, PLASMA   PROCALCITONIN   RAINBOW DRAW LAVENDER   RAINBOW DRAW LIGHT GREEN   RAINBOW DRAW BLUE   RAINBOW DRAW GOLD     EKG my interpretation  Sinus tachycardia with PACs, rate 102 bpm, normal axis, normal intervals, qtc 469 ms, no stemi.     MDM      This patient presents with shortness of breath and cough worsening for the past week. Pt speaking in full sentences but is tachypneic and wheezy on exam.     Differential diagnosis includes:  COPD/asthma exacerbation, patient  does have wheezing on exam with pursed lip breathing.    Pneumonia  Viral syndrome  CHF onset/exacerbation, patient does not have signs of fluid overload on exam, no jvd or leg edema   ACS   PE, patient is low risk risk by Wells score and not able to be excluded by PERC rule due to age and tachycardia   Anemia, acidosis, or other non primary cardiopulmonary cause     Plan: will obtain cbc, cmp, chest x-ray, ECG, troponin, d-dimer for further disposition     ED Course as of 12/05/24 0209  ------------------------------------------------------------  Time: 12/04 2223  Comment: Dimer wnl. Trop normal. Probnp normal. Cov flu rsv neg. Cbc 15K. Bmp unremarkable. K normal.     On my independent interpretation of cxr appears to have some chronic lung scarring, possible atypical multifocal infiltrates, no pulm edema or cardiomegaly.   ------------------------------------------------------------  Time: 12/04 2247  Comment: Reassessed updated with results.  States he is feeling better at this time.  On repeat auscultation moving air better with improvement of her sleep breathing however still having significant audible expiratory and inspiratory wheezes.  I discussed with patient I think he would benefit from at least admission and observation particularly since he is intermittently briefly going into a narrow complex tachycardia on his telemetry strip though nothing sustained.  He is agreeable.  Repeat albuterol nebulizer ordered.  Case discussed with hospitalist Dr. Ayala agrees with plan         X-ray chest, 2 portable upright AP views at 2202  Comparison: July 19, 2023  IMPRESSION:  No dense lung consolidation or gross pneumothorax.  Curvilinear linear scarring bilateral lower lungs.  Perihilar reticular opacities may reflect small airways disease.    ------------------------------------------------------------  Time: 12/04 5189  Comment: Per RN, pt heart rate went up into the 160s.  On my assessment heart rate in the  130s on telemetry monitor appears to be narrow complex tachycardia. On repeat EKG my interpretation - sinus tachycardia on my interpretation rate 118 bpm, again normal axis, normal intervals, QTc 462 ms.  This is still a sinus rhythm, no indication for calcium channel blocker or other intervention at this time.  Patient has been admitted to cardiac telemetry for further monitoring.           Disposition and Plan     Clinical Impression:  1. COPD exacerbation (HCC)         Disposition:  Admit  12/4/2024 10:51 pm          Supplementary Documentation:         Hospital Problems       Present on Admission  Date Reviewed: 12/4/2024            ICD-10-CM Noted POA    * (Principal) COPD exacerbation (HCC) J44.1 12/4/2024 Yes                                Yvrose Wilkins DO  Attending Physician  Emergency Medicine

## 2024-12-05 NOTE — CONSULTS
[facility]  part of [facility]    Report of Consultation    Enmanuel Lara Patient Status:  Observation    1950 MRN [id number]   Location [facility]5W Attending Maryam Wilhelm MD   Hosp Day # 0 PCP Betito Garcia MD     Date of Admission:  2024    Reason for Consultation:   Dyspnea    History of Present Illness:   Patient is a 73-year-old male with past medical history significant for likely COPD, hypertension, hyperlipidemia who presented with chief complaint of 2 to 3 days of dyspnea cough productive in nature.  Denies use of maintenance inhaler therapy at home.  Admits to 90-pack-year history of tobacco abuse quit few weeks ago.  Dyspnea improved during hospital course.  Cough ongoing.    Past Medical History  Past Medical History:    Cataract    COPD (chronic obstructive pulmonary disease) (HCC)    Diverticulosis large intestine w/o perforation or abscess w/o bleeding    High blood pressure    High cholesterol    History of tonsillitis    Tonsillectomy; per NG    Internal hemorrhoids    MGD (meibomian gland dysfunction)    OU; per NG    Obesity, unspecified    per NG    Other and unspecified hyperlipidemia    per NG    Overweight (BMI 25.0-29.9)    Pulmonary emphysema (HCC)    PVD (peripheral vascular disease) (HCC)    per NG    S/P colonoscopic polypectomy    Synovial cyst of lumbar spine       Past Surgical History  Past Surgical History:   Procedure Laterality Date    Cataract extraction w/  intraocular lens implant Left 2011    Phaco w/ PC IOL; per NG    Cataract extraction w/  intraocular lens implant Right 2013    Phaco w/ PC IOL; per NG    Colonoscopy  2012    repeat in 2017 fall    Colonoscopy N/A 2017    Procedure: COLONOSCOPY;  Surgeon: Abbie Murphy MD;  Location: [facility] ENDO    Colonoscopy N/A 2021    Procedure: COLONOSCOPY;  Surgeon: Estiven Alfonso MD;  Location: [facility] ENDOSCOPY    Colonoscopy N/A 2024     Procedure: COLONOSCOPY;  Surgeon: Estiven Alfonso MD;  Location: [facility] ENDOSCOPY    Electrocardiogram, complete  10/22/2013    SCANNED TO MEDIA TAB: 10-    Spine surgery procedure unlisted      Tonsillectomy      per NG       Family History  Family History   Problem Relation Age of Onset    Cancer Father 53        Lung cancer, Cause of death; per NG    Dementia Mother         per NG    Neurological Disorder Mother 81        Alzheimer's disease, Cause of death; per NG    Diabetes Neg     Glaucoma Neg     Macular degeneration Neg     Retinal detachment Neg     Lipids Neg        Social History  Social History     Socioeconomic History    Marital status:    Tobacco Use    Smoking status: Former     Current packs/day: 0.00     Average packs/day: 1 pack/day for 35.0 years (35.0 ttl pk-yrs)     Types: Cigarettes     Start date:      Quit date:      Years since quittin.9    Smokeless tobacco: Former    Tobacco comments:     Quit cigs in , less than once a month(cigs)   Vaping Use    Vaping status: Never Used   Substance and Sexual Activity    Alcohol use: Yes     Alcohol/week: 0.0 standard drinks of alcohol     Comment: 1-2 drinks     Drug use: No   Other Topics Concern    Caffeine Concern Yes     Comment: Soda, QOD; per NG    Exercise No           Current Medications:  Current Facility-Administered Medications   Medication Dose Route Frequency    pravastatin (Pravachol) tab 10 mg  10 mg Oral Nightly    lisinopril (Zestril) tab 10 mg  10 mg Oral Daily    heparin (Porcine) 5000 UNIT/ML injection 5,000 Units  5,000 Units Subcutaneous Q8H CORAL    acetaminophen (Tylenol Extra Strength) tab 500 mg  500 mg Oral Q4H PRN    acetaminophen (Tylenol) tab 650 mg  650 mg Oral Q4H PRN    Or    HYDROcodone-acetaminophen (Norco) 5-325 MG per tab 1 tablet  1 tablet Oral Q4H PRN    Or    HYDROcodone-acetaminophen (Norco) 5-325 MG per tab 2 tablet  2 tablet Oral Q4H PRN    melatonin tab 3 mg  3 mg Oral  Nightly PRN    ipratropium-albuterol (Duoneb) 0.5-2.5 (3) MG/3ML inhalation solution 3 mL  3 mL Nebulization Q6H    ipratropium-albuterol (Duoneb) 0.5-2.5 (3) MG/3ML inhalation solution 3 mL  3 mL Nebulization Q6H PRN    methylPREDNISolone sodium succinate (Solu-MEDROL) injection 40 mg  40 mg Intravenous Q8H    ondansetron (Zofran) 4 MG/2ML injection 4 mg  4 mg Intravenous Q6H PRN    metoclopramide (Reglan) 5 mg/mL injection 10 mg  10 mg Intravenous Q8H PRN    benzonatate (Tessalon) cap 200 mg  200 mg Oral TID PRN    influenza virus trivalent high dose PF (Fluzone HD) 0.5 mL injection (ages >/= 65 years) 0.5 mL  0.5 mL Intramuscular Prior to discharge    traMADol (Ultram) tab 50 mg  50 mg Oral Q6H PRN    azithromycin (Zithromax) 500 mg in sodium chloride 0.9% 250mL IVPB premix  500 mg Intravenous Q24H    guaiFENesin-codeine (Robitussin AC) 100-10 MG/5ML oral solution 5 mL  5 mL Oral Q4H PRN    umeclidinium-vilanterol (Anoro Ellipta) 62.5-25 MCG/ACT inhaler 1 puff  1 puff Inhalation Daily     Medications Prior to Admission   Medication Sig    traMADol 50 MG Oral Tab Take 1-2 tablets ( mg total) by mouth.    lisinopril 10 MG Oral Tab Take 1 tablet (10 mg total) by mouth daily.    simvastatin 20 MG Oral Tab Take 1 tablet (20 mg total) by mouth every evening.    Ascorbic Acid (VITAMIN C) 1000 MG Oral Tab Take 1 tablet (1,000 mg total) by mouth daily.    celecoxib 200 MG Oral Cap Take 1 capsule (200 mg total) by mouth 2 (two) times daily with meals.    acetaminophen 500 MG Oral Tab Take by mouth. (Patient not taking: Reported on 4/23/2024)       Allergies  Allergies[1]    Review of Systems:   Constitutional: denies fevers, chills, weakness, fatigue, recent illness  HEENT: denies headache, sore throat, vision loss  Cardio: denies chest pain, chest pressure, palpitations  Respiratory: dyspnea, cough, wheezing, denies hemoptysis   GI: denies nausea, vomiting, abdominal pain  : denies dysuria,  hematuria  Musculoskeletal: denies arthralgia, myalgia  Integumentary: denies rash, itching  Neurological: denies syncope, weakness, dizziness,   Psychiatric: denies depression, anxiety  Hematologic: denies bruising        Physical Exam:   Blood pressure 121/59, pulse 89, temperature 98.3 °F (36.8 °C), temperature source Oral, resp. rate 20, weight 202 lb 8 oz (91.9 kg), SpO2 93%.    Constitutional: no acute distress  Eyes: PERRL  ENT: nares patent  Neck: neck supple, no JVD  Cardio: RRR, S1 S2  Respiratory: Expiratory Rales present  GI: abdomen soft, non tender, active bowel souds, no organomegaly  Extremities: no clubbing, cyanosis, edema  Neurologic: no gross motor deficits  Skin: warm, dry    Results:   Laboratory Data  Lab Results   Component Value Date    WBC 15.2 (H) 12/04/2024    HGB 14.9 12/04/2024    HCT 43.4 12/04/2024    .0 12/04/2024    CREATSERUM 0.80 12/04/2024    BUN 19 12/04/2024     12/04/2024    K 3.8 12/04/2024     12/04/2024    CO2 32.0 12/04/2024     (H) 12/04/2024    CA 9.3 12/04/2024    ALB 3.9 03/11/2024    ALKPHO 84 03/11/2024    TP 7.0 03/11/2024    AST 20 03/11/2024    ALT 19 03/11/2024    INR 1.03 07/19/2023    PTP 13.4 07/19/2023    TSH 1.963 06/03/2024    PSA 0.8 01/03/2019    DDIMER 0.42 12/04/2024         Imaging  XR CHEST AP PORTABLE  (CPT=71045)    Result Date: 12/5/2024  CONCLUSION:  1. No acute cardiopulmonary process. 2. Stable emphysematous changes.   A preliminary report was issued by the [facility] teleradiology service. There are no major discrepancies.  [facility]  Dictated by (CST): Mack Hardy MD on 12/05/2024 at 6:02 AM     Finalized by (CST): Mack Hardy MD on 12/05/2024 at 6:03 AM           Assessment   1.  Likely COPD with acute exacerbation  2.  Rhinovirus respiratory infection  3.  Acute hypoxemic respiratory failure  4.  Prior nicotine dependence  5.  Leukocytosis  6.  Hypertension    Plan   -Patient presents with  worsening dyspnea likely COPD exacerbation with trigger secondary to rhinovirus respiratory infection  -Chest x-ray on presentation with no significant infiltrate seen  -IV steroids and gradually wean  -Nebulizer treatments  -Will start LABA/LAMA  -Recommend outpatient pulmonary function testing and lung screening CT  -Encouraged tobacco cessation  -Attempt to wean oxygen as tolerated  -Increase activity as tolerated  -May be optimized from pulmonary perspective tomorrow for discharge and continuing to improve  -Reviewed vitals, labs and imaging    Gwen Gonzalez DO  Pulmonary Critical Care Medicine  [facility]  12/5/2024  2:09 PM        [1] No Known Allergies

## 2024-12-05 NOTE — PLAN OF CARE
Problem: Patient Centered Care  Goal: Patient preferences are identified and integrated in the patient's plan of care  Description: Interventions:  - What would you like us to know as we care for you? I live alone  - Provide timely, complete, and accurate information to patient/family  - Incorporate patient and family knowledge, values, beliefs, and cultural backgrounds into the planning and delivery of care  - Encourage patient/family to participate in care and decision-making at the level they choose  - Honor patient and family perspectives and choices  Outcome: Progressing     Problem: Patient/Family Goals  Goal: Patient/Family Long Term Goal  Description: Patient's Long Term Goal: Discharge from hospital    Interventions:  - Monitor vital signs  - Monitor appropriate labs  - Pain management  - Oxygen and respiratory intervention as needed  - Administer medications per order  - Follow MD orders  - Diagnostics per order  - Update / inform patient and family on plan of care  - Discharge planning  - See additional Care Plan goals for specific interventions  Outcome: Progressing  Goal: Patient/Family Short Term Goal  Description: Patient's Short Term Goal: Improve cough and breathing    Interventions:   - Monitor vital signs  - Monitor appropriate labs  - Pain management  - Oxygen and respiratory intervention as needed  - Administer medications per order  - Follow MD orders  - Diagnostics per order  - Update / inform patient and family on plan of care  - Discharge planning  - See additional Care Plan goals for specific interventions  Outcome: Progressing     Problem: CARDIOVASCULAR - ADULT  Goal: Absence of cardiac arrhythmias or at baseline  Description: INTERVENTIONS:  - Continuous cardiac monitoring, monitor vital signs, obtain 12 lead EKG if indicated  - Evaluate effectiveness of antiarrhythmic and heart rate control medications as ordered  - Initiate emergency measures for life threatening arrhythmias  -  Monitor electrolytes and administer replacement therapy as ordered  Outcome: Progressing     Problem: RESPIRATORY - ADULT  Goal: Achieves optimal ventilation and oxygenation  Description: INTERVENTIONS:  - Assess for changes in respiratory status  - Assess for changes in mentation and behavior  - Position to facilitate oxygenation and minimize respiratory effort  - Oxygen supplementation based on oxygen saturation or ABGs  - Provide Smoking Cessation handout, if applicable  - Encourage broncho-pulmonary hygiene including cough, deep breathe, Incentive Spirometry  - Assess the need for suctioning and perform as needed  - Assess and instruct to report SOB or any respiratory difficulty  - Respiratory Therapy support as indicated  - Manage/alleviate anxiety  - Monitor for signs/symptoms of CO2 retention  Outcome: Progressing     Problem: METABOLIC/FLUID AND ELECTROLYTES - ADULT  Goal: Electrolytes maintained within normal limits  Description: INTERVENTIONS:  - Monitor labs and rhythm and assess patient for signs and symptoms of electrolyte imbalances  - Administer electrolyte replacement as ordered  - Monitor response to electrolyte replacements, including rhythm and repeat lab results as appropriate  - Fluid restriction as ordered  - Instruct patient on fluid and nutrition restrictions as appropriate  Outcome: Progressing     Problem: PAIN - ADULT  Goal: Verbalizes/displays adequate comfort level or patient's stated pain goal  Description: INTERVENTIONS:  - Encourage pt to monitor pain and request assistance  - Assess pain using appropriate pain scale  - Administer analgesics based on type and severity of pain and evaluate response  - Implement non-pharmacological measures as appropriate and evaluate response  - Consider cultural and social influences on pain and pain management  - Manage/alleviate anxiety  - Utilize distraction and/or relaxation techniques  - Monitor for opioid side effects  - Notify MD/LIP if  interventions unsuccessful or patient reports new pain  - Anticipate increased pain with activity and pre-medicate as appropriate  Outcome: Progressing     Problem: RISK FOR INFECTION - ADULT  Goal: Absence of fever/infection during anticipated neutropenic period  Description: INTERVENTIONS  - Monitor WBC  - Administer growth factors as ordered  - Implement neutropenic guidelines  Outcome: Progressing     Problem: SAFETY ADULT - FALL  Goal: Free from fall injury  Description: INTERVENTIONS:  - Assess pt frequently for physical needs  - Identify cognitive and physical deficits and behaviors that affect risk of falls.  - [place] fall precautions as indicated by assessment.  - Educate pt/family on patient safety including physical limitations  - Instruct pt to call for assistance with activity based on assessment  - Modify environment to reduce risk of injury  - Provide assistive devices as appropriate  - Consider OT/PT consult to assist with strengthening/mobility  - Encourage toileting schedule  Outcome: Progressing     Problem: DISCHARGE PLANNING  Goal: Discharge to home or other facility with appropriate resources  Description: INTERVENTIONS:  - Identify barriers to discharge w/pt and caregiver  - Include patient/family/discharge partner in discharge planning  - Arrange for needed discharge resources and transportation as appropriate  - Identify discharge learning needs (meds, wound care, etc)  - Arrange for interpreters to assist at discharge as needed  - Consider post-discharge preferences of patient/family/discharge partner  - Complete POLST form as appropriate  - Assess patient's ability to be responsible for managing their own health  - Refer to Case Management Department for coordinating discharge planning if the patient needs post-hospital services based on physician/LIP order or complex needs related to functional status, cognitive ability or social support system  Outcome: Progressing

## 2024-12-05 NOTE — RESPIRATORY THERAPY NOTE
Patient to be placed on CPAP No  Comments: Patient doesn't wear a CPAP at home so he wont be wearing one during his hospital stay.

## 2024-12-05 NOTE — ED QUICK NOTES
Orders for admission, patient is aware of plan and ready to go upstairs. Any questions, please call ED RN Josey at extension [phone].     Patient Covid vaccination status: Fully vaccinated     COVID Test Ordered in ED: SARS-CoV-2/Flu A and B/RSV by PCR (GeneXpert)    COVID Suspicion at Admission: N/A    Running Infusions:      Mental Status/LOC at time of transport: A/Ox4    Other pertinent information:   CIWA score: N/A   NIH score:  N/A

## 2024-12-05 NOTE — PROGRESS NOTES
Progress Note     Enmanuel Lara Patient Status:  Observation    1950 MRN [id number]   Location [facility]5W Attending Maryam Wilhelm MD   Hosp Day # 0 PCP Betito Garcia MD     Chief Complaint: AECOPD, entero rhino virus    Subjective:   S: Patient feeling better but having persistent cough  Down from 4 to 2L    No other acute issues  Quit smoking 4 months ago    Review of Systems:   10 point ROS completed and was negative, except for pertinent positive and negatives stated in subjective.    Objective:   Vital signs:  Temp:  [97.5 °F (36.4 °C)-98.1 °F (36.7 °C)] 98 °F (36.7 °C)  Pulse:  [] 97  Resp:  [17-28] 20  BP: ()/() 114/59  SpO2:  [90 %-100 %] 93 %    Wt Readings from Last 6 Encounters:   24 202 lb 8 oz (91.9 kg)   10/14/24 207 lb (93.9 kg)   24 208 lb (94.3 kg)   04/15/24 204 lb (92.5 kg)   24 204 lb (92.5 kg)   10/16/23 203 lb (92.1 kg)         Physical Exam:    General: No acute distress. Alert ,        on 2L minimal conversational dyspnea  Respiratory: minimal expiratory wheezing  bilaterally.  Poor air exchange  No rhonchi.  Cardiovascular: S1, S2. Regular rate and rhythm. No murmurs, rubs or gallops.   Abdomen: Soft, nontender, nondistended.  Positive bowel sounds. No rebound or guarding.  Neurologic: No focal neurological deficits.   Musculoskeletal: Moves all extremities.  Extremities: No edema.    Results:   Diagnostic Data:      Labs:    Labs Last 24 Hours:   BMP     CBC    Other     Na 140 Cl 102 BUN 19 Glu 126   Hb 14.9   PTT - Procal <0.04   K 3.8 CO2 32.0 Cr 0.80   WBC 15.2 >< .0  INR - CRP -   Renal Lytes Endo    Hct 43.4   Trop - D dim 0.42   eGFR - Ca 9.3 POC Gluc  -    LFT   pBNP 398 Lactic 2.8   eGFR AA - PO4 - A1c -   AST - APk - Prot -  LDL -     Mg - TSH -   ALT - T tereza - Alb -        COVID-19 Lab Results    COVID-19  Lab Results   Component Value Date    COVID19 Not Detected 2024    COVID19 Not Detected 2024     COVID19 Not Detected 04/08/2022       Pro-Calcitonin  Recent Labs   Lab 12/05/24  0150   PCT <0.04       Cardiac  Recent Labs   Lab 12/04/24 2138   PBNP 398       Creatinine Kinase  No results for input(s): \"CK\" in the last 168 hours.    Inflammatory Markers  Recent Labs   Lab 12/04/24 2138   DDIMER 0.42       Imaging: Imaging data reviewed in Epic.    Medications:    pravastatin  10 mg Oral Nightly    lisinopril  10 mg Oral Daily    heparin  5,000 Units Subcutaneous Q8H CORAL    ipratropium-albuterol  3 mL Nebulization Q6H    methylPREDNISolone  40 mg Intravenous Q8H    azithromycin  500 mg Intravenous Q24H       Assessment & Plan:   ASSESSMENT / PLAN:     COPD exacerbation 2/2 entero and rhino virus/Acute hypoxic respiratory failure  Ho 2ppd x 45 years- quit 4 months ago  Does not have pulmonologist established   CXR neg for pna, PCT neg  -Steroids, nebs, antibiotics  Pulmonary consult to establish care  Wean off O2 as able       Leukocytosis, no obvious source of infection  -Monitor  -Doubt sepsis with no source of infection.  -LA down trending  -On antibiotics for COPD     Sinus tachycardia, likely due to dyspnea  -Monitor on telemetry     HTN  HLD  PVD  Obesity  -Resume home meds when reconciled     Quality:  DVT Prophylaxis: Heparin  CODE status: Full code      Coordinated care with providers and counseling re: treatment plan and workup     Maryam Wilhelm MD    Supplementary Documentation:

## 2024-12-05 NOTE — ED QUICK NOTES
Rounding Completed    Plan of Care reviewed. Waiting for lab results.  Elimination needs assessed.  Provided medication     Bed is locked and in lowest position. Call light within reach.

## 2024-12-05 NOTE — ED QUICK NOTES
Noticed patient heart jump on monitor to 140s-150s, sinus tachy with PACs. Notified Dr. Wilkins.

## 2024-12-05 NOTE — ED QUICK NOTES
Rounding Completed    Plan of Care reviewed. Waiting for Bed assignment.  Elimination needs assessed.  Provided IV antibiotics and IV fluids.    Bed is locked and in lowest position. Call light within reach.

## 2024-12-06 LAB
ANION GAP SERPL CALC-SCNC: 5 MMOL/L (ref 0–18)
BASOPHILS # BLD AUTO: 0.12 X10(3) UL (ref 0–0.2)
BASOPHILS NFR BLD AUTO: 0.4 %
BUN BLD-MCNC: 18 MG/DL (ref 9–23)
BUN/CREAT SERPL: 23.7 (ref 10–20)
CALCIUM BLD-MCNC: 8.6 MG/DL (ref 8.7–10.4)
CHLORIDE SERPL-SCNC: 107 MMOL/L (ref 98–112)
CO2 SERPL-SCNC: 28 MMOL/L (ref 21–32)
CREAT BLD-MCNC: 0.76 MG/DL
DEPRECATED RDW RBC AUTO: 46.4 FL (ref 35.1–46.3)
EGFRCR SERPLBLD CKD-EPI 2021: 94 ML/MIN/1.73M2 (ref 60–?)
EOSINOPHIL # BLD AUTO: 0 X10(3) UL (ref 0–0.7)
EOSINOPHIL NFR BLD AUTO: 0 %
ERYTHROCYTE [DISTWIDTH] IN BLOOD BY AUTOMATED COUNT: 13.1 % (ref 11–15)
GLUCOSE BLD-MCNC: 181 MG/DL (ref 70–99)
HCT VFR BLD AUTO: 37.9 %
HGB BLD-MCNC: 13.3 G/DL
IMM GRANULOCYTES # BLD AUTO: 0.25 X10(3) UL (ref 0–1)
IMM GRANULOCYTES NFR BLD: 0.8 %
LYMPHOCYTES # BLD AUTO: 1.22 X10(3) UL (ref 1–4)
LYMPHOCYTES NFR BLD AUTO: 4.1 %
MCH RBC QN AUTO: 33.8 PG (ref 26–34)
MCHC RBC AUTO-ENTMCNC: 35.1 G/DL (ref 31–37)
MCV RBC AUTO: 96.2 FL
MONOCYTES # BLD AUTO: 0.92 X10(3) UL (ref 0.1–1)
MONOCYTES NFR BLD AUTO: 3.1 %
NEUTROPHILS # BLD AUTO: 27.28 X10 (3) UL (ref 1.5–7.7)
NEUTROPHILS # BLD AUTO: 27.28 X10(3) UL (ref 1.5–7.7)
NEUTROPHILS NFR BLD AUTO: 91.6 %
OSMOLALITY SERPL CALC.SUM OF ELEC: 296 MOSM/KG (ref 275–295)
PLATELET # BLD AUTO: 245 10(3)UL (ref 150–450)
POTASSIUM SERPL-SCNC: 4.2 MMOL/L (ref 3.5–5.1)
POTASSIUM SERPL-SCNC: 4.6 MMOL/L (ref 3.5–5.1)
RBC # BLD AUTO: 3.94 X10(6)UL
SODIUM SERPL-SCNC: 140 MMOL/L (ref 136–145)
WBC # BLD AUTO: 29.8 X10(3) UL (ref 4–11)

## 2024-12-06 PROCEDURE — 99233 SBSQ HOSP IP/OBS HIGH 50: CPT | Performed by: INTERNAL MEDICINE

## 2024-12-06 PROCEDURE — 99232 SBSQ HOSP IP/OBS MODERATE 35: CPT | Performed by: INTERNAL MEDICINE

## 2024-12-06 RX ORDER — METHYLPREDNISOLONE SODIUM SUCCINATE 40 MG/ML
40 INJECTION INTRAMUSCULAR; INTRAVENOUS EVERY 12 HOURS
Status: DISCONTINUED | OUTPATIENT
Start: 2024-12-06 | End: 2024-12-07

## 2024-12-06 NOTE — PROGRESS NOTES
[facility]  part of [facility]     Progress Note    Enmanuel Lara Patient Status:  Observation    1950 MRN [id number]   Location [facility]5W Attending Maryam Wilhelm MD   Hosp Day # 0 PCP Betito Garcia MD       Subjective:   Patient seen and examined.  Dyspnea gradually improving.  Some occasional cough.    Objective:   Blood pressure 126/73, pulse 108, temperature 98.2 °F (36.8 °C), resp. rate 22, weight 202 lb 8 oz (91.9 kg), SpO2 91%.  Intake/Output:   Last 3 shifts: I/O last 3 completed shifts:  In: 1080 [P.O.:510; I.V.:20; IV PIGGYBACK:550]  Out: -    This shift: No intake/output data recorded.     Vent Settings:      Hemodynamic parameters (last 24 hours):      Scheduled Meds:   Current Facility-Administered Medications   Medication Dose Route Frequency    pravastatin (Pravachol) tab 10 mg  10 mg Oral Nightly    lisinopril (Zestril) tab 10 mg  10 mg Oral Daily    heparin (Porcine) 5000 UNIT/ML injection 5,000 Units  5,000 Units Subcutaneous Q8H CORAL    acetaminophen (Tylenol Extra Strength) tab 500 mg  500 mg Oral Q4H PRN    acetaminophen (Tylenol) tab 650 mg  650 mg Oral Q4H PRN    Or    HYDROcodone-acetaminophen (Norco) 5-325 MG per tab 1 tablet  1 tablet Oral Q4H PRN    Or    HYDROcodone-acetaminophen (Norco) 5-325 MG per tab 2 tablet  2 tablet Oral Q4H PRN    melatonin tab 3 mg  3 mg Oral Nightly PRN    ipratropium-albuterol (Duoneb) 0.5-2.5 (3) MG/3ML inhalation solution 3 mL  3 mL Nebulization Q6H    ipratropium-albuterol (Duoneb) 0.5-2.5 (3) MG/3ML inhalation solution 3 mL  3 mL Nebulization Q6H PRN    methylPREDNISolone sodium succinate (Solu-MEDROL) injection 40 mg  40 mg Intravenous Q8H    ondansetron (Zofran) 4 MG/2ML injection 4 mg  4 mg Intravenous Q6H PRN    metoclopramide (Reglan) 5 mg/mL injection 10 mg  10 mg Intravenous Q8H PRN    benzonatate (Tessalon) cap 200 mg  200 mg Oral TID PRN    influenza virus trivalent high dose PF (Fluzone HD) 0.5 mL  injection (ages >/= 65 years) 0.5 mL  0.5 mL Intramuscular Prior to discharge    traMADol (Ultram) tab 50 mg  50 mg Oral Q6H PRN    azithromycin (Zithromax) 500 mg in sodium chloride 0.9% 250mL IVPB premix  500 mg Intravenous Q24H    guaiFENesin-codeine (Robitussin AC) 100-10 MG/5ML oral solution 5 mL  5 mL Oral Q4H PRN    umeclidinium-vilanterol (Anoro Ellipta) 62.5-25 MCG/ACT inhaler 1 puff  1 puff Inhalation Daily       Continuous Infusions:     Physical Exam  Constitutional: no acute distress  Eyes: PERRL  ENT: nares pateint  Neck: supple, no JVD  Cardio: RRR, S1 S2  Respiratory: Expiratory Rales  GI: abdomen soft, non tender, active bowel sounds, no organomegaly  Extremities: no clubbing, cyanosis, edema  Neurologic: no gross motor deficits  Skin: warm, dry      Results:     Lab Results   Component Value Date    K 4.2 12/06/2024       XR CHEST AP PORTABLE  (CPT=71045)    Result Date: 12/5/2024  CONCLUSION:  1. No acute cardiopulmonary process. 2. Stable emphysematous changes.   A preliminary report was issued by the [facility] [facility] teleradiology service. There are no major discrepancies.  [facility]  Dictated by (CST): Mack Hardy MD on 12/05/2024 at 6:02 AM     Finalized by (CST): Mack Hardy MD on 12/05/2024 at 6:03 AM           EKG    Result Date: 12/5/2024  Sinus tachycardia Otherwise normal ECG When compared with ECG of 04-DEC-2024 20:45, Premature atrial complexes are no longer Present Confirmed by yulia colon ([id number]) on 12/5/2024 4:06:11 PM    EKG 12 Lead    Result Date: 12/5/2024  Sinus tachycardia with Premature atrial complexes Otherwise normal ECG When compared with ECG of 19-JUL-2023 11:46, Premature atrial complexes are now Present Confirmed by yulia colon ([id number]) on 12/5/2024 4:00:49 PM     Assessment   1.  Likely COPD with acute exacerbation  2.  Rhinovirus respiratory infection  3.  Acute hypoxemic respiratory failure  4.  Prior nicotine dependence  5.  Leukocytosis  6.   Hypertension     Plan   -Patient presents with worsening dyspnea likely COPD exacerbation with trigger secondary to rhinovirus respiratory infection  -Chest x-ray on presentation with no significant infiltrate seen  -IV steroids and gradually wean  -Nebulizer treatments  -Will start LABA/LAMA  -Recommend outpatient pulmonary function testing and lung screening CT  -Encouraged tobacco cessation  -Attempt to wean oxygen as tolerated  -Increase activity as tolerated  -Able to be weaned off oxygen okay for discharge from pulmonary perspective later today with tapering course of prednisone over next week.  Recommend Anoro upon discharge.  Follow-up in pulmonary clinic in 2 weeks time    Gwen Gonzalez DO  Pulmonary Critical Care Medicine  [facility]

## 2024-12-06 NOTE — PLAN OF CARE
Problem: Patient Centered Care  Goal: Patient preferences are identified and integrated in the patient's plan of care  Description: Interventions:  - What would you like us to know as we care for you?   - Provide timely, complete, and accurate information to patient/family  - Incorporate patient and family knowledge, values, beliefs, and cultural backgrounds into the planning and delivery of care  - Encourage patient/family to participate in care and decision-making at the level they choose  - Honor patient and family perspectives and choices  12/6/2024 0641 by Olga Brannon RN  Outcome: Progressing  12/6/2024 0640 by Olga Brannon RN  Outcome: Progressing  12/6/2024 0639 by Olga Brannon RN  Outcome: Progressing      Problem: CARDIOVASCULAR - ADULT  Goal: Absence of cardiac arrhythmias or at baseline  Description: INTERVENTIONS:  - Continuous cardiac monitoring, monitor vital signs, obtain 12 lead EKG if indicated  - Evaluate effectiveness of antiarrhythmic and heart rate control medications as ordered  - Initiate emergency measures for life threatening arrhythmias  - Monitor electrolytes and administer replacement therapy as ordered  12/6/2024 0641 by Olga Brannon RN  Outcome: Progressing  12/6/2024 0640 by Olga Brannon RN  Outcome: Progressing  12/6/2024 0639 by Olga Brannon RN  Outcome: Progressing     Problem: RESPIRATORY - ADULT  Goal: Achieves optimal ventilation and oxygenation  Description: INTERVENTIONS:  - Assess for changes in respiratory status  - Assess for changes in mentation and behavior  - Position to facilitate oxygenation and minimize respiratory effort  - Oxygen supplementation based on oxygen saturation or ABGs  - Provide Smoking Cessation handout, if applicable  - Encourage broncho-pulmonary hygiene including cough, deep breathe, Incentive Spirometry  - Assess the need for suctioning and perform as needed  - Assess and instruct to report SOB or any  respiratory difficulty  - Respiratory Therapy support as indicated  - Manage/alleviate anxiety  - Monitor for signs/symptoms of CO2 retention  12/6/2024 0641 by Olga Brannon RN  Outcome: Progressing  12/6/2024 0640 by Olga Brannon RN  Outcome: Progressing  12/6/2024 0639 by Olga Brannon RN  Outcome: Progressing     Problem: METABOLIC/FLUID AND ELECTROLYTES - ADULT  Goal: Electrolytes maintained within normal limits  Description: INTERVENTIONS:  - Monitor labs and rhythm and assess patient for signs and symptoms of electrolyte imbalances  - Administer electrolyte replacement as ordered  - Monitor response to electrolyte replacements, including rhythm and repeat lab results as appropriate  - Fluid restriction as ordered  - Instruct patient on fluid and nutrition restrictions as appropriate  12/6/2024 0641 by Olga Brannon RN  Outcome: Progressing  12/6/2024 0640 by Olga Brannon RN  Outcome: Progressing  12/6/2024 0639 by Olga Brannon RN  Outcome: Progressing     Problem: PAIN - ADULT  Goal: Verbalizes/displays adequate comfort level or patient's stated pain goal  Description: INTERVENTIONS:  - Encourage pt to monitor pain and request assistance  - Assess pain using appropriate pain scale  - Administer analgesics based on type and severity of pain and evaluate response  - Implement non-pharmacological measures as appropriate and evaluate response  - Consider cultural and social influences on pain and pain management  - Manage/alleviate anxiety  - Utilize distraction and/or relaxation techniques  - Monitor for opioid side effects  - Notify MD/LIP if interventions unsuccessful or patient reports new pain  - Anticipate increased pain with activity and pre-medicate as appropriate  12/6/2024 0641 by Olga Brannon RN  Outcome: Progressing  12/6/2024 0640 by Olga Brannon RN  Outcome: Progressing  12/6/2024 0639 by Olga Brannon RN  Outcome: Progressing     Problem:  RISK FOR INFECTION - ADULT  Goal: Absence of fever/infection during anticipated neutropenic period  Description: INTERVENTIONS  - Monitor WBC  - Administer growth factors as ordered  - Implement neutropenic guidelines  12/6/2024 0641 by Olga Brannon RN  Outcome: Progressing  12/6/2024 0640 by Olga Brannon RN  Outcome: Progressing  12/6/2024 0639 by Olga Brannon RN  Outcome: Progressing     Problem: SAFETY ADULT - FALL  Goal: Free from fall injury  Description: INTERVENTIONS:  - Assess pt frequently for physical needs  - Identify cognitive and physical deficits and behaviors that affect risk of falls.  - [place] fall precautions as indicated by assessment.  - Educate pt/family on patient safety including physical limitations  - Instruct pt to call for assistance with activity based on assessment  - Modify environment to reduce risk of injury  - Provide assistive devices as appropriate  - Consider OT/PT consult to assist with strengthening/mobility  - Encourage toileting schedule  12/6/2024 0641 by Olga Brannon RN  Outcome: Progressing  12/6/2024 0640 by Olga Brannon RN  Outcome: Progressing  12/6/2024 0639 by Olga Brannon RN  Outcome: Progressing     Problem: DISCHARGE PLANNING  Goal: Discharge to home or other facility with appropriate resources  Description: INTERVENTIONS:  - Identify barriers to discharge w/pt and caregiver  - Include patient/family/discharge partner in discharge planning  - Arrange for needed discharge resources and transportation as appropriate  - Identify discharge learning needs (meds, wound care, etc)  - Arrange for interpreters to assist at discharge as needed  - Consider post-discharge preferences of patient/family/discharge partner  - Complete POLST form as appropriate  - Assess patient's ability to be responsible for managing their own health  - Refer to Case Management Department for coordinating discharge planning if the patient needs  post-hospital services based on physician/LIP order or complex needs related to functional status, cognitive ability or social support system  12/6/2024 0641 by Olga Brannon RN  Outcome: Progressing  12/6/2024 0640 by Olga Brannon RN  Outcome: Progressing  12/6/2024 0639 by Olga Brannon RN  Outcome: Progressing

## 2024-12-06 NOTE — PLAN OF CARE
Problem: CARDIOVASCULAR - ADULT  Goal: Absence of cardiac arrhythmias or at baseline  Description: INTERVENTIONS:  - Continuous cardiac monitoring, monitor vital signs, obtain 12 lead EKG if indicated  - Evaluate effectiveness of antiarrhythmic and heart rate control medications as ordered  - Initiate emergency measures for life threatening arrhythmias  - Monitor electrolytes and administer replacement therapy as ordered  Outcome: Progressing     Problem: RESPIRATORY - ADULT  Goal: Achieves optimal ventilation and oxygenation  Description: INTERVENTIONS:  - Assess for changes in respiratory status  - Assess for changes in mentation and behavior  - Position to facilitate oxygenation and minimize respiratory effort  - Oxygen supplementation based on oxygen saturation or ABGs  - Provide Smoking Cessation handout, if applicable  - Encourage broncho-pulmonary hygiene including cough, deep breathe, Incentive Spirometry  - Assess the need for suctioning and perform as needed  - Assess and instruct to report SOB or any respiratory difficulty  - Respiratory Therapy support as indicated  - Manage/alleviate anxiety  - Monitor for signs/symptoms of CO2 retention  Outcome: Progressing     Problem: METABOLIC/FLUID AND ELECTROLYTES - ADULT  Goal: Electrolytes maintained within normal limits  Description: INTERVENTIONS:  - Monitor labs and rhythm and assess patient for signs and symptoms of electrolyte imbalances  - Administer electrolyte replacement as ordered  - Monitor response to electrolyte replacements, including rhythm and repeat lab results as appropriate  - Fluid restriction as ordered  - Instruct patient on fluid and nutrition restrictions as appropriate  Outcome: Progressing     Problem: PAIN - ADULT  Goal: Verbalizes/displays adequate comfort level or patient's stated pain goal  Description: INTERVENTIONS:  - Encourage pt to monitor pain and request assistance  - Assess pain using appropriate pain scale  - Administer  analgesics based on type and severity of pain and evaluate response  - Implement non-pharmacological measures as appropriate and evaluate response  - Consider cultural and social influences on pain and pain management  - Manage/alleviate anxiety  - Utilize distraction and/or relaxation techniques  - Monitor for opioid side effects  - Notify MD/LIP if interventions unsuccessful or patient reports new pain  - Anticipate increased pain with activity and pre-medicate as appropriate  Outcome: Progressing     Problem: RISK FOR INFECTION - ADULT  Goal: Absence of fever/infection during anticipated neutropenic period  Description: INTERVENTIONS  - Monitor WBC  - Administer growth factors as ordered  - Implement neutropenic guidelines  Outcome: Progressing     Problem: Patient Centered Care  Goal: Patient preferences are identified and integrated in the patient's plan of care  Description: Interventions:  - What would you like us to know as we care for you?   - Provide timely, complete, and accurate information to patient/family  - Incorporate patient and family knowledge, values, beliefs, and cultural backgrounds into the planning and delivery of care  - Encourage patient/family to participate in care and decision-making at the level they choose  - Honor patient and family perspectives and choices  Outcome: Progressing

## 2024-12-06 NOTE — PROGRESS NOTES
Progress Note     Enmanuel Lara Patient Status:  Observation    1950 MRN [id number]   Location [facility]5W Attending Maryam Wilhelm MD   Hosp Day # 0 PCP Betito Garcia MD     Chief Complaint: AECOPD, entero rhino virus    Subjective:   S: Patient feeling better, cough improved  Down from 4 to 2L    No other acute issues  Quit smoking 4 months ago    Review of Systems:   10 point ROS completed and was negative, except for pertinent positive and negatives stated in subjective.    Objective:   Vital signs:  Temp:  [98.2 °F (36.8 °C)-99.8 °F (37.7 °C)] 98.2 °F (36.8 °C)  Pulse:  [] 108  Resp:  [20-24] 22  BP: ()/(45-76) 126/73  SpO2:  [83 %-99 %] 90 %    Wt Readings from Last 6 Encounters:   24 202 lb 8 oz (91.9 kg)   10/14/24 207 lb (93.9 kg)   24 208 lb (94.3 kg)   04/15/24 204 lb (92.5 kg)   24 204 lb (92.5 kg)   10/16/23 203 lb (92.1 kg)         Physical Exam:    General: No acute distress. Alert ,        on 2L minimal conversational dyspnea  Respiratory: minimal expiratory wheezing  bilaterally.  Poor air exchange  No rhonchi.  Cardiovascular: S1, S2. Regular rate and rhythm. No murmurs, rubs or gallops.   Abdomen: Soft, nontender, nondistended.  Positive bowel sounds. No rebound or guarding.  Neurologic: No focal neurological deficits.   Musculoskeletal: Moves all extremities.  Extremities: No edema.    Results:   Diagnostic Data:      Labs:    Labs Last 24 Hours:   BMP     CBC    Other     Na - Cl - BUN - Glu -   Hb 13.3   PTT - Procal -   K 4.2 CO2 - Cr -   WBC 29.8 >< .0  INR - CRP -   Renal Lytes Endo    Hct 37.9   Trop - D dim -   eGFR - Ca - POC Gluc  -    LFT   pBNP - Lactic -   eGFR AA - PO4 - A1c -   AST - APk - Prot -  LDL -     Mg - TSH -   ALT - T tereza - Alb -        COVID-19 Lab Results    COVID-19  Lab Results   Component Value Date    COVID19 Not Detected 2024    COVID19 Not Detected 2024    COVID19 Not Detected 2022        Pro-Calcitonin  Recent Labs   Lab 12/05/24  0150   PCT <0.04       Cardiac  Recent Labs   Lab 12/04/24 2138   PBNP 398       Creatinine Kinase  No results for input(s): \"CK\" in the last 168 hours.    Inflammatory Markers  Recent Labs   Lab 12/04/24 2138   DDIMER 0.42       Imaging: Imaging data reviewed in Epic.    Medications:    methylPREDNISolone  40 mg Intravenous Q12H    pravastatin  10 mg Oral Nightly    lisinopril  10 mg Oral Daily    heparin  5,000 Units Subcutaneous Q8H CORAL    ipratropium-albuterol  3 mL Nebulization Q6H    azithromycin  500 mg Intravenous Q24H    umeclidinium-vilanterol  1 puff Inhalation Daily       Assessment & Plan:   ASSESSMENT / PLAN:     COPD exacerbation 2/2 entero and rhino virus/Acute hypoxic respiratory failure  Ho 2ppd x 45 years- quit 4 months ago  Does not have pulmonologist established   CXR neg for pna, PCT neg  -Steroids, nebs, antibiotics  Pulmonary consult to establish care appr input  Wean off O2 as able- home when weaned off O2       Leukocytosis, no obvious source of infection  -Monitor  -Doubt sepsis with no source of infection.  -LA down trending  -On antibiotics for COPD     Sinus tachycardia, likely due to dyspnea  -Monitor on telemetry     HTN  HLD  PVD  Obesity  -Resume home meds when reconciled     Quality:  DVT Prophylaxis: Heparin  CODE status: Full code      Coordinated care with providers and counseling re: treatment plan and workup     Maryam Wilhelm MD    Supplementary Documentation:

## 2024-12-07 PROCEDURE — 99233 SBSQ HOSP IP/OBS HIGH 50: CPT | Performed by: INTERNAL MEDICINE

## 2024-12-07 RX ORDER — PREDNISONE 20 MG/1
40 TABLET ORAL
Status: DISCONTINUED | OUTPATIENT
Start: 2024-12-07 | End: 2024-12-08

## 2024-12-07 RX ORDER — IPRATROPIUM BROMIDE AND ALBUTEROL SULFATE 2.5; .5 MG/3ML; MG/3ML
3 SOLUTION RESPIRATORY (INHALATION)
Status: DISCONTINUED | OUTPATIENT
Start: 2024-12-07 | End: 2024-12-08

## 2024-12-07 NOTE — PROGRESS NOTES
Pulmonary Medicine Inpatient Progress Note                 Subjective:  Afebrile on NC  Feels ok. No sign overnight events.        ALLERGIES:  Allergies[1]     MEDS:  Home Medications:  Medications Taking[2]  Scheduled Medication:   methylPREDNISolone  40 mg Intravenous Q12H    pravastatin  10 mg Oral Nightly    lisinopril  10 mg Oral Daily    heparin  5,000 Units Subcutaneous Q8H CORAL    ipratropium-albuterol  3 mL Nebulization Q6H    umeclidinium-vilanterol  1 puff Inhalation Daily     Continuous Infusing Medication:    PRN Medications:    acetaminophen    acetaminophen **OR** HYDROcodone-acetaminophen **OR** HYDROcodone-acetaminophen    melatonin    ipratropium-albuterol    ondansetron    metoclopramide    benzonatate    influenza virus vaccine PF    traMADol    guaiFENesin-codeine       PHYSICAL EXAM:  /59 (BP Location: Left arm)   Pulse 90   Temp 98.6 °F (37 °C) (Oral)   Resp 20   Wt 202 lb 8 oz (91.9 kg)   SpO2 94%   BMI 30.79 kg/m²   CONSTITUTIONAL: alert, oriented, no apparent distress  HEENT: atraumatic normocephalic  MOUTH: mucous membranes are moist. No OP exudates  NECK/THROAT: no JVD. Trachea midline. No obvious thyromegaly  LUNG: diminished but mostly lear b/l no wheezing, crackles. Chest symmetric with respiratory motion  HEART: regular rate and rhythm, no obvious murmers or gallops note  ABD: soft non tender. + bowel sounds. No organomegaly noted  EXT: no clubbing, cyanosis, or edema noted. Pulses intact grossly  NEURO/MUSCULOSKELETAL: no gross deficits  SKIN: warm, dry. No obvious lesions noted  LYMPH: no obvious LAD       IMAGES:  CXR 12/5/24  1. No acute cardiopulmonary process.   2. Stable emphysematous changes.       LABS:  Recent Labs   Lab 12/04/24 2138 12/06/24  0456   RBC 4.63 3.94   HGB 14.9 13.3   HCT 43.4 37.9*   MCV 93.7 96.2   MCH 32.2 33.8   MCHC 34.3 35.1   RDW 12.9 13.1   NEPRELIM 12.06* 27.28*   WBC 15.2* 29.8*   .0 245.0       Recent Labs   Lab 12/04/24 2138  12/05/24  0740 12/06/24  0457   * 181*  --    BUN 19 18  --    CREATSERUM 0.80 0.76  --    EGFRCR 92 94  --    CA 9.3 8.6*  --     140  --    K 3.8 4.6 4.2    107  --    CO2 32.0 28.0  --      Viral panel + for rhino/enterovirus       ASSESSMENT/PLAN:  Probable AECOPD triggered by viral infection  -CXR w/o obvious infiltrates to suggest CAP  -continue anoro daily, duonebs  -on solumedrol-switch to prednisone 40 mg daily x 5 days  -supp 02 PRN. Recommend desat screen b/f discharge    Smoker  - on cessation  -nicotine patch if ready to quit  -outpt f/u with Dr. Gonzalez in 2-3 weeks for PFTS, LDCT etc    Proph:  -DVT: hep subcutaneous    Dispo:  -pt would like to go home today. Ok from pulm standpt     Thank you for the opportunity to care for Enmanuel Patel DO, MPH  Pulmonary Critical Care Medicine  [facility]          [1] No Known Allergies  [2]   Outpatient Medications Marked as Taking for the 12/4/24 encounter (Hospital Encounter)   Medication Sig Dispense Refill    traMADol 50 MG Oral Tab Take 1-2 tablets ( mg total) by mouth.      lisinopril 10 MG Oral Tab Take 1 tablet (10 mg total) by mouth daily. 90 tablet 3    simvastatin 20 MG Oral Tab Take 1 tablet (20 mg total) by mouth every evening. 90 tablet 3    Ascorbic Acid (VITAMIN C) 1000 MG Oral Tab Take 1 tablet (1,000 mg total) by mouth daily.

## 2024-12-07 NOTE — PROGRESS NOTES
[facility]  part of [facility]    Progress Note    Enmanuel Lara Patient Status:  Observation    1950 MRN [id number]   Location [facility]5W Attending Beth Soriano MD   Hosp Day # 0 PCP Betito Garcia MD     Chief Complaint:     COPD exacerbation    Subjective:   Subjective:    Patient seen and examined this morning  Tachycardic, afebrile, respiratory status stable    Objective:   Blood pressure 145/82, pulse 99, temperature 98 °F (36.7 °C), temperature source Oral, resp. rate 20, weight 202 lb 8 oz (91.9 kg), SpO2 90%.  Physical Exam    General: Patient is alert and oriented x3 does not appear to be in acute distress at this time  HEENT: EOMI PERRLA, atraumatic normocephalic  Cardiac: S1-S2 appreciated  Lungs: Good air entry bilaterally clear to auscultation  Abdomen: Soft nontender nondistended positive bowel sounds  Ext: Peripheral pulses are positive  Neuro: No focal deficits noted  Psych: Normal mood  Skin: No rashes noted  MSK: Full range of motion intact      Results:   Lab Results   Component Value Date    WBC 29.8 (H) 2024    HGB 13.3 2024    HCT 37.9 (L) 2024    .0 2024    CREATSERUM 0.76 2024    BUN 18 2024     2024    K 4.2 2024     2024    CO2 28.0 2024     (H) 2024    CA 8.6 (L) 2024    ALB 3.9 2024    ALKPHO 84 2024    BILT 0.6 2024    TP 7.0 2024    AST 20 2024    ALT 19 2024    INR 1.03 2023    TSH 1.963 2024    PSA 0.8 2019    DDIMER 0.42 2024    TROPHS 4 2024       No results found.        Assessment & Plan:     COPD exacerbation 2/2 entero and rhino virus/Acute hypoxic respiratory failure  Ho 2ppd x 45 years- quit 4 months ago  Does not have pulmonologist established   CXR neg for pna, PCT neg  -Steroids, nebs, antibiotics  Pulmonary consult to establish care appr input  Wean off O2 as able-  home when weaned off O2        Leukocytosis, no obvious source of infection  -Monitor  -Doubt sepsis with no source of infection.  -LA down trending  -On antibiotics for COPD     Sinus tachycardia, likely due to dyspnea  -Monitor on telemetry     HTN  HLD  PVD  Obesity  -Resume home meds when reconciled     Quality:  DVT Prophylaxis: Heparin  CODE status: Full code          Beth Soriano MD  12/7/2024    I had a face to face visit with this patient and I evaluated the patient's O2 saturations. The patient is mobile in their home and is in need of a portable oxygen tank. The home oxygen will improve the patient's condition.

## 2024-12-07 NOTE — PLAN OF CARE
Problem: Patient Centered Care  Goal: Patient preferences are identified and integrated in the patient's plan of care  Description: Interventions:  - What would you like us to know as we care for you?   - Provide timely, complete, and accurate information to patient/family  - Incorporate patient and family knowledge, values, beliefs, and cultural backgrounds into the planning and delivery of care  - Encourage patient/family to participate in care and decision-making at the level they choose  - Honor patient and family perspectives and choices  Outcome: Progressing     Problem: Patient/Family Goals  Goal: Patient/Family Long Term Goal  Description: Patient's Long Term Goal: To be discharged home.     Interventions:  - Consult with Pulmonary  _ IV antibiotics  _ supplemental O2  _ IV steroids   - See additional Care Plan goals for specific interventions  Outcome: Progressing  Goal: Patient/Family Short Term Goal  Description: Patient's Short Term Goal: to breath better    Interventions:   - - Consult with Pulmonary  _ IV antibiotics  _ supplemental O2  _ IV steroids     - See additional Care Plan goals for specific interventions  Outcome: Progressing     Problem: CARDIOVASCULAR - ADULT  Goal: Absence of cardiac arrhythmias or at baseline  Description: INTERVENTIONS:  - Continuous cardiac monitoring, monitor vital signs, obtain 12 lead EKG if indicated  - Evaluate effectiveness of antiarrhythmic and heart rate control medications as ordered  - Initiate emergency measures for life threatening arrhythmias  - Monitor electrolytes and administer replacement therapy as ordered  Outcome: Progressing     Problem: RESPIRATORY - ADULT  Goal: Achieves optimal ventilation and oxygenation  Description: INTERVENTIONS:  - Assess for changes in respiratory status  - Assess for changes in mentation and behavior  - Position to facilitate oxygenation and minimize respiratory effort  - Oxygen supplementation based on oxygen saturation  or ABGs  - Provide Smoking Cessation handout, if applicable  - Encourage broncho-pulmonary hygiene including cough, deep breathe, Incentive Spirometry  - Assess the need for suctioning and perform as needed  - Assess and instruct to report SOB or any respiratory difficulty  - Respiratory Therapy support as indicated  - Manage/alleviate anxiety  - Monitor for signs/symptoms of CO2 retention  Outcome: Progressing     Problem: METABOLIC/FLUID AND ELECTROLYTES - ADULT  Goal: Electrolytes maintained within normal limits  Description: INTERVENTIONS:  - Monitor labs and rhythm and assess patient for signs and symptoms of electrolyte imbalances  - Administer electrolyte replacement as ordered  - Monitor response to electrolyte replacements, including rhythm and repeat lab results as appropriate  - Fluid restriction as ordered  - Instruct patient on fluid and nutrition restrictions as appropriate  Outcome: Progressing     Problem: RISK FOR INFECTION - ADULT  Goal: Absence of fever/infection during anticipated neutropenic period  Description: INTERVENTIONS  - Monitor WBC  - Administer growth factors as ordered  - Implement neutropenic guidelines  Outcome: Progressing     Problem: SAFETY ADULT - FALL  Goal: Free from fall injury  Description: INTERVENTIONS:  - Assess pt frequently for physical needs  - Identify cognitive and physical deficits and behaviors that affect risk of falls.  - [place] fall precautions as indicated by assessment.  - Educate pt/family on patient safety including physical limitations  - Instruct pt to call for assistance with activity based on assessment  - Modify environment to reduce risk of injury  - Provide assistive devices as appropriate  - Consider OT/PT consult to assist with strengthening/mobility  - Encourage toileting schedule  Outcome: Progressing     Problem: DISCHARGE PLANNING  Goal: Discharge to home or other facility with appropriate resources  Description: INTERVENTIONS:  - Identify  barriers to discharge w/pt and caregiver  - Include patient/family/discharge partner in discharge planning  - Arrange for needed discharge resources and transportation as appropriate  - Identify discharge learning needs (meds, wound care, etc)  - Arrange for interpreters to assist at discharge as needed  - Consider post-discharge preferences of patient/family/discharge partner  - Complete POLST form as appropriate  - Assess patient's ability to be responsible for managing their own health  - Refer to Case Management Department for coordinating discharge planning if the patient needs post-hospital services based on physician/LIP order or complex needs related to functional status, cognitive ability or social support system  Outcome: Progressing

## 2024-12-07 NOTE — PLAN OF CARE
Problem: Patient Centered Care  Goal: Patient preferences are identified and integrated in the patient's plan of care  Description: Interventions:  - What would you like us to know as we care for you?   - Provide timely, complete, and accurate information to patient/family  - Incorporate patient and family knowledge, values, beliefs, and cultural backgrounds into the planning and delivery of care  - Encourage patient/family to participate in care and decision-making at the level they choose  - Honor patient and family perspectives and choices  Outcome: Progressing     Problem: Patient/Family Goals  Goal: Patient/Family Long Term Goal  Description: Patient's Long Term Goal:     Interventions:  -   - See additional Care Plan goals for specific interventions  Outcome: Progressing  Goal: Patient/Family Short Term Goal  Description: Patient's Short Term Goal:     Interventions:   -   - See additional Care Plan goals for specific interventions  Outcome: Progressing     Problem: CARDIOVASCULAR - ADULT  Goal: Absence of cardiac arrhythmias or at baseline  Description: INTERVENTIONS:  - Continuous cardiac monitoring, monitor vital signs, obtain 12 lead EKG if indicated  - Evaluate effectiveness of antiarrhythmic and heart rate control medications as ordered  - Initiate emergency measures for life threatening arrhythmias  - Monitor electrolytes and administer replacement therapy as ordered  Outcome: Progressing     Problem: RESPIRATORY - ADULT  Goal: Achieves optimal ventilation and oxygenation  Description: INTERVENTIONS:  - Assess for changes in respiratory status  - Assess for changes in mentation and behavior  - Position to facilitate oxygenation and minimize respiratory effort  - Oxygen supplementation based on oxygen saturation or ABGs  - Provide Smoking Cessation handout, if applicable  - Encourage broncho-pulmonary hygiene including cough, deep breathe, Incentive Spirometry  - Assess the need for suctioning and  perform as needed  - Assess and instruct to report SOB or any respiratory difficulty  - Respiratory Therapy support as indicated  - Manage/alleviate anxiety  - Monitor for signs/symptoms of CO2 retention  Outcome: Progressing     Problem: PAIN - ADULT  Goal: Verbalizes/displays adequate comfort level or patient's stated pain goal  Description: INTERVENTIONS:  - Encourage pt to monitor pain and request assistance  - Assess pain using appropriate pain scale  - Administer analgesics based on type and severity of pain and evaluate response  - Implement non-pharmacological measures as appropriate and evaluate response  - Consider cultural and social influences on pain and pain management  - Manage/alleviate anxiety  - Utilize distraction and/or relaxation techniques  - Monitor for opioid side effects  - Notify MD/LIP if interventions unsuccessful or patient reports new pain  - Anticipate increased pain with activity and pre-medicate as appropriate  Outcome: Progressing     Problem: METABOLIC/FLUID AND ELECTROLYTES - ADULT  Goal: Electrolytes maintained within normal limits  Description: INTERVENTIONS:  - Monitor labs and rhythm and assess patient for signs and symptoms of electrolyte imbalances  - Administer electrolyte replacement as ordered  - Monitor response to electrolyte replacements, including rhythm and repeat lab results as appropriate  - Fluid restriction as ordered  - Instruct patient on fluid and nutrition restrictions as appropriate  Outcome: Progressing     Problem: RISK FOR INFECTION - ADULT  Goal: Absence of fever/infection during anticipated neutropenic period  Description: INTERVENTIONS  - Monitor WBC  - Administer growth factors as ordered  - Implement neutropenic guidelines  Outcome: Progressing     Problem: SAFETY ADULT - FALL  Goal: Free from fall injury  Description: INTERVENTIONS:  - Assess pt frequently for physical needs  - Identify cognitive and physical deficits and behaviors that affect risk  of falls.  - [place] fall precautions as indicated by assessment.  - Educate pt/family on patient safety including physical limitations  - Instruct pt to call for assistance with activity based on assessment  - Modify environment to reduce risk of injury  - Provide assistive devices as appropriate  - Consider OT/PT consult to assist with strengthening/mobility  - Encourage toileting schedule  Outcome: Progressing

## 2024-12-08 VITALS
OXYGEN SATURATION: 93 % | SYSTOLIC BLOOD PRESSURE: 118 MMHG | DIASTOLIC BLOOD PRESSURE: 68 MMHG | WEIGHT: 202.5 LBS | BODY MASS INDEX: 31 KG/M2 | HEART RATE: 89 BPM | TEMPERATURE: 98 F | RESPIRATION RATE: 18 BRPM

## 2024-12-08 LAB
BASOPHILS # BLD: 0 X10(3) UL (ref 0–0.2)
BASOPHILS NFR BLD: 0 %
DEPRECATED RDW RBC AUTO: 45.2 FL (ref 35.1–46.3)
EOSINOPHIL # BLD: 0 X10(3) UL (ref 0–0.7)
EOSINOPHIL NFR BLD: 0 %
ERYTHROCYTE [DISTWIDTH] IN BLOOD BY AUTOMATED COUNT: 13.2 % (ref 11–15)
GLUCOSE BLDC GLUCOMTR-MCNC: 93 MG/DL (ref 70–99)
HCT VFR BLD AUTO: 42.2 %
HGB BLD-MCNC: 14.4 G/DL
LYMPHOCYTES NFR BLD: 1.85 X10(3) UL (ref 1–4)
LYMPHOCYTES NFR BLD: 10 %
MCH RBC QN AUTO: 32.1 PG (ref 26–34)
MCHC RBC AUTO-ENTMCNC: 34.1 G/DL (ref 31–37)
MCV RBC AUTO: 94.2 FL
MONOCYTES # BLD: 0.93 X10(3) UL (ref 0.1–1)
MONOCYTES NFR BLD: 5 %
MORPHOLOGY: NORMAL
NEUTROPHILS # BLD AUTO: 13.15 X10 (3) UL (ref 1.5–7.7)
NEUTROPHILS NFR BLD: 85 %
NEUTS HYPERSEG # BLD: 15.73 X10(3) UL (ref 1.5–7.7)
PLATELET # BLD AUTO: 312 10(3)UL (ref 150–450)
PLATELET MORPHOLOGY: NORMAL
RBC # BLD AUTO: 4.48 X10(6)UL
TOTAL CELLS COUNTED BLD: 100
WBC # BLD AUTO: 18.5 X10(3) UL (ref 4–11)

## 2024-12-08 PROCEDURE — 99239 HOSP IP/OBS DSCHRG MGMT >30: CPT | Performed by: HOSPITALIST

## 2024-12-08 PROCEDURE — 99232 SBSQ HOSP IP/OBS MODERATE 35: CPT | Performed by: INTERNAL MEDICINE

## 2024-12-08 RX ORDER — PREDNISONE 20 MG/1
TABLET ORAL
Qty: 10 TABLET | Refills: 0 | Status: SHIPPED | OUTPATIENT
Start: 2024-12-09 | End: 2024-12-17

## 2024-12-08 NOTE — CM/SW NOTE
Social work received an update that the patient will need home O2.    Social work sent a referral in [place] to [facility] with all necessary documentation.     [facility] gave social work permission to dispense 2 O2 tanks.    O2 tanks were delivered at patient's bedside.  [facility] has spoken to patient and advised him to call [phone] after discharge.    RN aware of the above.    SW/CM to remain available for support and/or discharge planning.     Porsche Gill MSW, LSW  Discharge Planner [id number]

## 2024-12-08 NOTE — DISCHARGE PLANNING
Discharge order placed per md. Discharge instructions given to patient verbally and in writing. patient educated on medications and important follow ups. Patient discharged home with [facility] for home oxygen. Patient was given two portable oxygen tanks to take home.  Patient discharged home via family and private vehicle. PIV removed.

## 2024-12-08 NOTE — PROGRESS NOTES
12/08/24 0800   Mobility   O2 walk? Yes   SPO2% on Room Air at Rest 94   SPO2% on Oxygen at Rest 96   At rest oxygen flow (liters per minute) 0.5   SPO2% Ambulation on Room Air 85   SPO2% Ambulation on Oxygen 94   Ambulation oxygen flow (liters per minute) 5     02 walk data

## 2024-12-08 NOTE — DISCHARGE INSTRUCTIONS
HOME OXYGEN:  [facility]  [address] [place] [address]  Phone: [phone]  Fax: [phone]

## 2024-12-08 NOTE — PROGRESS NOTES
[facility]  part of formerly [facility]     Progress Note    Enmanuel Lara Patient Status:  Observation    1950 MRN [id number]   Location [facility]5W Attending Maryam Wilhelm MD   Hosp Day # 0 PCP Betito Garcia MD       Subjective:   Patient seen and examined.  Dyspnea gradually improving.  Some occasional cough.  Tolerating some ambulation    Objective:   Blood pressure 114/63, pulse 89, temperature 97.6 °F (36.4 °C), temperature source Oral, resp. rate 16, weight 202 lb 8 oz (91.9 kg), SpO2 96%.  Intake/Output:   Last 3 shifts: I/O last 3 completed shifts:  In: 1626 [P.O.:1376; IV PIGGYBACK:250]  Out: 600 [Urine:600]   This shift: No intake/output data recorded.     Vent Settings:      Hemodynamic parameters (last 24 hours):      Scheduled Meds:   Current Facility-Administered Medications   Medication Dose Route Frequency    predniSONE (Deltasone) tab 40 mg  40 mg Oral Daily with breakfast    ipratropium-albuterol (Duoneb) 0.5-2.5 (3) MG/3ML inhalation solution 3 mL  3 mL Nebulization 2 times daily    pravastatin (Pravachol) tab 10 mg  10 mg Oral Nightly    lisinopril (Zestril) tab 10 mg  10 mg Oral Daily    heparin (Porcine) 5000 UNIT/ML injection 5,000 Units  5,000 Units Subcutaneous Q8H CORAL    acetaminophen (Tylenol Extra Strength) tab 500 mg  500 mg Oral Q4H PRN    acetaminophen (Tylenol) tab 650 mg  650 mg Oral Q4H PRN    Or    HYDROcodone-acetaminophen (Norco) 5-325 MG per tab 1 tablet  1 tablet Oral Q4H PRN    Or    HYDROcodone-acetaminophen (Norco) 5-325 MG per tab 2 tablet  2 tablet Oral Q4H PRN    melatonin tab 3 mg  3 mg Oral Nightly PRN    ipratropium-albuterol (Duoneb) 0.5-2.5 (3) MG/3ML inhalation solution 3 mL  3 mL Nebulization Q6H PRN    ondansetron (Zofran) 4 MG/2ML injection 4 mg  4 mg Intravenous Q6H PRN    metoclopramide (Reglan) 5 mg/mL injection 10 mg  10 mg Intravenous Q8H PRN    benzonatate (Tessalon) cap 200 mg  200 mg Oral TID PRN    influenza virus  trivalent high dose PF (Fluzone HD) 0.5 mL injection (ages >/= 65 years) 0.5 mL  0.5 mL Intramuscular Prior to discharge    traMADol (Ultram) tab 50 mg  50 mg Oral Q6H PRN    guaiFENesin-codeine (Robitussin AC) 100-10 MG/5ML oral solution 5 mL  5 mL Oral Q4H PRN    umeclidinium-vilanterol (Anoro Ellipta) 62.5-25 MCG/ACT inhaler 1 puff  1 puff Inhalation Daily       Continuous Infusions:     Physical Exam  Constitutional: no acute distress  Eyes: PERRL  ENT: nares pateint  Neck: supple, no JVD  Cardio: RRR, S1 S2  Respiratory: Expiratory Rales  GI: abdomen soft, non tender, active bowel sounds, no organomegaly  Extremities: no clubbing, cyanosis, edema  Neurologic: no gross motor deficits  Skin: warm, dry      Results:     Lab Results   Component Value Date    WBC 18.5 12/08/2024    HGB 14.4 12/08/2024    HCT 42.2 12/08/2024    .0 12/08/2024       No results found.          Assessment   1.  Likely COPD with acute exacerbation  2.  Rhinovirus respiratory infection  3.  Acute hypoxemic respiratory failure  4.  Prior nicotine dependence  5.  Leukocytosis  6.  Hypertension     Plan   -Patient presents with worsening dyspnea likely COPD exacerbation with trigger secondary to rhinovirus respiratory infection  -Chest x-ray on presentation with no significant infiltrate seen  -Wean steroid therapy  -Nebulizer treatments  -Will start LABA/LAMA  -Recommend outpatient pulmonary function testing and lung screening CT  -Encouraged tobacco cessation  -Increase activity as tolerated  -Patient to be set up with home oxygen.  Okay for discharge later today with tapering course of prednisone over next week.  Recommend Anoro upon discharge.  Follow-up in pulmonary clinic in 2 weeks time    Gwen Gonzalez DO  Pulmonary Critical Care Medicine  [facility]

## 2024-12-08 NOTE — PLAN OF CARE
Problem: Patient Centered Care  Goal: Patient preferences are identified and integrated in the patient's plan of care  Description: Interventions:  - What would you like us to know as we care for you?   - Provide timely, complete, and accurate information to patient/family  - Incorporate patient and family knowledge, values, beliefs, and cultural backgrounds into the planning and delivery of care  - Encourage patient/family to participate in care and decision-making at the level they choose  - Honor patient and family perspectives and choices  Outcome: Adequate for Discharge     Problem: Patient/Family Goals  Goal: Patient/Family Long Term Goal  Description: Patient's Long Term Goal:     Interventions:  -   - See additional Care Plan goals for specific interventions  Outcome: Adequate for Discharge  Goal: Patient/Family Short Term Goal  Description: Patient's Short Term Goal:     Interventions:   - See additional Care Plan goals for specific interventions  Outcome: Adequate for Discharge     Problem: CARDIOVASCULAR - ADULT  Goal: Absence of cardiac arrhythmias or at baseline  Description: INTERVENTIONS:  - Continuous cardiac monitoring, monitor vital signs, obtain 12 lead EKG if indicated  - Evaluate effectiveness of antiarrhythmic and heart rate control medications as ordered  - Initiate emergency measures for life threatening arrhythmias  - Monitor electrolytes and administer replacement therapy as ordered  Outcome: Adequate for Discharge     Problem: RESPIRATORY - ADULT  Goal: Achieves optimal ventilation and oxygenation  Description: INTERVENTIONS:  - Assess for changes in respiratory status  - Assess for changes in mentation and behavior  - Position to facilitate oxygenation and minimize respiratory effort  - Oxygen supplementation based on oxygen saturation or ABGs  - Provide Smoking Cessation handout, if applicable  - Encourage broncho-pulmonary hygiene including cough, deep breathe, Incentive Spirometry  -  Assess the need for suctioning and perform as needed  - Assess and instruct to report SOB or any respiratory difficulty  - Respiratory Therapy support as indicated  - Manage/alleviate anxiety  - Monitor for signs/symptoms of CO2 retention  Outcome: Adequate for Discharge     Problem: METABOLIC/FLUID AND ELECTROLYTES - ADULT  Goal: Electrolytes maintained within normal limits  Description: INTERVENTIONS:  - Monitor labs and rhythm and assess patient for signs and symptoms of electrolyte imbalances  - Administer electrolyte replacement as ordered  - Monitor response to electrolyte replacements, including rhythm and repeat lab results as appropriate  - Fluid restriction as ordered  - Instruct patient on fluid and nutrition restrictions as appropriate  Outcome: Adequate for Discharge     Problem: PAIN - ADULT  Goal: Verbalizes/displays adequate comfort level or patient's stated pain goal  Description: INTERVENTIONS:  - Encourage pt to monitor pain and request assistance  - Assess pain using appropriate pain scale  - Administer analgesics based on type and severity of pain and evaluate response  - Implement non-pharmacological measures as appropriate and evaluate response  - Consider cultural and social influences on pain and pain management  - Manage/alleviate anxiety  - Utilize distraction and/or relaxation techniques  - Monitor for opioid side effects  - Notify MD/LIP if interventions unsuccessful or patient reports new pain  - Anticipate increased pain with activity and pre-medicate as appropriate  Outcome: Adequate for Discharge     Problem: RISK FOR INFECTION - ADULT  Goal: Absence of fever/infection during anticipated neutropenic period  Description: INTERVENTIONS  - Monitor WBC  - Administer growth factors as ordered  - Implement neutropenic guidelines  Outcome: Adequate for Discharge

## 2024-12-08 NOTE — DISCHARGE SUMMARY
[facility]  part of [facility]     Discharge Summary    Enmanuel Lara Patient Status:  Observation    1950 MRN [id number]   Location [facility]5W Attending Beth Soriano MD   Hosp Day # 0 PCP Betito Garcia MD     Date of Admission: 2024    Date of Discharge: 2024    Admitting Diagnosis: COPD exacerbation (HCC) [J44.1]    Discharge Diagnosis:   Patient Active Problem List   Diagnosis    Posterior capsule opacification    Pseudophakia of both eyes    Chronic airway obstruction (HCC)    Impotence of organic origin    Mixed hyperlipidemia    Rosacea    Essential hypertension with goal blood pressure less than 140/90    Internal hemorrhoids    left 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    Class 1 obesity due to excess calories without serious comorbidity with body mass index (BMI) of 31.0 to 31.9 in adult    Panlobular emphysema (HCC)    Cigarette nicotine dependence without complication    Atherosclerosis of aorta (HCC)    Spinal stenosis of lumbar region with radiculopathy    Osteoarthrosis, shoulder region    Prediabetes    Degenerative spondylolisthesis    Chronic bilateral low back pain with left-sided sciatica    COPD exacerbation (HCC)       Reason for Admission:     COPD exacerbation    Physical Exam:     General: Patient is alert and oriented x3 does not appear to be in acute distress at this time  HEENT: EOMI PERRLA, atraumatic normocephalic  Cardiac: S1-S2 appreciated  Lungs: Good air entry bilaterally clear to auscultation  Abdomen: Soft nontender nondistended positive bowel sounds  Ext: Peripheral pulses are positive  Neuro: No focal deficits noted  Psych: Normal mood  Skin: No rashes noted  MSK: Full range of motion intact      Hospital Course:     COPD exacerbation 2/2 entero and rhino virus/Acute hypoxic respiratory failure  Ho 2ppd x 45 years- quit 4 months ago  Does not have pulmonologist established   CXR neg for pna, PCT neg  -Steroids,  nebs, antibiotics  Pulmonary consult to establish care appr input  Wean off O2 as able- home when weaned off O2        Leukocytosis, no obvious source of infection  -Monitor  -Doubt sepsis with no source of infection.  -LA down trending  -On antibiotics for COPD     Sinus tachycardia, likely due to dyspnea  -Monitor on telemetry     HTN  HLD  PVD  Obesity  -Resume home meds when reconciled     Quality:  DVT Prophylaxis: Heparin  CODE status: Full code      Patient will require home oxygen.   Cleared from pulmonary perspective to be dc    History of Present Illness:     Per admitting:    karsten Lara is a 73 year old male with history of COPD, HTN, HLD, obesity, PVD who presented to the ED with complaints of shortness of breath.  Onset about 2 to 3 days ago, worse with exertion.  Described as a feeling of suffocation.  Also has cough productive of greenish phlegm but no fever or chills.  He has been around sick contacts.  Tried OTC NyQuil and cough medicine without improvement, noted worsening.  No breathing treatments.  No chest pain, palpitations, headaches or dizziness.  No diarrhea, urinary symptoms.    Disposition: Home or Self Care    Discharge Condition: Stable    Discharge Medications:   Current Discharge Medication List        START taking these medications    Details   umeclidinium-vilanterol 62.5-25 MCG/ACT Inhalation Aerosol Powder, Breath Activated Inhale 1 puff into the lungs daily.  Qty: 1 each, Refills: 0      predniSONE 20 MG Oral Tab Take 2 tablets (40 mg total) by mouth daily with breakfast for 2 days, THEN 1.5 tablets (30 mg total) daily with breakfast for 2 days, THEN 1 tablet (20 mg total) daily with breakfast for 2 days, THEN 0.5 tablets (10 mg total) daily with breakfast for 2 days.  Qty: 10 tablet, Refills: 0           CONTINUE these medications which have NOT CHANGED    Details   traMADol 50 MG Oral Tab Take 1-2 tablets ( mg total) by mouth.      lisinopril 10 MG Oral Tab Take 1 tablet  (10 mg total) by mouth daily.  Qty: 90 tablet, Refills: 3      simvastatin 20 MG Oral Tab Take 1 tablet (20 mg total) by mouth every evening.  Qty: 90 tablet, Refills: 3      Ascorbic Acid (VITAMIN C) 1000 MG Oral Tab Take 1 tablet (1,000 mg total) by mouth daily.      celecoxib 200 MG Oral Cap Take 1 capsule (200 mg total) by mouth 2 (two) times daily with meals.  Qty: 180 capsule, Refills: 1           STOP taking these medications       acetaminophen 500 MG Oral Tab              Total dc time > 30 min    Beth Soriano MD  12/8/2024  1:40 PM     Hospital Discharge Diagnoses:  COPD exacerbation    Lace+ Score: 69  59-90 High Risk  29-58 Medium Risk  0-28   Low Risk.    TCM Follow-Up Recommendation:  LACE > 58: High Risk of readmission after discharge from the hospital.

## 2024-12-08 NOTE — CM/SW NOTE
For RB to copy into progress note:  Documentation for O2 sats: (RN to add to progress note)  Patient's O2 sat on room air is ____% at rest. Pt's O2 sat on room is ____% when ambulating, and ____% on ____ liter while ambulating.     (Reminder: The \"at rest\" and \"while ambulating\" are required statements. If patient is non ambulatory you can use \"with exertion\" such as during a transfer to assess their O2 level)      For MD to copy into NEW progress note AFTER walk test:  I had a face to face visit with this patient and I evaluated the patient's O2 saturations.  The patient is mobile in their home and is in need of a portable oxygen tank.  The home oxygen will improve the patient's condition.          SW/CM to remain available for support and/or discharge planning.     Porsche Gill MSW, LSW  Discharge Planner [id number]

## 2024-12-08 NOTE — PLAN OF CARE
Problem: CARDIOVASCULAR - ADULT  Goal: Absence of cardiac arrhythmias or at baseline  Description: INTERVENTIONS:  - Continuous cardiac monitoring, monitor vital signs, obtain 12 lead EKG if indicated  - Evaluate effectiveness of antiarrhythmic and heart rate control medications as ordered  - Initiate emergency measures for life threatening arrhythmias  - Monitor electrolytes and administer replacement therapy as ordered  Outcome: Progressing       Problem: RESPIRATORY - ADULT  Goal: Achieves optimal ventilation and oxygenation  Description: INTERVENTIONS:  - Assess for changes in respiratory status  - Assess for changes in mentation and behavior  - Position to facilitate oxygenation and minimize respiratory effort  - Oxygen supplementation based on oxygen saturation or ABGs  - Provide Smoking Cessation handout, if applicable  - Encourage broncho-pulmonary hygiene including cough, deep breathe, Incentive Spirometry  - Assess the need for suctioning and perform as needed  - Assess and instruct to report SOB or any respiratory difficulty  - Respiratory Therapy support as indicated  - Manage/alleviate anxiety  - Monitor for signs/symptoms of CO2 retention  Outcome: Progressing     Problem: METABOLIC/FLUID AND ELECTROLYTES - ADULT  Goal: Electrolytes maintained within normal limits  Description: INTERVENTIONS:  - Monitor labs and rhythm and assess patient for signs and symptoms of electrolyte imbalances  - Administer electrolyte replacement as ordered  - Monitor response to electrolyte replacements, including rhythm and repeat lab results as appropriate  - Fluid restriction as ordered  - Instruct patient on fluid and nutrition restrictions as appropriate  Outcome: Progressing     Problem: PAIN - ADULT  Goal: Verbalizes/displays adequate comfort level or patient's stated pain goal  Description: INTERVENTIONS:  - Encourage pt to monitor pain and request assistance  - Assess pain using appropriate pain scale  -  Administer analgesics based on type and severity of pain and evaluate response  - Implement non-pharmacological measures as appropriate and evaluate response  - Consider cultural and social influences on pain and pain management  - Manage/alleviate anxiety  - Utilize distraction and/or relaxation techniques  - Monitor for opioid side effects  - Notify MD/LIP if interventions unsuccessful or patient reports new pain  - Anticipate increased pain with activity and pre-medicate as appropriate  Outcome: Progressing     Problem: RISK FOR INFECTION - ADULT  Goal: Absence of fever/infection during anticipated neutropenic period  Description: INTERVENTIONS  - Monitor WBC  - Administer growth factors as ordered  - Implement neutropenic guidelines  Outcome: Progressing     Problem: SAFETY ADULT - FALL  Goal: Free from fall injury  Description: INTERVENTIONS:  - Assess pt frequently for physical needs  - Identify cognitive and physical deficits and behaviors that affect risk of falls.  - [place] fall precautions as indicated by assessment.  - Educate pt/family on patient safety including physical limitations  - Instruct pt to call for assistance with activity based on assessment  - Modify environment to reduce risk of injury  - Provide assistive devices as appropriate  - Consider OT/PT consult to assist with strengthening/mobility  - Encourage toileting schedule  Outcome: Progressing

## 2024-12-09 ENCOUNTER — PATIENT OUTREACH (OUTPATIENT)
Dept: CASE MANAGEMENT | Age: 74
End: 2024-12-09

## 2024-12-09 ENCOUNTER — TELEPHONE (OUTPATIENT)
Dept: PULMONOLOGY | Facility: CLINIC | Age: 74
End: 2024-12-09

## 2024-12-09 DIAGNOSIS — Z02.9 ENCOUNTERS FOR UNSPECIFIED ADMINISTRATIVE PURPOSE: ICD-10-CM

## 2024-12-09 DIAGNOSIS — J44.1 COPD EXACERBATION (HCC): Primary | ICD-10-CM

## 2024-12-09 PROCEDURE — 1111F DSCHRG MED/CURRENT MED MERGE: CPT

## 2024-12-09 PROCEDURE — 1159F MED LIST DOCD IN RCRD: CPT

## 2024-12-09 NOTE — TELEPHONE ENCOUNTER
Patient calling to schedule hospital follow up in two weeks, please call. Declined to schedule next opening states too far out.    41M s/p right lap converted to open simple nephrectomy, initially requiring pressors post op, kept intubated and transferred to SICU. Extubated 3/12, off pressors and now on the floor  has achieved gi function  complicated by incisional cellulitis and requiring packing. no evidence of dehiscence  packing changed   likely removing franny in am   abx changed to clinda and ceftriaxone

## 2024-12-09 NOTE — PROGRESS NOTES
Transitional Care Management   Discharge Date: 24  Contact Date: 2024    Assessment:  TCM Initial Assessment    General:  Assessment completed with: Patient  Patient Subjective: The patient reports doing ell with the supplemental oxygen at home. The patient reported oxygen saturation readings between 93-94%. During the Transitional Care Management call, the patient's oxygen saturation was 90%. The patient reported an increase in energy and strength. The patient denies any difficulty breathing/ shortness of breath, confusion, irregular heart beats/palpitations, dizziness/syncope, chest pain, fever or edema. The patient has continued to have wheezing and a productive cough after the deep breathing exercises. The patient denies any hemoptysis. The patient does not check blood pressure at home but denies any  headache, weakness, drowsiness, pulsating in the ears,  tingling/numbness, or vision changes.  Chief Complaint: COPD exacerbation 2/2 entero and rhino virus/Acute hypoxic respiratory failure  Ho 2ppd x 45 years- quit 4 months ago  Leukocytosis, no obvious source of infection  Sinus tachycardia, likely due to dyspnea  HTN  HLD  PVD  Obesity  Verify patient name and  with patient/ caregiver: Yes    Hospital Stay/Discharge:  Tell me what you understand of why you were in the hospital or emergency department: I was having shortness of breath with walking over night that kept getting worse.  Prior to leaving the hospital were your Discharge Instructions reviewed with you?: Yes  Did you receive a copy of your written Discharge Instructions?: Yes  What questions do you have about your Discharge Instructions?: No  Do you feel better or worse since you left the hospital or emergency department?: Better    Follow - Up Appointment:  Do you have a follow-up appointment?: Yes  Date: 12/10/24  Physician: Dr. Betito Garcia  Are there any barriers to getting to your follow-up appointment?: No    Home  Health/DME:  Prior to leaving the hospital was Home Health (HH) arranged for you?: N/A  Are HH needs identified by staff during the assessment?: No     Prior to leaving the hospital or emergency department was Durable Medical Equipment (DME), medical supplies, or infusions arranged for you?: Yes (supplemental oxygen and incentive spirometer)  Have you received your DME/supplies/infusions?: Yes  Do you have questions about using your DME/supplies/infusions?: No     Medications/Diet:  Did any of your medications change, during or after your hospital stay or ED visit?: Yes  Do you have your new or updated medications?: Yes  Do you understand what your medications are for and possible side effects?: Yes  Are there any reasons that keep you from taking your medication as prescribed?: No  Any concerns about medication refills?: No    Were you given a different diet per your Discharge Instructions?: No     Questions/Concerns:  Do you have any questions or concerns that have not been discussed?: No       Nursing Interventions:   Patient symptoms were assessed, education was completed for signs/symptoms to monitor, and reviewed when to contact providers versus go to the emergency department/call 911.   Reviewed all discharge instructions with a focus on supplemental oxygen use and deep breathing exercises.   NCM did review/stress the importance of fall precautions.   All medications were reviewed and educated on the importance of taking the medications as directed.   Appointments were reviewed and stressed the importance of a close follow up with providers.  Confirmed patient has DME (medical equipment) and understands proper use.  (Supplemental oxygen and incentive spirometer)    The patient verbalized understanding and will contact the office with any further questions or concerns.       Medication Review:   NCM did confirm the patient has discontinued the following as directed:  acetaminophen 500 MG Tabs (Tylenol Extra  Strength)  Current Outpatient Medications   Medication Sig Dispense Refill    umeclidinium-vilanterol 62.5-25 MCG/ACT Inhalation Aerosol Powder, Breath Activated Inhale 1 puff into the lungs daily. 1 each 0    predniSONE 20 MG Oral Tab Take 2 tablets (40 mg total) by mouth daily with breakfast for 2 days, THEN 1.5 tablets (30 mg total) daily with breakfast for 2 days, THEN 1 tablet (20 mg total) daily with breakfast for 2 days, THEN 0.5 tablets (10 mg total) daily with breakfast for 2 days. 10 tablet 0    celecoxib 200 MG Oral Cap Take 1 capsule (200 mg total) by mouth 2 (two) times daily with meals. 180 capsule 1    traMADol 50 MG Oral Tab Take 1-2 tablets ( mg total) by mouth.      lisinopril 10 MG Oral Tab Take 1 tablet (10 mg total) by mouth daily. 90 tablet 3    simvastatin 20 MG Oral Tab Take 1 tablet (20 mg total) by mouth every evening. 90 tablet 3    Ascorbic Acid (VITAMIN C) 1000 MG Oral Tab Take 1 tablet (1,000 mg total) by mouth daily.       Did patient review medications using current pill bottles and not just a medication list?  Yes  Discharge medications reviewed/discussed/and reconciled against outpatient medications with patient.  Any changes or updates to medications sent to primary care provider.  Patient Acknowledged    SDOH:   Transportation:   Transportation Needs: No Transportation Needs (12/9/2024)    Transportation Needs     Lack of Transportation: No     Car Seat: Not on file     Financial strain:   Financial Resource Strain: Low Risk  (12/9/2024)    Financial Resource Strain     Difficulty of Paying Living Expenses: Not hard at all     Med Affordability: No         Diagnosis specifics:  COPD:  Have you participated in a pulmonary rehab program?   no   Would you like more information?  no  We reviewed your medications/inhalers, how often are you using your inhaler? The patient has started the Anoro inhaler daily.   Are you currently on oxygen?yes   How many Liters? 1L  Do you have any  questions about Oxygen use?  no; Reviewed oxygen safety.   Are you familiar with the signs and symptoms of worsening COPD?Yes       Who do you call with worsening COPD symptoms?Dr. Gonzalez   Do you know when to call with COPD symptoms? Yes   Do you have any of the following potential risk factors for COPD in your home environment?  Primary or secondary tobacco smoke   no  Occupational dusts and chemicals organic and inorganic    no  Indoor air pollution from heating and cooking with poor ventilation   no    Mold      yes; The patient reported mold in a bathroom upstairs that is not in use.     Pets        no    Extreme heat no         Follow-up Appointments:  Your appointments       Date & Time Appointment Department (Center)    Dec 10, 2024 12:00 PM [facility] Follow Up with Betito Garcia MD [facility] ([facility])        Dec 17, 2024 1:00 PM [facility] with Damari Rivera NP [facility] ([facility])        Apr 14, 2025 5:00 PM CDT [place] Supervisit with Betito Garcia MD [facility] ([facility]  [address]  [facility] [id number] [phone] [facility]  [address]  [facility] [id number] [phone] [facility]  [address] [address] [phone]            Transitional Care Clinic  Was TCC Ordered: No      Primary Care Provider (If no TCC appointment)  Does patient already have a PCP appointment scheduled? Yes  Nurse Care Manager Confirmed PCP office TCM appointment with patient on 12/10/2024 with Dr. Garcia.        Specialist  Does the patient have any other follow-up  appointment(s) that need to be scheduled? Yes   -If yes: Nurse Care Manager reviewed upcoming specialist appointments with patient: Yes   -Does the patient need assistance scheduling appointment(s): No    The patient is scheduled with the Specialty Care Clinic on 12/17/2024.    The patient has contacted Dr. Gonzalez's office to request an appointment.       CCM referral placed:  No; The patient is already enrolled.     Book By Date: 12/22/2024

## 2024-12-10 ENCOUNTER — OFFICE VISIT (OUTPATIENT)
Facility: CLINIC | Age: 74
End: 2024-12-10

## 2024-12-10 ENCOUNTER — PATIENT OUTREACH (OUTPATIENT)
Dept: CASE MANAGEMENT | Age: 74
End: 2024-12-10

## 2024-12-10 VITALS
BODY MASS INDEX: 30.62 KG/M2 | SYSTOLIC BLOOD PRESSURE: 130 MMHG | DIASTOLIC BLOOD PRESSURE: 82 MMHG | HEIGHT: 68 IN | HEART RATE: 99 BPM | WEIGHT: 202 LBS | OXYGEN SATURATION: 90 %

## 2024-12-10 DIAGNOSIS — D72.829 LEUKOCYTOSIS, UNSPECIFIED TYPE: ICD-10-CM

## 2024-12-10 DIAGNOSIS — J44.1 COPD EXACERBATION (HCC): Primary | ICD-10-CM

## 2024-12-10 DIAGNOSIS — B34.8 RHINOVIRUS INFECTION: ICD-10-CM

## 2024-12-10 DIAGNOSIS — J96.01 ACUTE RESPIRATORY FAILURE WITH HYPOXIA (HCC): ICD-10-CM

## 2024-12-10 PROCEDURE — 1159F MED LIST DOCD IN RCRD: CPT | Performed by: INTERNAL MEDICINE

## 2024-12-10 PROCEDURE — 3008F BODY MASS INDEX DOCD: CPT | Performed by: INTERNAL MEDICINE

## 2024-12-10 PROCEDURE — 99496 TRANSJ CARE MGMT HIGH F2F 7D: CPT | Performed by: INTERNAL MEDICINE

## 2024-12-10 PROCEDURE — 1126F AMNT PAIN NOTED NONE PRSNT: CPT | Performed by: INTERNAL MEDICINE

## 2024-12-10 PROCEDURE — 3075F SYST BP GE 130 - 139MM HG: CPT | Performed by: INTERNAL MEDICINE

## 2024-12-10 PROCEDURE — 3079F DIAST BP 80-89 MM HG: CPT | Performed by: INTERNAL MEDICINE

## 2024-12-10 PROCEDURE — 1111F DSCHRG MED/CURRENT MED MERGE: CPT | Performed by: INTERNAL MEDICINE

## 2024-12-10 NOTE — PROGRESS NOTES
Spoke to Enmanuel for CCM.      Updates to patient care team/comments: None   Patient reported changes in medications: None     Med Adherence  Comment: Pt confirmed he is taking medications as instructed by their providers.     Health Maintenance:   Health Maintenance   Topic Date Due    Influenza Vaccine (1) 10/01/2024    Zoster Vaccines (2 of 3) 12/14/2024 (Originally 11/30/2015)    Lung Cancer Screening  05/14/2025 (Originally 12/14/2003)    COVID-19 Vaccine (5 - 2024-25 season) 10/27/2025 (Originally 9/1/2024)    PSA  06/03/2026    Colorectal Cancer Screening  08/13/2027    MA Annual Health Assessment  Completed    Annual Depression Screening  Completed    Fall Risk Screening (Annual)  Completed    Pneumococcal Vaccine: 65+ Years  Completed     Patient updates/concerns:     The patient reports being hospitalized for four days this past month due to COPD exacerbation and RSV diagnosis. He was discharged home on continuous oxygen therapy, using 1 liter, with his oxygen levels remaining between 90-95%    He also noted experiencing back pain and is currently managing about 1,500 steps per day.     Goals/Action Plan:    Active goal from previous outreach:   Continue with  PT exercises and lose 5 lbs get down to 198 lbs          Where/when/how: By eating healthier, cutting back on portions and snacks.     Patient reported progress towards goals:                - What:  He reports that is his current weight is 202 lbs           - Where/When/How: He mentions that he has cut back on portions and he lacks appetite since his RSV diagnosis.  Patient Reported Barriers and Concerns: He is not able to ambulate for long periods of time.                   - Plan for overcoming barriers: He will follow up with pulmonary and will continue with oxygen as directed. Working towards eating health meals.     Care Managers Interventions:  Nutrition and exercise counseling: I encouraged the patient to consume three balanced meals daily and  discussed the importance of incorporating 20-30 minutes of daily exercise, including gentle stretches. To improve overall health.    Chat review and documentation: I thoroughly reviewed the patient chart and updated Care Everywhere.    Immunization Status: I conducted an [employer] immunization query, which confirmed that no updates were necessary at this time.     Social determinants of health: I updated social determinants of health to reflect any recent changes.    Patient support: I actively listened to the patient's concern's, provided emotional support and encouraged the patient to reach out, if he needs further assistance.     Reviewed Future Appointments:   Future Appointments   Date Time Provider Department Center   12/10/2024 12:00 PM Betito Garcia MD [facility]   12/17/2024  1:00 PM Damari Rivera, JOHN [facility]   4/14/2025  5:00 PM Betito Garcia MD [facility]       Next Care Manager Follow Up Date: One month. Encouraged patient to call as needed.    Reason For Follow Up: review progress and or barriers towards patient's goals.     Time Spent This Encounter Total: 29 min medical record review, telephone communication, care plan updates where needed, education, goals, and action plan recreation/update. Provided acknowledgment and validation to patient's concerns.   Monthly Minute Total including today: 29  Physical assessment, complete health history, and need for CCM established by Betito Garcai MD.  Friendly reminder - 2025 Benefits Open Enrollment is here!   We encourage you to review your current Medicare coverage and explore your options during this period. We have developed a Medicare Information Page on our website to serve as a resource for guidance and answers to any questions you might have.   You can access it by visiting, [email or web address]

## 2024-12-10 NOTE — PROGRESS NOTES
Subjective:   Enmanuel Lara is a 73 year old male who presents for hospital follow up.   He was discharged from [facility] to Home Health Care Services  Admission Date: 12/4/24   Discharge Date: 12/8/24  Hospital Discharge Diagnosis: COPD exacerbation, acute hypoxic respiratory failure, rhinovirus infection    Interactive contact within 2 business days post discharge first initiated on Date: 12/9/2024    During the visit, the following was completed:  Obtained and reviewed discharge summary, continuity of care documents, and Hospitalist notes  Reviewed Labs (CBC, CMP)    HPI:     Discharge summary copied    History of Present Illness:      Per admitting:     MARIELA Lara is a 73 year old male with history of COPD, HTN, HLD, obesity, PVD who presented to the ED with complaints of shortness of breath.  Onset about 2 to 3 days ago, worse with exertion.  Described as a feeling of suffocation.  Also has cough productive of greenish phlegm but no fever or chills.  He has been around sick contacts.  Tried OTC NyQuil and cough medicine without improvement, noted worsening.  No breathing treatments.  No chest pain, palpitations, headaches or dizziness.  No diarrhea, urinary symptoms.         Hospital Course:      COPD exacerbation 2/2 entero and rhino virus/Acute hypoxic respiratory failure  Ho 2ppd x 45 years- quit 4 months ago  Does not have pulmonologist established   CXR neg for pna, PCT neg  -Steroids, nebs, antibiotics  Pulmonary consult to establish care appr input  Wean off O2 as able- home when weaned off O2        Leukocytosis, no obvious source of infection  -Monitor  -Doubt sepsis with no source of infection.  -LA down trending  -On antibiotics for COPD     Sinus tachycardia, likely due to dyspnea  -Monitor on telemetry     HTN  HLD  PVD  Obesity  -Resume home meds when reconciled     Quality:  DVT Prophylaxis: Heparin  CODE status: Full code        Patient will require home oxygen.   Cleared from pulmonary perspective  to be dc      Today he is here for follow-up.  He reports that he is feeling better since discharge.  He is taking prednisone regularly.  Denies any chest pain.  Breathing is improving.  He is using oxygen regularly.    History/Other:   Current Medications:  Medication Reconciliation:  I am aware of an inpatient discharge within the last 30 days.  The discharge medication list has been reconciled with the patient's current medication list and reviewed by me. See medication list for additions of new medication, and changes to current doses of medications and discontinued medications.  Outpatient Medications Marked as Taking for the 12/10/24 encounter (Office Visit) with Betito Garcia MD   Medication Sig    umeclidinium-vilanterol 62.5-25 MCG/ACT Inhalation Aerosol Powder, Breath Activated Inhale 1 puff into the lungs daily.    predniSONE 20 MG Oral Tab Take 2 tablets (40 mg total) by mouth daily with breakfast for 2 days, THEN 1.5 tablets (30 mg total) daily with breakfast for 2 days, THEN 1 tablet (20 mg total) daily with breakfast for 2 days, THEN 0.5 tablets (10 mg total) daily with breakfast for 2 days.    celecoxib 200 MG Oral Cap Take 1 capsule (200 mg total) by mouth 2 (two) times daily with meals.    lisinopril 10 MG Oral Tab Take 1 tablet (10 mg total) by mouth daily.    simvastatin 20 MG Oral Tab Take 1 tablet (20 mg total) by mouth every evening.    Ascorbic Acid (VITAMIN C) 1000 MG Oral Tab Take 1 tablet (1,000 mg total) by mouth daily.       Review of Systems   Constitutional:  Negative for activity change, appetite change and fever.   HENT:  Negative for congestion and voice change.    Respiratory:  Negative for cough and shortness of breath.    Cardiovascular:  Negative for chest pain.   Gastrointestinal:  Negative for abdominal distention, abdominal pain and vomiting.   Genitourinary:  Negative for hematuria.   Skin:  Negative for wound.   Psychiatric/Behavioral:  Negative for behavioral problems.           Objective:   Physical Exam  Constitutional:       Appearance: Normal appearance.   HENT:      Head: Normocephalic.   Eyes:      Conjunctiva/sclera: Conjunctivae normal.   Cardiovascular:      Rate and Rhythm: Normal rate and regular rhythm.      Heart sounds: Normal heart sounds. No murmur heard.  Pulmonary:      Effort: Pulmonary effort is normal.      Breath sounds: Normal breath sounds. No rhonchi or rales.   Abdominal:      General: Bowel sounds are normal.      Palpations: Abdomen is soft.      Tenderness: There is no abdominal tenderness.   Musculoskeletal:      Cervical back: Neck supple.      Right lower leg: No edema.      Left lower leg: No edema.   Skin:     General: Skin is warm and dry.   Neurological:      General: No focal deficit present.      Mental Status: He is alert and oriented to person, place, and time. Mental status is at baseline.   Psychiatric:         Mood and Affect: Mood normal.         Behavior: Behavior normal.         /82   Pulse 99   Ht 5' 8\" (1.727 m)   Wt 202 lb (91.6 kg)   SpO2 90%   BMI 30.71 kg/m²  Estimated body mass index is 30.71 kg/m² as calculated from the following:    Height as of this encounter: 5' 8\" (1.727 m).    Weight as of this encounter: 202 lb (91.6 kg).    Assessment & Plan:   1. COPD exacerbation (HCC) (Primary)  -     Pulmonary Referral - In Network  2. Acute respiratory failure with hypoxia (HCC)  -     Pulmonary Referral - In Network  3. Rhinovirus infection  -     Pulmonary Referral - In Network  4. Leukocytosis, unspecified type  -     CBC, Platelet; No Differential; Future; Expected date: 12/10/2024  Plan  Continue inhaler regimen  Complete prednisone  Continue to wear her oxygen and wean off as tolerated  To see pulmonary  We will repeat CBC in 2 weeks after completing prednisone.  I will see him back as scheduled.      No follow-ups on file.

## 2024-12-11 NOTE — TELEPHONE ENCOUNTER
Spoke with patient. Appointment scheduled for 12/24/24 at 10:15AM. Confirmed date, time, place, and where to park. Patient verbalized understanding.

## 2024-12-17 ENCOUNTER — OFFICE VISIT (OUTPATIENT)
Dept: CARDIOLOGY CLINIC | Facility: HOSPITAL | Age: 74
End: 2024-12-17
Attending: NURSE PRACTITIONER
Payer: MEDICARE

## 2024-12-17 VITALS — SYSTOLIC BLOOD PRESSURE: 100 MMHG | HEART RATE: 50 BPM | DIASTOLIC BLOOD PRESSURE: 44 MMHG | OXYGEN SATURATION: 93 %

## 2024-12-17 DIAGNOSIS — J44.1 COPD EXACERBATION (HCC): Primary | ICD-10-CM

## 2024-12-17 DIAGNOSIS — J96.21 ACUTE ON CHRONIC RESPIRATORY FAILURE WITH HYPOXEMIA (HCC): ICD-10-CM

## 2024-12-17 DIAGNOSIS — Z87.891 FORMER SMOKER: ICD-10-CM

## 2024-12-17 DIAGNOSIS — J44.9 CHRONIC OBSTRUCTIVE PULMONARY DISEASE, UNSPECIFIED COPD TYPE (HCC): ICD-10-CM

## 2024-12-17 PROBLEM — F17.210 CIGARETTE NICOTINE DEPENDENCE WITHOUT COMPLICATION: Status: RESOLVED | Noted: 2020-06-23 | Resolved: 2024-12-17

## 2024-12-17 PROCEDURE — 99215 OFFICE O/P EST HI 40 MIN: CPT | Performed by: NURSE PRACTITIONER

## 2024-12-17 RX ORDER — ALBUTEROL SULFATE 90 UG/1
2 INHALANT RESPIRATORY (INHALATION) EVERY 4 HOURS PRN
Qty: 8.5 G | Refills: 0 | Status: SHIPPED | OUTPATIENT
Start: 2024-12-17

## 2024-12-17 NOTE — PATIENT INSTRUCTIONS
Continue all your medications as prescribed     Can use albuterol inhaler 2 puffs every 4 hours as needed for wheezing or shortness of breath     Follow up with Dr. Gonzalez on 12/24/24     Follow up with the specialty care clinic as needed or as directed     Continue wearing oxygen at 1 liter per nasal cannula with walking more than 200 feet or exertion, and at night, keep oxygen saturation at 90% or greater , can have oxygen off at rest.     Use the incentive spirometer taking 10 slow inhaled breaths followed by cough, 4 times a day.    Consider having influenza, covid, pneumonia and RSV vaccines in the next few weeks.         Call if you are having shortness of breath, cough, wheezing, chest pain, dizziness, lightheadedness, heart racing, palpitations, swelling, rapid weight gain, fatigue, weakness, fever or chills.  Call 911 with severe shortness of breath, chest pain or chest pressure not improved after 15 minutes of rest or if feeling faint,  passing out or having a fall     Keep daily fluids at 64 ounces per day (2 liters of liquid or 8 , 8 oz glasses of liquid)    Heart healthy diet. Limit sodium to  2000 mg daily. Common high sodium foods include frozen dinners, soups (not homemade), some cereal, vegetable juice, canned vegetables, lunch meats, processed meats like hotdogs, sausage, abbasi, pepperoni, soy sauce, pre-packaged rice or potatoes. Please remember to read nutrition labels for sodium content.   www.healthyeating.nhlbi.nih.gov    Exercise daily as tolerated, with goal of doing moderate aerobic exercise like walking for about 30 minutes 5 days per week. Start by walking at a slow to moderate pace for 3-5 minutes 2-3 times a day. Pace your activity to prevent shortness of breath or fatigue. Stop exercise if you develop chest pain, lightheadedness, or significant shortness of breath    Always carry a copy of your current medication list with you

## 2024-12-17 NOTE — PROGRESS NOTES
[facility]    Enmanuel Lara Patient Status:  No patient class for patient encounter    1950 MRN [id number]   Location [facility] MD Dr. Gwen Go       Enmanuel Lara is a 74 year old male who presents to clinic for assessment and management after hospitalization for acute exacerbation of COPD.     Admitted -24 with entero and rhinovirus with ARF with hypoxemia. Improved with IV/oral abx, steroids, nebs, 02. Home on , Breo and prednisone taper.     Problem List:  Acute exacerbation of COPD  Enterovirus and rhinovirus  Acute on chronic respiratory failure with hypoxemia  Former smoker, 45 pk/yr hx, quit .  HTN  HLD   PVD     Subjective:  Feeling better since discharge   94-96% rest, 90% walking, 90-93% HR up to 135.   Sinus  pac's   Increasing steps up to  , was walking 4000 steps per day(up to  2 miles) , works at [employer].   Productive cough, yellow/green secretions    Review of Systems:  Constitutional: negative for chills or fever  Respiratory:  negative for cough, hemoptysis and wheezing  Cardiovascular: negative for chest pain, exertional chest pressure/discomfort, near-syncope, orthopnea and palpitations  Gastrointestinal: negative for abdominal pain, diarrhea, melena, nausea and vomiting  Hematologic/lymphatic: negative  Musculoskeletal: negative for muscle weakness and myalgias    Objective:  Lab Results   Component Value Date/Time    WBC 18.5 (H) 2024 07:32 AM    HGB 14.4 2024 07:32 AM    HCT 42.2 2024 07:32 AM    .0 2024 07:32 AM    CREATSERUM 0.76 2024 07:40 AM    BUN 18 2024 07:40 AM     2024 07:40 AM    K 4.2 2024 04:57 AM     2024 07:40 AM    CO2 28.0 2024 07:40 AM     (H) 2024 07:40 AM    CA 8.6 (L) 2024 07:40 AM    ALB 3.9 2024 04:11 PM    ALKPHO 84 2024 04:11 PM    BILT 0.6 2024 04:11 PM    TP  7.0 2024 04:11 PM    AST 20 2024 04:11 PM    ALT 19 2024 04:11 PM    INR 1.03 2023 11:51 AM    PTP 13.4 2023 11:51 AM    TSH 1.963 2024 01:13 PM    PSA 0.8 2019 11:37 AM    DDIMER 0.42 2024 09:38 PM    PGLU 93 2024 09:06 AM       Labs drawn: na    /44   Pulse 50   SpO2 93%     Clinical weights: 1) 202  DC wt:  24 @  Home weights:  1)     General appearance: alert, appears stated age and cooperative  Neck: no JVD  Lungs: clear to auscultation bilaterally, diminished L base  Heart: S1, S2 normal, no murmur, click, rub or gallop, regular rate and rhythm  Abdomen: soft, non-tender; bowel sounds normal; no masses,  no abdominal distension  Extremities: extremities normal, atraumatic, no cyanosis or edema  Pulses: 2+ and symmetric  Neurologic: Grossly normal    Diagnostics:  Rhythm strip today: SR 93 bpm   EC24, sinus tach    CXR: 24  FINDINGS:   CARDIAC/VASC: The cardiac silhouette is not enlarged.  There is mild calcification of the aortic knob.  Unremarkable pulmonary vasculature.   MEDIAST/JUAN:   No visible mass or adenopathy.   LUNGS/PLEURA: The lungs are again hyperinflated with diffuse coarsening of bronchovascular markings suggesting emphysema.  There is stable mild scarring at the left lung base.  Otherwise no focal consolidation, pleural effusion or pneumothorax.   BONES: Chronic rib fractures are again noted bilaterally.  There is stable moderate acromioclavicular joint osteoarthritis bilaterally.  Spondylotic changes are seen in the thoracic spine.     PFT's: none      MMMR: 2    In-Check dial : 60 L/ min peak inspiratory flow monitoring performed.  \"passing\"  for anoro    6 Minute Walk    Results at Rest  Resting SpO2 (minimum): 92 %  Resting HR (max): 65  Resting Oxygen Device: None    Ambulatory Results     SpO2 with exertion (minimum): 90  HR with exertion (max): 125  Intervention oxygen device: None        Number of laps  made: 9 laps  Distance Ambulated (ft): 675 ft    Recovery Results  Minutes required to return to resting level: 1  Recovery SpO2: 96 %  Recovery HR: 93    6 Min Walk Results  Exertion Scale: Very light  Angina Scale: None  Dyspnea Scale: No Dyspnea    Vaccines:   Pneumonia :Pneumovax 23 6/20 , Prevnar 13 -4/21  , Prevnar 20 --  Flu : 2023  Covid vaccines:  x4, 6/22  RSV: --      Assessment:  Acute exacerbation of COPD  -+ rhinovirus and enterovirus   -started on anoro   -symptoms greatly improved  - persistent productive cough, improving   -peak inspiratory flow monitoring performed.  \"passing\"  for anoro at 60 L /min.     Acute on chronic respiratory failure   -home on 02, I-2  L n/c during day and night  - tolerated walking 675 feet, 02 sat 96% on RA in recovery, HR 93 bpm  -Sa02= 94-96% rest, low of 90% walking, 90-93% with ambulation, HR up to 135 bpm, 02 sat 96% in recovery .  -can use home 02 prn with exertion if walking 300 feet or more and prn at rest, goal to keep Sa02 at 90% or greater      Former smoker   -45 pk/yr hx  - quit 8/2024  -remains quit, motivated to stay quit  -smoking cessation counseling given    Plan:     Patient Instructions   Continue all your medications as prescribed     Can use albuterol inhaler 2 puffs every 4 hours as needed for wheezing or shortness of breath     Follow up with Dr. Gonzalez on 12/24/24     Follow up with the specialty care clinic as needed or as directed     Continue wearing oxygen at 1 liter per nasal cannula with walking more than 200 feet or exertion, and at night, keep oxygen saturation at 90% or greater , can have oxygen off at rest.     Use the incentive spirometer taking 10 slow inhaled breaths followed by cough, 4 times a day.    Consider having influenza, covid, pneumonia and RSV vaccines in the next few weeks.         Call if you are having shortness of breath, cough, wheezing, chest pain, dizziness, lightheadedness, heart racing, palpitations, swelling,  rapid weight gain, fatigue, weakness, fever or chills.  Call 911 with severe shortness of breath, chest pain or chest pressure not improved after 15 minutes of rest or if feeling faint,  passing out or having a fall     Keep daily fluids at 64 ounces per day (2 liters of liquid or 8 , 8 oz glasses of liquid)    Heart healthy diet. Limit sodium to  2000 mg daily. Common high sodium foods include frozen dinners, soups (not homemade), some cereal, vegetable juice, canned vegetables, lunch meats, processed meats like hotdogs, sausage, abbasi, pepperoni, soy sauce, pre-packaged rice or potatoes. Please remember to read nutrition labels for sodium content.   www.healthyeating.nhlbi.nih.gov    Exercise daily as tolerated, with goal of doing moderate aerobic exercise like walking for about 30 minutes 5 days per week. Start by walking at a slow to moderate pace for 3-5 minutes 2-3 times a day. Pace your activity to prevent shortness of breath or fatigue. Stop exercise if you develop chest pain, lightheadedness, or significant shortness of breath    Always carry a copy of your current medication list with you        Current Outpatient Medications:     ALBUTEROL 108 (90 Base) MCG/ACT Inhalation Aero Soln, Inhale 2 puffs into the lungs every 4 (four) hours as needed for Wheezing or Shortness of Breath., Disp: 8.5 g, Rfl: 0    umeclidinium-vilanterol 62.5-25 MCG/ACT Inhalation Aerosol Powder, Breath Activated, Inhale 1 puff into the lungs daily., Disp: 1 each, Rfl: 0    celecoxib 200 MG Oral Cap, Take 1 capsule (200 mg total) by mouth 2 (two) times daily with meals., Disp: 180 capsule, Rfl: 1    traMADol 50 MG Oral Tab, Take 1-2 tablets ( mg total) by mouth., Disp: , Rfl:     lisinopril 10 MG Oral Tab, Take 1 tablet (10 mg total) by mouth daily., Disp: 90 tablet, Rfl: 3    simvastatin 20 MG Oral Tab, Take 1 tablet (20 mg total) by mouth every evening., Disp: 90 tablet, Rfl: 3    Ascorbic Acid (VITAMIN C) 1000 MG Oral Tab,  Take 1 tablet (1,000 mg total) by mouth daily., Disp: , Rfl:     ALO SALOMON NP  12/17/2024       60 minutes spent on patient education, chart review and documentation:  Patient instructed regarding clinic procedures, hours, purpose of clinic visits, sodium restricted diet, low sodium foods, fluid restrictions, daily weights, medication regimen s/s of copd exacerbation and when to call APN/clinic. COPD action plan reviewed and given to patient. Smoking cessation counseling given. IS teaching reinforced.  Provided patient counseling, coordination of care and education given. Patient receptive.

## 2024-12-17 NOTE — PROGRESS NOTES
Subjective: Enmanuel is here post hospital stay for COPD. He was given oxygen when he was discharged, and started on Anoro inhaler. States he still has productive cough at times, 6MW completed.     Home equipment:    oxygen -  yes, new to patient ,   IS - given with instruction and return demonstration.    acupella  - no  inhalers - yes- Anoro Ellipta  Intervention:  6MW - yes   Nebulizer treatment - no  Labs -no   In-Check dial : 60 L/ min peak inspiratory flow monitoring performed.  \"passing\"  for - Anoro ellipta.    Patient and medication assessed. Discussed with APN. Treatments completed- instructed in IS use, 6 MW, Incheck reading .  Medications given- none . Extensive education of disease management including- deep breathing exercises. .  Reviewed AVS instructions. Patient verbalized understanding.

## 2024-12-18 RX ORDER — SIMVASTATIN 20 MG
20 TABLET ORAL EVERY EVENING
Qty: 90 TABLET | Refills: 3 | Status: SHIPPED | OUTPATIENT
Start: 2024-12-18

## 2024-12-18 RX ORDER — LISINOPRIL 10 MG/1
10 TABLET ORAL DAILY
Qty: 90 TABLET | Refills: 3 | Status: SHIPPED | OUTPATIENT
Start: 2024-12-18

## 2024-12-18 NOTE — TELEPHONE ENCOUNTER
Refill passed per [facility] protocol.  Requested Prescriptions   Pending Prescriptions Disp Refills    LISINOPRIL 10 MG Oral Tab [Pharmacy Med Name: LISINOPRIL 10MG TABLETS] 90 tablet 3     Sig: TAKE 1 TABLET(10 MG) BY MOUTH DAILY       Hypertension Medications Protocol Passed - 12/18/2024  3:32 PM        Passed - CMP or BMP in past 12 months        Passed - Last BP reading less than 140/90     BP Readings from Last 1 Encounters:   12/17/24 100/44               Passed - In person appointment or virtual visit in the past 12 mos or appointment in next 3 mos     Recent Outpatient Visits              Yesterday COPD exacerbation ([facility])    [facility] Damari Rivera NP    Office Visit    1 week ago COPD exacerbation ([facility])    [facility] Betito Garcia MD    Office Visit    2 months ago Essential hypertension with goal blood pressure less than 140/90    [facility] Betito Garcai MD    Office Visit    8 months ago Chronic obstructive pulmonary disease, unspecified COPD type ([facility])    [facility] Betito Garcia MD    Office Visit    9 months ago Dizziness    [facility] Yolie Rasheed APRN    Office Visit          Future Appointments         Provider Department Appt Notes    In 6 days Gwen Gonzalez,  [facility] hosp f/u    In 3 months Betito Garcia MD [facility]                     Passed - EGFRCR or GFRNAA > 50     GFR Evaluation  EGFRCR: 94 , resulted on 12/5/2024            SIMVASTATIN 20 MG Oral Tab [Pharmacy Med Name: SIMVASTATIN 20MG TABLETS] 90 tablet 3     Sig: TAKE 1 TABLET(20 MG) BY MOUTH EVERY EVENING       Cholesterol Medication Protocol Passed - 12/18/2024  3:32 PM        Passed - ALT < 80      Lab Results   Component Value Date    ALT 19 03/11/2024             Passed - ALT resulted within past year        Passed - Lipid panel within past 12 months     Lab Results   Component Value Date    CHOLEST 100 06/03/2024    TRIG 123 06/03/2024    HDL 42 06/03/2024    LDL 36 06/03/2024    VLDL 17 06/03/2024    NONHDLC 58 06/03/2024             Passed - In person appointment or virtual visit in the past 12 mos or appointment in next 3 mos     Recent Outpatient Visits              Yesterday COPD exacerbation (HCC)    [facility] Damari Rivera, JOHN    Office Visit    1 week ago COPD exacerbation ([facility])    [facility] Betito Garcia MD    Office Visit    2 months ago Essential hypertension with goal blood pressure less than 140/90    [facility] Betito Garcia MD    Office Visit    8 months ago Chronic obstructive pulmonary disease, unspecified COPD type ([facility])    [facility] Betito Garcia MD    Office Visit    9 months ago Dizziness    [facility] Yolie Rasheed APRN    Office Visit          Future Appointments         Provider Department Appt Notes    In 6 days Gwen Gonzalez DO [facility] [place] [facility] f/u    In 3 months Betito Garcia MD [facility]                        Future Appointments         Provider Department Appt Notes    In 6 days Gwen Gonzalez DO [facility] [place] [facility] f/u    In 3 months Betito Garcia MD [facility]           Recent Outpatient Visits              Yesterday COPD exacerbation ([facility])    [facility] Damari Rivera NP    Office Visit    1 week ago COPD exacerbation ([facility])     [facility], Betito Hwang MD    Office Visit    2 months ago Essential hypertension with goal blood pressure less than 140/90    [facility], Betito Hwang MD    Office Visit    8 months ago Chronic obstructive pulmonary disease, unspecified COPD type (HCC)    [facility], Betito Hwang MD    Office Visit    9 months ago Dizziness    [facility] [place]    Office Visit

## 2024-12-24 ENCOUNTER — OFFICE VISIT (OUTPATIENT)
Dept: PULMONOLOGY | Facility: CLINIC | Age: 74
End: 2024-12-24

## 2024-12-24 VITALS
OXYGEN SATURATION: 97 % | HEIGHT: 68 IN | BODY MASS INDEX: 30.62 KG/M2 | SYSTOLIC BLOOD PRESSURE: 98 MMHG | RESPIRATION RATE: 16 BRPM | HEART RATE: 75 BPM | WEIGHT: 202 LBS | DIASTOLIC BLOOD PRESSURE: 59 MMHG

## 2024-12-24 DIAGNOSIS — Z87.891 PERSONAL HISTORY OF TOBACCO USE, PRESENTING HAZARDS TO HEALTH: ICD-10-CM

## 2024-12-24 DIAGNOSIS — J44.9 CHRONIC OBSTRUCTIVE PULMONARY DISEASE, UNSPECIFIED COPD TYPE (HCC): ICD-10-CM

## 2024-12-24 DIAGNOSIS — R06.00 DYSPNEA, UNSPECIFIED TYPE: Primary | ICD-10-CM

## 2024-12-24 PROCEDURE — 3074F SYST BP LT 130 MM HG: CPT | Performed by: INTERNAL MEDICINE

## 2024-12-24 PROCEDURE — 3078F DIAST BP <80 MM HG: CPT | Performed by: INTERNAL MEDICINE

## 2024-12-24 PROCEDURE — 99214 OFFICE O/P EST MOD 30 MIN: CPT | Performed by: INTERNAL MEDICINE

## 2024-12-24 PROCEDURE — 94761 N-INVAS EAR/PLS OXIMETRY MLT: CPT | Performed by: INTERNAL MEDICINE

## 2024-12-24 PROCEDURE — 1126F AMNT PAIN NOTED NONE PRSNT: CPT | Performed by: INTERNAL MEDICINE

## 2024-12-24 PROCEDURE — 3008F BODY MASS INDEX DOCD: CPT | Performed by: INTERNAL MEDICINE

## 2024-12-24 PROCEDURE — 1159F MED LIST DOCD IN RCRD: CPT | Performed by: INTERNAL MEDICINE

## 2024-12-24 NOTE — PROGRESS NOTES
Discontinue oxygen order, demographic face sheet, chart notes, and O2 saturations have been faxed to [facility] with confirmation.

## 2024-12-30 ENCOUNTER — TELEPHONE (OUTPATIENT)
Dept: PULMONOLOGY | Facility: CLINIC | Age: 74
End: 2024-12-30

## 2024-12-30 NOTE — TELEPHONE ENCOUNTER
Patient states that he saw MD on 12/24/24 and was taken off Oxygen.  Orders to cancel Oxygen servive was supposed to be sent to [facility], so they can  equipment.  He states that [facility] has not contacted him to .  Please call

## 2025-01-02 NOTE — TELEPHONE ENCOUNTER
Spoke to [facility], order received and they will reach out to patient to set up  time/day for oxygen. Patient notified and verbalized understanding. Patient asking for refill of inhaler.    Dr Gonzalez- please sign pended order

## 2025-01-13 ENCOUNTER — PATIENT OUTREACH (OUTPATIENT)
Dept: CASE MANAGEMENT | Age: 75
End: 2025-01-13

## 2025-01-13 DIAGNOSIS — I70.0 ATHEROSCLEROSIS OF AORTA (HCC): Primary | ICD-10-CM

## 2025-01-13 DIAGNOSIS — E78.2 MIXED HYPERLIPIDEMIA: ICD-10-CM

## 2025-01-13 DIAGNOSIS — J43.1 PANLOBULAR EMPHYSEMA (HCC): ICD-10-CM

## 2025-01-13 DIAGNOSIS — E66.09 CLASS 1 OBESITY DUE TO EXCESS CALORIES WITHOUT SERIOUS COMORBIDITY WITH BODY MASS INDEX (BMI) OF 31.0 TO 31.9 IN ADULT: ICD-10-CM

## 2025-01-13 DIAGNOSIS — E66.811 CLASS 1 OBESITY DUE TO EXCESS CALORIES WITHOUT SERIOUS COMORBIDITY WITH BODY MASS INDEX (BMI) OF 31.0 TO 31.9 IN ADULT: ICD-10-CM

## 2025-01-13 DIAGNOSIS — J44.1 COPD EXACERBATION (HCC): ICD-10-CM

## 2025-01-13 NOTE — PROGRESS NOTES
Spoke to Enmanuel for CCM.      Updates to patient care team/comments: None   Patient reported changes in medications: None    Med Adherence  Comment: Pt confirmed he is taking medications as instructed by their providers.     Health Maintenance:   Health Maintenance   Topic Date Due    Zoster Vaccines (2 of 3) 11/30/2015 [facility]    Influenza Vaccine (1) 10/01/2024    Annual Well Visit  01/01/2025 Updated    Annual Depression Screening  01/01/2025 Updated    Fall Risk Screening (Annual)  01/01/2025    Lung Cancer Screening  05/14/2025 (Originally 12/14/2003)    COVID-19 Vaccine (5 - 2024-25 season) 10/27/2025 (Originally 9/1/2024)    Colorectal Cancer Screening  08/13/2027    Pneumococcal Vaccine: 50+ Years  Completed    Meningococcal B Vaccine  Aged Out       Patient updates/concerns:     The patient reports that his back pain is gradually improving. He is now able to stand for longer periods of time and with greater ease. He mentions that walking has been manageable since his surgery 18 month ago.     Additionally, he notes an issue with Anoro inhaler coverage. The copay for it is now $500, whereas he previously paid $40. He is unsure why this change has occurred.    Goals/Action Plan:    Active goal from previous outreach:   Continue with  PT exercises and lose 5 lbs get down to 198 lbs          Where/when/how: By eating healthier, cutting back on portions and snacks.      Patient reported progress towards goals:                - What: He continues to work towards his goal.           - Where/When/How: He is walking daily averaging approximately 3,000 steps.  Patient Reported Barriers and Concerns: Weather                   - Plan for overcoming barriers: He reports he will walk outdoors as long as the weather is above 40.     Care Managers Interventions:   I contacted [facility] pharmacy and was informed that the patient has a deductible that must be met before his insurance coverage applies. As  the patient is a . I recommended he explore [facility] to reduce the out of pocket cost, as the current copay is a significant amount for a single prescription. He agreed and expressed his intention to utilized [facility] and as many others as possible to save on cost.     Nutrition and exercise counseling: I encouraged the patient to consume three balanced meals daily and discussed the importance of incorporating 20-30 minutes of daily exercise, including gentle stretches. To improve overall health.    Chat review and documentation: I thoroughly reviewed the patient chart and updated Care Everywhere.    Risk Screening: The patient's fall  and depression screen were reassessed and updated accordingly.     Immunization Status: I conducted an [employer] immunization query, which confirmed that no updates were necessary at this time.     Social determinants of health: I updated social determinants of health to reflect any recent changes.    Patient support: I actively listened to the patient's concern's, provided emotional support and encouraged the patient to reach out, if he needs further assistance.     Reviewed Future Appointments:   Future Appointments   Date Time Provider Department Center   4/14/2025  5:00 PM Betito Garcia MD [facility] Care Manager Follow Up Date: One month. Encouraged patient to call as needed.    Reason For Follow Up: review progress and or barriers towards patient's goals.     Time Spent This Encounter Total: 28 min medical record review, telephone communication, care plan updates where needed, education, goals, and action plan recreation/update. Provided acknowledgment and validation to patient's concerns.   Monthly Minute Total including today: 28 min  Physical assessment, complete health history, and need for CCM established by Betito Garcia MD.  Friendly reminder - 2025 Benefits Open Enrollment is here!   We encourage  you to review your current Medicare coverage and explore your options during this period. We have developed a Medicare Information Page on our website to serve as a resource for guidance and answers to any questions you might have.   You can access it by visiting, [email or web address]

## 2025-02-03 ENCOUNTER — LAB ENCOUNTER (OUTPATIENT)
Dept: LAB | Age: 75
End: 2025-02-03
Attending: INTERNAL MEDICINE
Payer: MEDICARE

## 2025-02-03 DIAGNOSIS — D72.829 LEUKOCYTOSIS, UNSPECIFIED TYPE: ICD-10-CM

## 2025-02-03 LAB
DEPRECATED RDW RBC AUTO: 50 FL (ref 35.1–46.3)
ERYTHROCYTE [DISTWIDTH] IN BLOOD BY AUTOMATED COUNT: 14 % (ref 11–15)
HCT VFR BLD AUTO: 44.1 %
HGB BLD-MCNC: 14.7 G/DL
MCH RBC QN AUTO: 32 PG (ref 26–34)
MCHC RBC AUTO-ENTMCNC: 33.3 G/DL (ref 31–37)
MCV RBC AUTO: 95.9 FL
PLATELET # BLD AUTO: 219 10(3)UL (ref 150–450)
RBC # BLD AUTO: 4.6 X10(6)UL
WBC # BLD AUTO: 9.8 X10(3) UL (ref 4–11)

## 2025-02-03 PROCEDURE — 85027 COMPLETE CBC AUTOMATED: CPT

## 2025-02-03 PROCEDURE — 36415 COLL VENOUS BLD VENIPUNCTURE: CPT

## 2025-02-04 ENCOUNTER — APPOINTMENT (OUTPATIENT)
Dept: GENERAL RADIOLOGY | Facility: HOSPITAL | Age: 75
End: 2025-02-04
Attending: EMERGENCY MEDICINE
Payer: MEDICARE

## 2025-02-04 ENCOUNTER — HOSPITAL ENCOUNTER (EMERGENCY)
Facility: HOSPITAL | Age: 75
Discharge: HOME | End: 2025-02-05
Attending: EMERGENCY MEDICINE
Payer: MEDICARE

## 2025-02-04 ENCOUNTER — APPOINTMENT (OUTPATIENT)
Dept: CT IMAGING | Facility: HOSPITAL | Age: 75
End: 2025-02-04
Payer: MEDICARE

## 2025-02-04 ENCOUNTER — APPOINTMENT (OUTPATIENT)
Dept: CT IMAGING | Facility: HOSPITAL | Age: 75
End: 2025-02-04
Attending: EMERGENCY MEDICINE
Payer: MEDICARE

## 2025-02-04 DIAGNOSIS — S01.01XA LACERATION OF SCALP, INITIAL ENCOUNTER: ICD-10-CM
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
## 2025-02-04 DIAGNOSIS — S52.92XA LEFT FOREARM FRACTURE, CLOSED, INITIAL ENCOUNTER: Primary | ICD-10-CM

## 2025-02-04 DIAGNOSIS — G44.319 ACUTE POST-TRAUMATIC HEADACHE, NOT INTRACTABLE: ICD-10-CM

## 2025-02-04 DIAGNOSIS — S06.5XAA ACUTE SUBDURAL HEMATOMA (HCC): ICD-10-CM

## 2025-02-04 LAB
ANION GAP SERPL CALC-SCNC: 7 MMOL/L (ref 0–18)
BASOPHILS # BLD AUTO: 0.06 X10(3) UL (ref 0–0.2)
BASOPHILS NFR BLD AUTO: 0.3 %
BUN BLD-MCNC: 17 MG/DL (ref 9–23)
BUN/CREAT SERPL: 17.7 (ref 10–20)
CALCIUM BLD-MCNC: 8.7 MG/DL (ref 8.7–10.4)
CHLORIDE SERPL-SCNC: 101 MMOL/L (ref 98–112)
CO2 SERPL-SCNC: 30 MMOL/L (ref 21–32)
CREAT BLD-MCNC: 0.96 MG/DL
DEPRECATED RDW RBC AUTO: 50.3 FL (ref 35.1–46.3)
EGFRCR SERPLBLD CKD-EPI 2021: 82 ML/MIN/1.73M2 (ref 60–?)
EOSINOPHIL # BLD AUTO: 0.22 X10(3) UL (ref 0–0.7)
EOSINOPHIL NFR BLD AUTO: 1.2 %
ERYTHROCYTE [DISTWIDTH] IN BLOOD BY AUTOMATED COUNT: 14.4 % (ref 11–15)
GLUCOSE BLD-MCNC: 119 MG/DL (ref 70–99)
HCT VFR BLD AUTO: 42.8 %
HGB BLD-MCNC: 14.8 G/DL
IMM GRANULOCYTES # BLD AUTO: 0.16 X10(3) UL (ref 0–1)
IMM GRANULOCYTES NFR BLD: 0.9 %
LYMPHOCYTES # BLD AUTO: 1.65 X10(3) UL (ref 1–4)
LYMPHOCYTES NFR BLD AUTO: 9.2 %
MCH RBC QN AUTO: 32.5 PG (ref 26–34)
MCHC RBC AUTO-ENTMCNC: 34.6 G/DL (ref 31–37)
MCV RBC AUTO: 94.1 FL
MONOCYTES # BLD AUTO: 1.07 X10(3) UL (ref 0.1–1)
MONOCYTES NFR BLD AUTO: 6 %
NEUTROPHILS # BLD AUTO: 14.7 X10 (3) UL (ref 1.5–7.7)
NEUTROPHILS # BLD AUTO: 14.7 X10(3) UL (ref 1.5–7.7)
NEUTROPHILS NFR BLD AUTO: 82.4 %
OSMOLALITY SERPL CALC.SUM OF ELEC: 289 MOSM/KG (ref 275–295)
PLATELET # BLD AUTO: 200 10(3)UL (ref 150–450)
POTASSIUM SERPL-SCNC: 4.2 MMOL/L (ref 3.5–5.1)
RBC # BLD AUTO: 4.55 X10(6)UL
SODIUM SERPL-SCNC: 138 MMOL/L (ref 136–145)
WBC # BLD AUTO: 17.9 X10(3) UL (ref 4–11)

## 2025-02-04 PROCEDURE — 70450 CT HEAD/BRAIN W/O DYE: CPT | Performed by: EMERGENCY MEDICINE

## 2025-02-04 PROCEDURE — 73090 X-RAY EXAM OF FOREARM: CPT | Performed by: EMERGENCY MEDICINE

## 2025-02-05 ENCOUNTER — APPOINTMENT (OUTPATIENT)
Dept: CT IMAGING | Facility: HOSPITAL | Age: 75
End: 2025-02-05
Attending: EMERGENCY MEDICINE
Payer: MEDICARE

## 2025-02-05 ENCOUNTER — APPOINTMENT (OUTPATIENT)
Dept: GENERAL RADIOLOGY | Facility: HOSPITAL | Age: 75
End: 2025-02-05
Attending: EMERGENCY MEDICINE
Payer: MEDICARE

## 2025-02-05 VITALS
BODY MASS INDEX: 31.83 KG/M2 | OXYGEN SATURATION: 90 % | HEIGHT: 68 IN | HEART RATE: 85 BPM | WEIGHT: 210 LBS | DIASTOLIC BLOOD PRESSURE: 63 MMHG | TEMPERATURE: 98 F | RESPIRATION RATE: 23 BRPM | SYSTOLIC BLOOD PRESSURE: 122 MMHG

## 2025-02-05 PROBLEM — G44.319 ACUTE POST-TRAUMATIC HEADACHE, NOT INTRACTABLE: Status: ACTIVE | Noted: 2025-02-05

## 2025-02-05 PROBLEM — S06.5XAA ACUTE SUBDURAL HEMATOMA (HCC): Status: ACTIVE | Noted: 2025-02-05

## 2025-02-05 PROBLEM — R41.0 CONFUSION: Status: ACTIVE | Noted: 2025-02-05

## 2025-02-05 PROBLEM — S09.90XA HEAD TRAUMA, INITIAL ENCOUNTER: Status: ACTIVE | Noted: 2025-02-05

## 2025-02-05 PROBLEM — D72.829 LEUKOCYTOSIS: Status: ACTIVE | Noted: 2025-02-05

## 2025-02-05 LAB
ATRIAL RATE: 82 BPM
P AXIS: 88 DEGREES
P-R INTERVAL: 174 MS
Q-T INTERVAL: 386 MS
QRS DURATION: 76 MS
QTC CALCULATION (BEZET): 450 MS
R AXIS: 48 DEGREES
T AXIS: 71 DEGREES
VENTRICULAR RATE: 82 BPM

## 2025-02-05 PROCEDURE — 99223 1ST HOSP IP/OBS HIGH 75: CPT | Performed by: STUDENT IN AN ORGANIZED HEALTH CARE EDUCATION/TRAINING PROGRAM

## 2025-02-05 PROCEDURE — 99282 EMERGENCY DEPT VISIT SF MDM: CPT | Performed by: HOSPITALIST

## 2025-02-05 PROCEDURE — 71045 X-RAY EXAM CHEST 1 VIEW: CPT | Performed by: EMERGENCY MEDICINE

## 2025-02-05 PROCEDURE — 72125 CT NECK SPINE W/O DYE: CPT | Performed by: EMERGENCY MEDICINE

## 2025-02-05 RX ORDER — MORPHINE SULFATE 2 MG/ML
INJECTION, SOLUTION INTRAMUSCULAR; INTRAVENOUS
Status: COMPLETED
Start: 2025-02-05 | End: 2025-02-05

## 2025-02-05 RX ORDER — ALBUTEROL SULFATE 90 UG/1
2 INHALANT RESPIRATORY (INHALATION) EVERY 4 HOURS PRN
Status: CANCELLED | OUTPATIENT
Start: 2025-02-05

## 2025-02-05 RX ORDER — LIDOCAINE HYDROCHLORIDE 10 MG/ML
INJECTION, SOLUTION EPIDURAL; INFILTRATION; INTRACAUDAL; PERINEURAL
Status: COMPLETED
Start: 2025-02-05 | End: 2025-02-05

## 2025-02-05 RX ORDER — METOCLOPRAMIDE HYDROCHLORIDE 5 MG/ML
10 INJECTION INTRAMUSCULAR; INTRAVENOUS EVERY 8 HOURS PRN
OUTPATIENT
Start: 2025-02-05

## 2025-02-05 RX ORDER — SODIUM CHLORIDE 9 MG/ML
INJECTION, SOLUTION INTRAVENOUS ONCE
OUTPATIENT
Start: 2025-02-05

## 2025-02-05 RX ORDER — ATORVASTATIN CALCIUM 10 MG/1
10 TABLET, FILM COATED ORAL NIGHTLY
Status: CANCELLED | OUTPATIENT
Start: 2025-02-05

## 2025-02-05 RX ORDER — MORPHINE SULFATE 2 MG/ML
2 INJECTION, SOLUTION INTRAMUSCULAR; INTRAVENOUS ONCE
Status: COMPLETED | OUTPATIENT
Start: 2025-02-05 | End: 2025-02-05

## 2025-02-05 RX ORDER — ONDANSETRON 2 MG/ML
4 INJECTION INTRAMUSCULAR; INTRAVENOUS EVERY 6 HOURS PRN
OUTPATIENT
Start: 2025-02-05

## 2025-02-05 RX ORDER — LIDOCAINE HYDROCHLORIDE 10 MG/ML
20 INJECTION, SOLUTION EPIDURAL; INFILTRATION; INTRACAUDAL; PERINEURAL ONCE
Status: COMPLETED | OUTPATIENT
Start: 2025-02-05 | End: 2025-02-05

## 2025-02-05 NOTE — ED QUICK NOTES
Rounding Completed  Plan of Care reviewed. Waiting for room placement .  Elimination needs assessed, urinal at bedside.  Provided repositioning, warm blanket.  Bed is locked and in lowest position. Call light within reach.

## 2025-02-05 NOTE — ED QUICK NOTES
Received report, pt in bed, aox4, in a position of comfort, left arm in sling.  Awaiting admission.  Denies any  needs. Spoke with KENTON castellanos, pt to remain NPO at this time.  Will cont to monitor.

## 2025-02-05 NOTE — DISCHARGE INSTRUCTIONS
Return to emergency room for any fevers of 100.4, redness around laceration site, pus from laceration site.  Wound recheck in 2 days with primary care provider/urgent care.  Staple removal in 5-7 days.    You sustained a head injury today.  After a head injury you are always at risk for delayed brain bleed.  Return to ER for vomiting, changes in mentation, weakness, numbness, losing stool/urine on yourself, etc.   Please have your family member perform neurological checks overnight to ensure no changes to your mentation.      Return to ER for changes in color of skin, decreased pulses in that extremity, hard compartments. If you have an open wound associated with your fracture, signs of infection are redness associated with warmth/pain, pus from site, fevers of 100.4F or above.  Please elevate your your fracture above your heart when at rest to reduce swelling. Please follow with an orthopedist as soon as possible for further guidance regarding your fracture.          The Emergency Department is not intended to be a substitute for an effort to provide complete medical care. The imaging, if any, have often been interpreted on a preliminary basis pending final reading by the radiologist. If your blood pressure was greater than 140/90, please have this blood pressure rechecked by your primary care provider kaylain the next few days. You will benefit from a further discussion of lifestyle modifications that include Weight Reduction - Dietary Sodium Restriction - Increased Physical Activity and Moderation in alcohol (ETOH) Consumption. If possible check your pressure at home and keep a blood pressure log to bring to your physician.

## 2025-02-05 NOTE — ED PROVIDER NOTES
Patient Seen in: [facility] Emergency Department      History     Chief Complaint   Patient presents with    Fall     Stated Complaint: Left arm pain, head injuy, mechanical fall    Subjective:   HPI      The patient is a 74-year-old male with a history of hypertension, COPD who fell down an unknown number of stairs.  He states that he woke up on the floor at the bottom of the stairs.  He does not remember falling.  When he woke up he has pain to the top of his head and his left forearm.  He denies neck pain.  No other pain or injury.  No nausea or vomiting.  He denies any chest pain or palpitations before or after the fall.  He has no recollection of anything since this afternoon.  No recent illness cough fevers or chills.  He was feeling well today.  No abdominal or chest pain.    Objective:     Past Medical History:    Cataract    COPD (chronic obstructive pulmonary disease) (HCC)    Diverticulosis large intestine w/o perforation or abscess w/o bleeding    High blood pressure    High cholesterol    History of tonsillitis    Tonsillectomy; per NG    Internal hemorrhoids    MGD (meibomian gland dysfunction)    OU; per NG    Obesity, unspecified    per NG    Other and unspecified hyperlipidemia    per NG    Overweight (BMI 25.0-29.9)    Pulmonary emphysema (HCC)    PVD (peripheral vascular disease)    per NG    S/P colonoscopic polypectomy    Synovial cyst of lumbar spine              Past Surgical History:   Procedure Laterality Date    Cataract extraction w/  intraocular lens implant Left 02/14/2011    Phaco w/ PC IOL; per NG    Cataract extraction w/  intraocular lens implant Right 11/18/2013    Phaco w/ PC IOL; per NG    Colonoscopy  11/01/2012    repeat in 2017 fall    Colonoscopy N/A 11/08/2017    Procedure: COLONOSCOPY;  Surgeon: Abbie Murphy MD;  Location: [facility] ENDO    Colonoscopy N/A 07/27/2021    Procedure: COLONOSCOPY;  Surgeon: Estiven Alfonso MD;  Location: [facility] ENDOSCOPY     Colonoscopy N/A 2024    Procedure: COLONOSCOPY;  Surgeon: Estiven Alfonso MD;  Location: [facility] ENDOSCOPY    Electrocardiogram, complete  10/22/2013    SCANNED TO MEDIA TAB: 10-    Spine surgery procedure unlisted      Tonsillectomy      per NG                Social History     Socioeconomic History    Marital status:    Tobacco Use    Smoking status: Former     Current packs/day: 0.00     Average packs/day: 1 pack/day for 35.0 years (35.0 ttl pk-yrs)     Types: Cigarettes     Start date:      Quit date:      Years since quittin.1     Passive exposure: Past    Smokeless tobacco: Former    Tobacco comments:     Quit cigs in , less than once a month(cigs)   Vaping Use    Vaping status: Never Used   Substance and Sexual Activity    Alcohol use: Yes     Alcohol/week: 0.0 standard drinks of alcohol     Comment: 1-2 drinks     Drug use: No   Other Topics Concern    Caffeine Concern Yes     Comment: Soda, QOD; per NG    Exercise No   Social History Narrative    The patient does not use an assistive device..      The patient does live in a home with stairs.     Social Drivers of Health     Food Insecurity: No Food Insecurity (2024)    Food Insecurity     Food Insecurity: Never true   Transportation Needs: No Transportation Needs (2024)    Transportation Needs     Lack of Transportation: No   Housing Stability: Low Risk  (2024)    Housing Stability     Housing Instability: No                  Physical Exam     ED Triage Vitals [253]   /70   Pulse 80   Resp 16   Temp 98 °F (36.7 °C)   Temp src    SpO2 96 %   O2 Device None (Room air)       Current Vitals:   Vital Signs  BP: 114/52  Pulse: 74  Resp: 19  Temp: 98 °F (36.7 °C)  MAP (mmHg): 70    Oxygen Therapy  SpO2: 90 %  O2 Device: None (Room air)        Physical Exam  Vitals and nursing note reviewed.   Constitutional:       General: He is not in acute distress.     Appearance: Normal appearance. He is  well-developed. He is not ill-appearing.   HENT:      Head: Normocephalic. Contusion and laceration present.      Jaw: There is normal jaw occlusion.        Right Ear: Tympanic membrane normal.      Left Ear: Tympanic membrane normal.      Mouth/Throat:      Mouth: Mucous membranes are moist.   Eyes:      Extraocular Movements: Extraocular movements intact.      Conjunctiva/sclera: Conjunctivae normal.      Pupils: Pupils are equal, round, and reactive to light.   Neck:      Vascular: No JVD.   Cardiovascular:      Rate and Rhythm: Normal rate and regular rhythm.      Heart sounds: Normal heart sounds. No murmur heard.  Pulmonary:      Effort: Pulmonary effort is normal.      Breath sounds: Normal breath sounds.   Abdominal:      General: Bowel sounds are normal. There is no distension.      Palpations: Abdomen is soft. There is no mass.      Tenderness: There is no abdominal tenderness. There is no right CVA tenderness, left CVA tenderness, guarding or rebound.   Musculoskeletal:         General: Normal range of motion.      Left elbow: Normal.      Left forearm: Swelling, laceration (Superficial abrasion/skin tear mid forearm), tenderness and bony tenderness present.      Left wrist: Normal.      Cervical back: Full passive range of motion without pain, normal range of motion and neck supple. No tenderness. Normal range of motion.      Comments: Also strong and equal throughout   Skin:     General: Skin is warm and dry.      Capillary Refill: Capillary refill takes less than 2 seconds.      Findings: No rash.   Neurological:      General: No focal deficit present.      Mental Status: He is alert and oriented to person, place, and time.      Cranial Nerves: No cranial nerve deficit.      Sensory: No sensory deficit.      Motor: No weakness.      Coordination: Coordination normal.      Deep Tendon Reflexes: Reflexes are normal and symmetric.   Psychiatric:         Judgment: Judgment normal.       Differential  diagnosis includes fracture, head injury, other injury from fall, syncope versus mechanical fall    ED Course     Labs Reviewed   BASIC METABOLIC PANEL (8) - Abnormal; Notable for the following components:       Result Value    Glucose 119 (*)     All other components within normal limits   CBC WITH DIFFERENTIAL WITH PLATELET - Abnormal; Notable for the following components:    WBC 17.9 (*)     RDW-SD 50.3 (*)     Neutrophil Absolute Prelim 14.70 (*)     Neutrophil Absolute 14.70 (*)     Monocyte Absolute 1.07 (*)     All other components within normal limits     EKG    Rate, intervals and axes as noted on EKG Report.  Rate: 82  Rhythm: Sinus Rhythm  Reading: Within normal limits            Laceration repair:    Verbal consent was obtained from the patient.  The 3 cm laceration located to the left parietal scalp was anesthetized in the usual fashion with 1% lidocaine without epinephrine. The wound was scrubbed, draped and explored.   There were no deep structures involved.  The wound was repaired with #3 staples.  The wound repair was simple.  The procedure was performed by myself.    Procedure/splint placement  A long-arm OCL splint was applied to the left forearm after wounds were cleaned and dressed.  After application of the splint I returned and re-examined the patient.  The splint was adequately immobilizing the joint and distal to the splint the patient's circulation and sensation was intact.             MDM      Pulse ox 93% on room air, normal for patient    Admission disposition: 2/5/2025 12:47 AM           Medical Decision Making  Patient with trace subdural and left forearm fracture which was splinted and patient is neurovasc intact postplacement  Patient remained alert and oriented x 4 in the emergency department with no neurodeficits  Will admit to PCU with neurosurgery on consult for neurochecks and further management  Orthopedics also on consult    Problems Addressed:  Accidental fall on or from  stairs or steps, initial encounter: acute illness or injury  Acute subdural hematoma (HCC): acute illness or injury that poses a threat to life or bodily functions  Left forearm fracture, closed, initial encounter: acute illness or injury    Amount and/or Complexity of Data Reviewed  External Data Reviewed: notes.     Details: PCP visit on 12/10/2024 regarding patient's baseline history and meds reviewed  Labs: ordered.  Radiology: ordered and independent interpretation performed.     Details: Left forearm both bone fracture other results per radiologist  ECG/medicine tests: ordered and independent interpretation performed. Decision-making details documented in ED Course.  Discussion of management or test interpretation with external provider(s): Case discussed with hospitalist with consults to neurosurgery and orthopedics    Risk  Parenteral controlled substances.  Decision regarding hospitalization.    Critical Care  Total time providing critical care: minutes (A total of 30 minutes of critical care time (exclusive of billable procedures) was administered to manage the patient's critical imaging findings due to his acute subdural hematoma.  This involved direct patient intervention, complex decision making, and/or extensive discussions with the patient, family, and clinical staff.  )        Disposition and Plan     Clinical Impression:  1. Acute subdural hematoma (HCC)    2. Left forearm fracture, closed, initial encounter    3. Accidental fall on or from stairs or steps, initial encounter         Disposition:  Admit  2/5/2025 12:47 am    Follow-up:  No follow-up provider specified.  We recommend that you schedule follow up care with a primary care provider within the next three months to obtain basic health screening including reassessment of your blood pressure.      Medications Prescribed:  Current Discharge Medication List              Supplementary Documentation:         Hospital Problems       Present on  Admission  Date Reviewed: 12/24/2024            ICD-10-CM Noted POA    * (Principal) Acute subdural hematoma (HCC) S06.5XAA 2/5/2025 Unknown    Leukocytosis D72.829 2/5/2025 Yes

## 2025-02-05 NOTE — ED INITIAL ASSESSMENT (HPI)
Pt to the emergency room after falling down the stairs, pt does not remember the events of the fall but states he fell down approximately 8 stairs. Lac noted to the left forearm, and abrasion to the top of the head. Pt alert and oriented x4. Pt denies being on blood thinners. Possible LOC per pt.

## 2025-02-05 NOTE — ED NOTES
Patient was admitted to hospitalist for a subdural status post a fall downstairs.    Patient was still in the emergency department status post neurosurgical evaluation by Dr. Evans.  No subdural heamtoma   Per neurosurgery, patient may be discharged.        Patient has no complaints.  Status post staples placed by previous provider.  Patient has been placed in a long-arm splint for radius and ulna fracture.  Good cap refill.  Neurosurgery states that patient can follow with neurosurgery as an outpatient in 4 weeks.  Patient encouraged to follow with primary care provider in the next 2 days.  Have encouraged him to stay with family for acute 2-hour neurological checks as he possibly sustained LOC.  Nursing staff to ensure the aforementioned prior to discharge.  Patient has a history of pulmonary emphysema, ex-smoker.  Denies any chest pain, shortness of breath, saturating 90-92%.  X-ray negative for acute pathology.  Patient denies chest pain, shortness of breath.  Outside of forearm fractures, thankfully imaging studies completed by previous provider negative.      Alert and oriented x 3.  Able to navigate his surroundings  2+ radial pulses.  Patient encouraged to follow with orthopedics as an outpatient.  Orthopedics notified as well.      Wound recheck in 2 days.  Staple removal in 5 to 7 days.  Hospitalist notified    .        Traumatic CVA, cervical spine fracture, spine fracture, traumatic cardiac injury (i.e. pneumothorax, cardiac contusion, etc), organ abdominal injury (bowel perforation, organ perforation), among other life-threatening medical conditions considered and seems unlikely given patient's history, exam, and appearance.    Pt agrees and is aware of plan.   Smiling upon discharge      Results for orders placed or performed during the hospital encounter of 02/04/25   EKG    Collection Time: 02/04/25 11:06 PM   Result Value Ref Range    Ventricular rate 82 BPM    Atrial rate 82 BPM    P-R Interval  174 ms    QRS Duration 76 ms    Q-T Interval 386 ms    QTC Calculation (Bezet) 450 ms    P Axis 88 degrees    R Axis 48 degrees    T Axis 71 degrees   Basic Metabolic Panel (8)    Collection Time: 02/04/25 11:23 PM   Result Value Ref Range    Glucose 119 (H) 70 - 99 mg/dL    Sodium 138 136 - 145 mmol/L    Potassium 4.2 3.5 - 5.1 mmol/L    Chloride 101 98 - 112 mmol/L    CO2 30.0 21.0 - 32.0 mmol/L    Anion Gap 7 0 - 18 mmol/L    BUN 17 9 - 23 mg/dL    Creatinine 0.96 0.70 - 1.30 mg/dL    BUN/CREA Ratio 17.7 10.0 - 20.0    Calcium, Total 8.7 8.7 - 10.4 mg/dL    Calculated Osmolality 289 275 - 295 mOsm/kg    eGFR-Cr 82 >=60 mL/min/1.73m2   CBC With Differential With Platelet    Collection Time: 02/04/25 11:23 PM   Result Value Ref Range    WBC 17.9 (H) 4.0 - 11.0 x10(3) uL    RBC 4.55 3.80 - 5.80 x10(6)uL    HGB 14.8 13.0 - 17.5 g/dL    HCT 42.8 39.0 - 53.0 %    MCV 94.1 80.0 - 100.0 fL    MCH 32.5 26.0 - 34.0 pg    MCHC 34.6 31.0 - 37.0 g/dL    RDW-SD 50.3 (H) 35.1 - 46.3 fL    RDW 14.4 11.0 - 15.0 %    .0 150.0 - 450.0 10(3)uL    Neutrophil Absolute Prelim 14.70 (H) 1.50 - 7.70 x10 (3) uL    Neutrophil Absolute 14.70 (H) 1.50 - 7.70 x10(3) uL    Lymphocyte Absolute 1.65 1.00 - 4.00 x10(3) uL    Monocyte Absolute 1.07 (H) 0.10 - 1.00 x10(3) uL    Eosinophil Absolute 0.22 0.00 - 0.70 x10(3) uL    Basophil Absolute 0.06 0.00 - 0.20 x10(3) uL    Immature Granulocyte Absolute 0.16 0.00 - 1.00 x10(3) uL    Neutrophil % 82.4 %    Lymphocyte % 9.2 %    Monocyte % 6.0 %    Eosinophil % 1.2 %    Basophil % 0.3 %    Immature Granulocyte % 0.9 %     Vitals:    02/05/25 0815   BP: 107/63   Pulse: 87   Resp: 18   Temp:      CT BRAIN OR HEAD (CPT=70450)          PROCEDURE: CT BRAIN OR HEAD (CPT=70450)     COMPARISON: None.     INDICATIONS: Left arm pain, head injuy, mechanical fall     TECHNIQUE: CT images were obtained without contrast material.  Automated exposure control for dose reduction was used.  Dose information is  transmitted to the ACR (American College of Radiology) NRDR (National Radiology Data Registry) which includes the   Dose Index Registry.      FINDINGS:   CSF SPACES: Ventricles, cisterns, and sulci are appropriate for age.  No hydrocephalus, subarachnoid hemorrhage, or mass.  No midline shift.    CEREBRUM: No edema, hemorrhage, mass, acute infarction, or significant atrophy.    WHITE MATTER: Normal white matter.    CEREBELLUM: No edema, hemorrhage, mass, acute infarction, or significant atrophy.    BRAINSTEM: No edema, hemorrhage, mass, acute infarction, or significant atrophy.    CALVARIUM: No apparent fracture, mass, or other significant visible lesion.    SINUSES: Limited views demonstrate no significant mucosal thickening or fluid.    ORBITS: Limited views are unremarkable.       OTHER: Negative.                =====  CONCLUSION: No acute intracranial abnormality.           Dictated by (CST): Kenji Orr MD on 2/05/2025 at 6:14 AM       Finalized by (CST): Kenji Orr MD on 2/05/2025 at 6:14 AM                 Basic Metabolic Panel (8)        Component Value Flag Ref Range Units Status    Glucose 119      70 - 99 mg/dL Final    Sodium 138      136 - 145 mmol/L Final    Potassium 4.2      3.5 - 5.1 mmol/L Final    Chloride 101      98 - 112 mmol/L Final    CO2 30.0      21.0 - 32.0 mmol/L Final    Anion Gap 7      0 - 18 mmol/L Final    BUN 17      9 - 23 mg/dL Final    Creatinine 0.96      0.70 - 1.30 mg/dL Final    BUN/CREA Ratio 17.7      10.0 - 20.0  Final    Calcium, Total 8.7      8.7 - 10.4 mg/dL Final    Calculated Osmolality 289      275 - 295 mOsm/kg Final    eGFR-Cr 82      >=60 mL/min/1.73m2 Final                  CBC With Differential With Platelet        Component Value Flag Ref Range Units Status    WBC 17.9      4.0 - 11.0 x10(3) uL Final    RBC 4.55      3.80 - 5.80 x10(6)uL Final    HGB 14.8      13.0 - 17.5 g/dL Final    HCT 42.8      39.0 - 53.0 % Final    MCV 94.1      80.0 - 100.0  fL Final    MCH 32.5      26.0 - 34.0 pg Final    MCHC 34.6      31.0 - 37.0 g/dL Final    RDW-SD 50.3      35.1 - 46.3 fL Final    RDW 14.4      11.0 - 15.0 % Final    .0      150.0 - 450.0 10(3)uL Final    Neutrophil Absolute Prelim 14.70      1.50 - 7.70 x10 (3) uL Final    Neutrophil Absolute 14.70      1.50 - 7.70 x10(3) uL Final    Lymphocyte Absolute 1.65      1.00 - 4.00 x10(3) uL Final    Monocyte Absolute 1.07      0.10 - 1.00 x10(3) uL Final    Eosinophil Absolute 0.22      0.00 - 0.70 x10(3) uL Final    Basophil Absolute 0.06      0.00 - 0.20 x10(3) uL Final    Immature Granulocyte Absolute 0.16      0.00 - 1.00 x10(3) uL Final    Neutrophil % 82.4       % Final    Lymphocyte % 9.2       % Final    Monocyte % 6.0       % Final    Eosinophil % 1.2       % Final    Basophil % 0.3       % Final    Immature Granulocyte % 0.9       % Final                  EKG        Component Value Flag Ref Range Units Status    Ventricular rate 82       BPM Incomplete    Atrial rate 82       BPM Incomplete    P-R Interval 174       ms Incomplete    QRS Duration 76       ms Incomplete    Q-T Interval 386       ms Incomplete    QTC Calculation (Bezet) 450       ms Incomplete    P Axis 88       degrees Incomplete    R Axis 48       degrees Incomplete    T Axis 71       degrees Incomplete                 Normal sinus rhythm  Normal ECG  When compared with ECG of 04-DEC-2024 23:52,  No significant change was found         XR FOREARM (2 VIEWS), LEFT (CPT=73090)                   =====  PROCEDURE: XR FOREARM (2 VIEWS), LEFT (CPT=73090)     COMPARISON: None.     INDICATIONS: Left arm pain post fall.     TECHNIQUE: 2 views were obtained.       FINDINGS/IMPRESSION:         There are comminuted minimally displaced fractures of the mid-diaphysis of the ulna and radius.  There is no radiographic evidence of articular dislocation.        Dictated by (CST): Kenji Orr MD on 2/05/2025 at 6:13 AM       Finalized by (CST):  Kenji Orr MD on 2/05/2025 at 6:14 AM                 CT SPINE CERVICAL (CPT=72125)          PROCEDURE: CT SPINE CERVICAL (CPT=72125)     COMPARISON: [facility], CT BRAIN OR HEAD (CPT=70450), 2/04/2025, 11:54 PM.     INDICATIONS: Left arm pain, head injuy, mechanical fall     TECHNIQUE:   Multi-planar CT images were obtained without intravenous contrast material.  Automated exposure control for dose reduction was used. Adjustment of the mA and/or kV was done based on the patient's size. Use of iterative reconstruction   technique for dose reduction was used.  Dose information is transmitted to the ACR (American College of Radiology) NRDR (National Radiology Data Registry) which includes the Dose Index Registry.        FINDINGS:     Bone mineralization is minimally diminished.     There are 7 cervical type vertebral bodies in gross alignment with no acute fracture/traumatic subluxation.     There are moderate degenerative changes throughout the cervical spine manifested by osteophyte formation, endplate sclerosis, disc space narrowing, and facet hypertrophy.     On soft tissue windows, there is no paravertebral soft tissue hematoma.     On sagittal and coronal reformatted images, the cervical vertebral bodies demonstrate grossly normal alignment.                    =====  CONCLUSION: No acute cervical spine fracture/traumatic subluxation.  Moderate degenerative changes as described.           Dictated by (CST): Kenji Orr MD on 2/05/2025 at 6:24 AM       Finalized by (CST): Kenji Orr MD on 2/05/2025 at 6:25 AM                 XR CHEST AP PORTABLE  (CPT=71045)          PROCEDURE: XR CHEST AP PORTABLE  (CPT=71045)  TIME: 605 hours.       COMPARISON: [facility], XR CHEST AP PORTABLE (CPT=71045), 12/04/2024, 10:02 PM.  [facility], XR CHEST PA + LAT CHEST ([id number]=[id number]), 7/19/2023, 11:59 AM.  [facility], X CHEST PA LAT ROUTINE,    6/13/2014, 1:10 PM.     INDICATIONS: Chest pain post fall.     TECHNIQUE:   Single view.       FINDINGS:   CARDIAC/MEDIAST: Heart and mediastinum are unremarkable.   LUNGS/PLEURA:  Minimal left basilar linear scarring versus atelectasis.  No pleural effusion.  No pneumothorax.  OTHER:  Degenerative change in the osseous structures.              =====  CONCLUSION: No acute cardiopulmonary disease.           Dictated by (CST): Bubba Graves MD on 2/05/2025 at 7:21 AM       Finalized by (CST): Bubba Graves MD on 2/05/2025 at 7:22 AM

## 2025-02-05 NOTE — ED NOTES
Discharge instructions reviewed with pt and son who is at bedside, pt wheeled out into son's care. Son verbalized understanding.

## 2025-02-05 NOTE — CONSULTS
[facility]  Neurological Surgery Established Patient Clinic Note    Enmanuel Lara  12/14/1950  [id number]  PCP: Betito Garcia MD  Consult Provider: Elza Monge MD    REASON FOR CONSULT:  SDH    HISTORY OF PRESENT ILLNESS 2/5/2025:  Enmanuel Lara is a(n) 74 year old male with a history of hypertension, hyperlipidemia, COPD, and prior lumbar spine surgery who presents after falling down a flight of stairs at approximately 10 p.m. last night. He was initially uncertain about the details of the fall due to transient confusion or amnesia but recalls striking his head and left forearm. He sustained a scalp laceration, which required staple repair, and imaging suggested a possible small subdural hematoma on the preliminary read. However, a subsequent radiology over-read indicated no acute intracranial hemorrhage. He reports no persistent headaches, visual disturbances, or neurological deficits. His primary complaint currently is left forearm pain from a confirmed fracture, which has been splinted. Mr. Betts denies use of any blood thinners; he reports a remote history of daily aspirin but discontinued it several years ago.    PAST MEDICAL HISTORY:  Past Medical History:    Cataract    COPD (chronic obstructive pulmonary disease) (HCC)    Diverticulosis large intestine w/o perforation or abscess w/o bleeding    High blood pressure    High cholesterol    History of tonsillitis    Tonsillectomy; per NG    Internal hemorrhoids    MGD (meibomian gland dysfunction)    OU; per NG    Obesity, unspecified    per NG    Other and unspecified hyperlipidemia    per NG    Overweight (BMI 25.0-29.9)    Pulmonary emphysema (HCC)    PVD (peripheral vascular disease)    per NG    S/P colonoscopic polypectomy    Synovial cyst of lumbar spine       PAST SURGICAL HISTORY:  Past Surgical History:   Procedure Laterality Date    Cataract extraction w/  intraocular lens implant Left 02/14/2011    Phaco w/ PC  IOL; per NG    Cataract extraction w/  intraocular lens implant Right 11/18/2013    Phaco w/ PC IOL; per NG    Colonoscopy  11/01/2012    repeat in 2017 fall    Colonoscopy N/A 11/08/2017    Procedure: COLONOSCOPY;  Surgeon: Abbie Murphy MD;  Location: [facility] ENDO    Colonoscopy N/A 07/27/2021    Procedure: COLONOSCOPY;  Surgeon: Estiven Alfonso MD;  Location: [facility] ENDOSCOPY    Colonoscopy N/A 8/13/2024    Procedure: COLONOSCOPY;  Surgeon: Estiven Alfonso MD;  Location: [facility] ENDOSCOPY    Electrocardiogram, complete  10/22/2013    SCANNED TO MEDIA TAB: 10-    Spine surgery procedure unlisted      Tonsillectomy      per NG       FAMILY HISTORY:  family history includes Cancer (age of onset: 53) in his father; Dementia in his mother; Neurological Disorder (age of onset: 81) in his mother.    SOCIAL HISTORY:   reports that he quit smoking about 12 years ago. His smoking use included cigarettes. He started smoking about 47 years ago. He has a 35 pack-year smoking history. He has been exposed to tobacco smoke. He has quit using smokeless tobacco. He reports current alcohol use. He reports that he does not use drugs.    ALLERGIES:  Allergies[1]    MEDICATIONS:  Medications Ordered Prior to Encounter[2]    REVIEW OF SYSTEMS:  All other systems were reviewed and were negative except for those previously mentioned in the HPI     PHYSICAL EXAMINATION:  General: No acute distress.  Respiratory: Non-labored respirations bilaterally. No audible wheezing  Cardiovascular: Extremities warm and well-perfused.  Abdomen: Soft, nontender, nondistended.   Musculoskeletal: Moves all extremities well, symmetrically.  Extremities: No edema.    NEUROLOGIC EXAMINATION:  Mental status: Alert and oriented x 3  Speech: Clear, fluent  Cranial nerves: PERRLA, EOMI, face symmetric, with normal strength and sensation, tongue and palate midline, SCM 5/5 bilaterally  Motor:     RIGHT  Delt 5/5   Bic 5/5  Tri 5/5   HI 5/5     5/5  IP 5/5   Quad 5/5   Ham 5/5   AT 5/5   EHL 5/5 Kelly 5/5     LEFT    Delt 5/5   Bic 5/5  Tri 5/5   HI 5/5    5/5  IP 5/5   Quad 5/5   Ham 5/5   AT 5/5   EHL 5/5 Kelly 5/5   No pronator drift  Tone: Normal  Atrophy/Fasciculations: None  Sensation: Normal to light touch, symmetric, no neglect  Cerebellar: Normal finger nose finger  Gait: Normal, nondistressed heel toe tandem gait      Reflexes: 2+ throughout, symmetric, no Lux's     IMAGING:  CT Head (2/5/2025): No acute intracranial abnormality. No evidence of acute hemorrhage, mass effect, or midline shift.  CT Cervical Spine (2/5/2025): No acute fracture or subluxation. Moderate degenerative changes throughout.    ASSESSMENT:  Mr. Betts is a 74-year-old male with a normal head CT showing no acute intracranial findings. Given his stable neurological examination and absence of any intracranial hemorrhage on definitive imaging, no neurosurgical intervention is indicated at this time. His left forearm fracture is being managed with splinting and orthopedic follow-up. His confusion at the time of admission may have been secondary to the trauma and/or transient disorientation, but he is now fully alert and oriented.    PLAN:  No acute neurosurgical interventions indicated, as the CT scan rules out significant intracranial bleeding.  Outpatient follow-up with neurosurgery in approximately 4 weeks for a repeat non-contrast head CT to definitively exclude a delayed subdural hematoma.  Instructed Mr. Betts to seek immediate medical attention if he develops worsening headaches, confusion, nausea, vomiting, changes in balance, or other neurological symptoms prior to the scheduled follow-up.  Continue management of the forearm fracture as directed by orthopedics.  Maintain normotension and continue usual home antihypertensives to reduce risk of any intracranial bleed.  Patient to follow up with his established spine surgeon regarding persistent low back pain  complaints.     Ángel Evans MD  Neurological Surgery    [facility] [place]  [address], [address]  [place] [address]  [phone]  Pager [phone]  2/5/2025 8:12 AM      This note was created using a voice-recognition transcribing system. Incorrect words or phrases may have been missed during proofreading. Please interpret accordingly.       [1] No Known Allergies  [2]   No current facility-administered medications on file prior to encounter.     Current Outpatient Medications on File Prior to Encounter   Medication Sig Dispense Refill    umeclidinium-vilanterol 62.5-25 MCG/ACT Inhalation Aerosol Powder, Breath Activated Inhale 1 puff into the lungs daily. 1 each 11    lisinopril 10 MG Oral Tab Take 1 tablet (10 mg total) by mouth daily. 90 tablet 3    simvastatin 20 MG Oral Tab Take 1 tablet (20 mg total) by mouth every evening. 90 tablet 3    ALBUTEROL 108 (90 Base) MCG/ACT Inhalation Aero Soln Inhale 2 puffs into the lungs every 4 (four) hours as needed for Wheezing or Shortness of Breath. 8.5 g 0    celecoxib 200 MG Oral Cap Take 1 capsule (200 mg total) by mouth 2 (two) times daily with meals. 180 capsule 1    Ascorbic Acid (VITAMIN C) 1000 MG Oral Tab Take 1 tablet (1,000 mg total) by mouth daily.      traMADol 50 MG Oral Tab Take 1-2 tablets ( mg total) by mouth. (Patient not taking: Reported on 2/4/2025)

## 2025-02-06 NOTE — CONSULTS
[facility]    PATIENT'S NAME: JOAQUÍN SALDANA   ATTENDING PHYSICIAN: Elza Monge MD   CONSULTING PHYSICIAN: Jovita Hull MD   PATIENT ACCOUNT#:   [id number]    LOCATION:  [address]  MEDICAL RECORD #:   [id number]       YOB: 1950  ADMISSION DATE:       02/04/2025      CONSULT DATE:  02/05/2025    REPORT OF CONSULTATION      CHIEF COMPLAINT:  Fall with left arm pain and head laceration.    HISTORY OF PRESENT ILLNESS:  This is a very pleasant 74-year-old gentleman with a past medical history of COPD, diverticulosis, hyperlipidemia, and obesity.  He was in his usual state of health and was walking down the stairs.  He initially told the emergency room physician that he really did not know what happened, suddenly found himself at the bottom of the stairs.  He had severe pain in his left arm and had sustained a cut to his scalp.  He was able to get himself up off the floor.  He denied any loss of consciousness when I talked with him, and he said to me that actually he believes he missed a stair when he was coming down the stairs and tumbled down the remainder of the stair.  When I saw him in the emergency room, he had complained of some pain to the back of his neck and generalized chest soreness as well.  He was quite sure that he had no loss of consciousness.  He denied any dizziness or lightheadedness either prior to or after the fall.  His workup in the emergency room included a CT scan of the brain which is initially read as negative by the [facility]ologist and then a very small subdural hematoma was reported.  Left forearm revealed a comminuted minimally displaced fracture of the mid diaphysis of the ulna and radius with no radiographic evidence of articular dislocation.  Chest x-ray revealed no evidence of any knee fractures or cardiopulmonary abnormality.  White count was 17.9 with a hemoglobin of 14.8 and a platelet count of 200,000; there were 82% neutrophils.  His  glucose was 119, sodium 138, potassium 4.2, chloride 101, CO2 of 30, BUN of 17 with a creatinine of 0.96.  Anion gap was 7, calcium was 8.7.  The emergency room physician spoke with orthopedic and neurosurgical consultations, and the patient was to be admitted to the hospital for further evaluation and treatment.  The patient initially denied to me any palpitation as well.  He also denied any numbness or weakness.    PAST MEDICAL HISTORY:  Significant for COPD, the patient was admitted last year with a COPD exacerbation; diverticulosis; hypertension; hyperlipidemia; history of tonsillitis status post tonsillectomy; hemorrhoid; obesity; cataracts; pulmonary vascular disease; status post polypectomy through colonoscopy; spinal stenosis status post surgical intervention involving placement of screws in the spine; bilateral cataract extractions with intraocular lens implant.    MEDICATIONS:  Prior to admission, umeclidinium/vilanterol 62.5/25 inhalation, lisinopril 10 mg daily, simvastatin 20 mg every evening, albuterol inhaler 2 puffs every 4 hours as needed for wheezing and shortness of breath, celecoxib 200 mg 2 times a day with meals, tramadol 50 mg 1 to 2 tablets q.6 h. as needed for pain.  The patient apparently is not currently taking ascorbic acid, vitamin C 1000 mg daily.    ALLERGIES:  The no known drug allergies.    SOCIAL HISTORY:  The patient lives alone.  He does have children who are helpful.  Typically, he feels comfortable in his living situation and denies frequent falls.  He does have a history of tobacco use disorder, 35 pack-year history, quit in .  Denies any significant alcohol use.    FAMILY HISTORY:  The patient's mother had dementia.  She has passed away.  Father passed away from lung cancer.  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  His wife  of a brainstem infarct at the age of 60.    REVIEW OF SYSTEMS:  Twelve systems were reviewed.  The patient reports that he has a  chronic cough associated with his COPD, but there has been no change in this.  He denies any fever or chills.  No recent URI symptoms.  No nausea, vomiting, diarrhea, constipation, melena, or hematochezia.  No dysuria, increased frequency of urination.  No chest pain or shortness of breath.  There are otherwise no additional pertinent positives and negatives on the 12-point review of systems except as listed in the History of Present Illness.      PHYSICAL EXAMINATION:    VITAL SIGNS:  When I saw the patient in the emergency room, blood pressure was 116/77, pulse 92, respiratory rate 20, O2 saturation 92% to 94% on room air.  HEENT:  The patient had a closed laceration which was closed with 3 staples on the scalp in the left parietal area.  Contusion was present as well.  Pupils were equal, reactive to light.  Extraocular movements were intact.  Tympanic membranes were normal bilaterally.  Mucous membranes were moist.  There was some mild tenderness to palpation over the posterior vertebral bodies of the neck.  No JVD was present.  LUNGS:  Decreased breath sounds bilaterally with prolonged expiratory phase.  No wheezes or rhonchi.  HEART:  Regular rate and rhythm.  Normal S1, S2.  ABDOMEN:  Soft, nontender, with normoactive bowel sounds.  No guarding.  No rebound.  There was no abdominal tenderness or CVA tenderness.  EXTREMITIES:  Without clubbing, cyanosis, or edema.  There was no calf tenderness.  Left upper extremity was wrapped, but the patient had normal motion of fingers with good capillary refill and sensation and warmth.  SKIN:  Warm and dry with the previously mentioned laceration of the scalp.  There was also a superficial abrasion of the left arm with tenderness per the emergency room physician's report.  When I saw the patient, the arm was already wrapped and immobilized.  NEUROLOGIC:  The patient was awake, alert, oriented x3.  There was no facial asymmetry.  There was no focal motor or sensory  deficit.  Cognition was intact.  PSYCHIATRIC:  His mood and affect were pleasant and cooperative.    LABORATORY DATA:  Previously mentioned.    ASSESSMENT AND PLAN:    1.   Mechanical fall.  The patient initially stated to the emergency room physician he was unclear of why he fell.  He tells me that he was still somewhat in shock over the fall and its aftermath, but after spending some time in the emergency room and relaxing a bit, he realizes that he missed a step and fell because of that.  There is nothing consists of an arrhythmia or other etiologies for his fall.  However, review of the previous medical record did indicate that the patient had an episode of a rapid heart rate when he was hospitalized the last time with a COPD exacerbation, so I initially ordered an echocardiogram to follow up on this.  The patient's last echo was an echo stress test in 2013.  His EKG this admission revealed a normal sinus rhythm and was unchanged from previous EKGs.  2.   CT scan results from [facility] reported a small subdural hematoma.  Upon further evaluation, apparently the neurosurgical physician saw the patient after my evaluation and apparently determined that no subdural hematoma was present and he was stable for discharge, so the emergency room physician discharged him.  3.   Chronic obstructive pulmonary disease.  Continue home inhalers.  4.   Comminuted minimally displaced fracture of mid diaphysis of the ulna and radius on the left.  Emergency room physician had consulted Orthopedics for this.  Followup with orthopedic physician as an outpatient was arranged by the emergency room physician, and he was advised to return for wound check in 2 days.  5.   Closed head trauma with scalp laceration, status post placement of 3 staples.  These to be removed in 5 to 7 days per the emergency room physician.  Please see emergency room physician's discharge note.    Dictated By Jovita Hull MD  d: 02/05/2025  23:17:11  t: 02/06/2025 00:28:02  Job [id number]  VICTORINO/

## 2025-02-07 ENCOUNTER — PATIENT OUTREACH (OUTPATIENT)
Dept: CASE MANAGEMENT | Age: 75
End: 2025-02-07

## 2025-02-07 DIAGNOSIS — G89.29 CHRONIC BILATERAL LOW BACK PAIN WITH LEFT-SIDED SCIATICA: ICD-10-CM

## 2025-02-07 DIAGNOSIS — M54.42 CHRONIC BILATERAL LOW BACK PAIN WITH LEFT-SIDED SCIATICA: ICD-10-CM

## 2025-02-07 DIAGNOSIS — I10 ESSENTIAL HYPERTENSION WITH GOAL BLOOD PRESSURE LESS THAN 140/90: ICD-10-CM

## 2025-02-07 DIAGNOSIS — R41.0 CONFUSION: ICD-10-CM

## 2025-02-07 DIAGNOSIS — J44.1 COPD EXACERBATION (HCC): Primary | ICD-10-CM

## 2025-02-07 NOTE — PROGRESS NOTES
Spoke to Enmanuel for CCM.      Updates to patient care team/comments: None  Patient reported changes in medications: None     Med Adherence  Comment: Pt confirmed he is taking medications as instructed by their providers.     Health Maintenance:   Health Maintenance   Topic Date Due    Zoster Vaccines (2 of 3) 11/30/2015    Influenza Vaccine (1) 10/01/2024    Annual Well Visit  01/01/2025    Lung Cancer Screening  05/14/2025 (Originally 12/14/2003)    COVID-19 Vaccine (5 - 2024-25 season) 10/27/2025 (Originally 9/1/2024)    Colorectal Cancer Screening  08/13/2027    Annual Depression Screening  Completed    Fall Risk Screening (Annual)  Completed    Pneumococcal Vaccine: 50+ Years  Completed    Meningococcal B Vaccine  Aged Out     Patient updates/concerns:    The patient reports falling down the stairs in his home on 2/4/2025. He woke up on the floor at the bottom of the stairs but does not remember the fall. Upon waking,he noticed pain in his left arm and chest and immediately recognized  that his arm was broken.  He contacted his son, who transported him to the ED. After evaluation, it was determined that her had sustained a fractured arm. His son later inspected the stairs at his home and discovered a loose step, which was likely the cause of the fall. The step has since been repaired.    He continues to experience pain in his arm and chest and becomes easily winded when walking. He is scheduled to have a cast placed on his arm today.     Additionally, he required about a scheduled surgery on February 12th, which he saw listed in his MyChart. He states that he does not recall having to have surgery.    Goals/Action Plan:    Active goal from previous outreach:   Continue with  PT exercises and lose 5 lbs get down to 198 lbs          Where/when/how: By eating healthier, cutting back    Patient reported progress towards goals:  (New short term goals set)                - What: Recovers post fall           -  Where/When/How: by keeping his appointment for today and clarifying upcoming surgery appointment scheduled.   Patient Reported Barriers and Concerns: Increased pain                   - Plan for overcoming barriers: He will bring up his symtoms at today's appointment and call triage if the symtoms progress.    Care Managers Interventions:   Prevention tips    Floors  To make floors safer:   Put nonskid pads under area rugs.  Remove small rugs.  Replace worn floor coverings.  Tack carpets firmly to each step on carpeted stairs. Put nonskid strips on the edges of uncarpeted stairs.  Keep floors and stairs free of clutter and cords.  Arrange furniture so there are clear pathways.  Clean up any spills right away.  Bathrooms  To make bathrooms safer:   Install grab bars in the tub or shower.  Apply nonskid strips or put a nonskid rubber mat in the tub or shower.  Sit on a bath chair to bathe.  Use bathmats with nonskid backing.  Lighting  To improve visibility in your home:    Keep a flashlight in each room. Or put a lamp next to the bed within easy reach.  Put nightlights in the bedrooms, hallways, kitchen, and bathrooms.  Make sure all stairways have good lighting.  Take your time when going up and down stairs.  Put handrails on both sides of stairs and in walkways for more support. To prevent injury to your wrist or arm, don’t use handrails to pull yourself up.  Install grab bars to pull yourself up.  Move or rearrange items that you use often. This will make them easier to find or reach.  Look at your home to find any safety hazards. Especially look at doorways, walkways, and the driveway. Remove or repair any safety problems that you find.  Other changes to make  Look around to find any safety hazards. Look closely at doorways, walkways, and the driveway. Remove or repair any safety problems that you find.  Wear shoes that fit well.  Take your time when going up and down stairs.  Put handrails on both sides of stairs  and in walkways for more support. To prevent injury to your wrist or arm, don’t use handrails to pull yourself up.  Install grab bars wherever needed to pull yourself up.  Arrange items that you use often. This will make them easier to find or reach.         Chat review and documentation: I thoroughly reviewed the patient chart and updated Care Everywhere.    Risk Screening: The patient's fall was reassessed and updated accordingly.     Social determinants of health: I updated social determinants of health to reflect any recent changes.    Patient support: I actively listened to the patient's concern's, provided emotional support and encouraged the patient to reach out, if he needs further assistance.     Reviewed Future Appointments:   Future Appointments   Date Time Provider Department Center   2/10/2025  1:40 PM Suze Tejada APRN [facility]   3/3/2025 10:30 AM [facility] CT RM1 [facility] CT [facility] [place]   4/14/2025  5:00 PM Betito Garcia MD [facility]       Next Care Manager Follow Up Date: One month. Encouraged patient to call as needed.    Reason For Follow Up: review progress and or barriers towards patient's goals.     Time Spent This Encounter Total: 24 min medical record review, telephone communication, care plan updates where needed, education, goals, and action plan recreation/update. Provided acknowledgment and validation to patient's concerns.   Monthly Minute Total including today: 24 min  Physical assessment, complete health history, and need for CCM established by Betito Garcia MD.  Friendly reminder - 2025 Benefits Open Enrollment is here!   We encourage you to review your current Medicare coverage and explore your options during this period. We have developed a Medicare Information Page on our website to serve as a resource for guidance and answers to any questions you might have.   You can access it by visiting, [email or web address]

## 2025-02-10 ENCOUNTER — OFFICE VISIT (OUTPATIENT)
Dept: INTERNAL MEDICINE CLINIC | Facility: CLINIC | Age: 75
End: 2025-02-10

## 2025-02-10 ENCOUNTER — LAB ENCOUNTER (OUTPATIENT)
Dept: LAB | Age: 75
End: 2025-02-10
Attending: NURSE PRACTITIONER
Payer: MEDICARE

## 2025-02-10 VITALS
HEIGHT: 68 IN | DIASTOLIC BLOOD PRESSURE: 78 MMHG | HEART RATE: 103 BPM | BODY MASS INDEX: 31.52 KG/M2 | SYSTOLIC BLOOD PRESSURE: 123 MMHG | WEIGHT: 208 LBS

## 2025-02-10 DIAGNOSIS — Z01.818 PREOP EXAMINATION: ICD-10-CM

## 2025-02-10 DIAGNOSIS — J96.21 ACUTE ON CHRONIC RESPIRATORY FAILURE WITH HYPOXEMIA (HCC): ICD-10-CM

## 2025-02-10 DIAGNOSIS — Z01.818 PREOP EXAMINATION: Primary | ICD-10-CM
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
## 2025-02-10 DIAGNOSIS — J44.9 CHRONIC OBSTRUCTIVE PULMONARY DISEASE, UNSPECIFIED COPD TYPE (HCC): ICD-10-CM

## 2025-02-10 LAB
BASOPHILS # BLD AUTO: 0.04 X10(3) UL (ref 0–0.2)
BASOPHILS NFR BLD AUTO: 0.3 %
DEPRECATED RDW RBC AUTO: 47.7 FL (ref 35.1–46.3)
EOSINOPHIL # BLD AUTO: 0.13 X10(3) UL (ref 0–0.7)
EOSINOPHIL NFR BLD AUTO: 1.1 %
ERYTHROCYTE [DISTWIDTH] IN BLOOD BY AUTOMATED COUNT: 14 % (ref 11–15)
HCT VFR BLD AUTO: 39.7 %
HGB BLD-MCNC: 13.9 G/DL
IMM GRANULOCYTES # BLD AUTO: 0.04 X10(3) UL (ref 0–1)
IMM GRANULOCYTES NFR BLD: 0.3 %
LYMPHOCYTES # BLD AUTO: 2.13 X10(3) UL (ref 1–4)
LYMPHOCYTES NFR BLD AUTO: 17.9 %
MCH RBC QN AUTO: 32.6 PG (ref 26–34)
MCHC RBC AUTO-ENTMCNC: 35 G/DL (ref 31–37)
MCV RBC AUTO: 93.2 FL
MONOCYTES # BLD AUTO: 1.31 X10(3) UL (ref 0.1–1)
MONOCYTES NFR BLD AUTO: 11 %
NEUTROPHILS # BLD AUTO: 8.28 X10 (3) UL (ref 1.5–7.7)
NEUTROPHILS # BLD AUTO: 8.28 X10(3) UL (ref 1.5–7.7)
NEUTROPHILS NFR BLD AUTO: 69.4 %
PLATELET # BLD AUTO: 217 10(3)UL (ref 150–450)
RBC # BLD AUTO: 4.26 X10(6)UL
WBC # BLD AUTO: 11.9 X10(3) UL (ref 4–11)

## 2025-02-10 PROCEDURE — 3078F DIAST BP <80 MM HG: CPT | Performed by: NURSE PRACTITIONER

## 2025-02-10 PROCEDURE — 85025 COMPLETE CBC W/AUTO DIFF WBC: CPT

## 2025-02-10 PROCEDURE — 1125F AMNT PAIN NOTED PAIN PRSNT: CPT | Performed by: NURSE PRACTITIONER

## 2025-02-10 PROCEDURE — 1111F DSCHRG MED/CURRENT MED MERGE: CPT | Performed by: NURSE PRACTITIONER

## 2025-02-10 PROCEDURE — 3008F BODY MASS INDEX DOCD: CPT | Performed by: NURSE PRACTITIONER

## 2025-02-10 PROCEDURE — 3074F SYST BP LT 130 MM HG: CPT | Performed by: NURSE PRACTITIONER

## 2025-02-10 PROCEDURE — 99214 OFFICE O/P EST MOD 30 MIN: CPT | Performed by: NURSE PRACTITIONER

## 2025-02-10 PROCEDURE — 1159F MED LIST DOCD IN RCRD: CPT | Performed by: NURSE PRACTITIONER

## 2025-02-10 PROCEDURE — 36415 COLL VENOUS BLD VENIPUNCTURE: CPT

## 2025-02-10 RX ORDER — TRAMADOL HYDROCHLORIDE 50 MG/1
TABLET ORAL EVERY 8 HOURS PRN
Qty: 30 TABLET | Refills: 0 | Status: SHIPPED | OUTPATIENT
Start: 2025-02-10

## 2025-02-10 NOTE — PROGRESS NOTES
HPI:    Patient ID: Enmanuel Lara is a 74 year old male.    HPIFall  Presurgical - Moderate Risk for Surgery.  74-year-old male who recently was in the emergency room after a fall.  He fell down an unknown number of stairs.  He woke up on the bottom of the stairs.  He did not remember falling and he may have lost consciousness.  He denies any neck pain.  He had 3 sutures in his head that he asked that I removed today.    Left forearm Fracture  Arm is in a sling.  Hx-of hypertension, COPD.    Does not vape  No smoking  No alcohol- Socialy  No cannibals   Immunization History   Administered Date(s) Administered    Covid-19 Vaccine Pfizer 30 mcg/0.3 ml 01/22/2021, 02/12/2021, 10/25/2021, 06/19/2022    FLU VAC High Dose 65 YRS & Older PRSV Free (91400) 11/09/2020, 10/16/2023    Influenza 11/01/2009    Pneumococcal (Prevnar 13) 10/04/2021    Pneumovax 23 06/23/2020    TDAP 07/02/2019    Tetanus 01/01/2004    Zoster Vaccine Live (Zostavax) 10/05/2015   Deferred Date(s) Deferred    High Dose Fluzone Influenza Vaccine, 65yr+ PF 0.5mL (26537) 12/08/2024       Past Medical History:    Cataract    COPD (chronic obstructive pulmonary disease) (HCC)    Diverticulosis large intestine w/o perforation or abscess w/o bleeding    High blood pressure    High cholesterol    History of tonsillitis    Tonsillectomy; per NG    Internal hemorrhoids    MGD (meibomian gland dysfunction)    OU; per NG    Obesity, unspecified    per NG    Other and unspecified hyperlipidemia    per NG    Overweight (BMI 25.0-29.9)    Pulmonary emphysema (HCC)    PVD (peripheral vascular disease)    per NG    S/P colonoscopic polypectomy    Synovial cyst of lumbar spine      Past Surgical History:   Procedure Laterality Date    Cataract extraction w/  intraocular lens implant Left 02/14/2011    Phaco w/ PC IOL; per NG    Cataract extraction w/  intraocular lens implant Right 11/18/2013    Phaco w/ PC IOL; per NG    Colonoscopy  11/01/2012    repeat in 2017 fall     Colonoscopy N/A 2017    Procedure: COLONOSCOPY;  Surgeon: Abbie Murphy MD;  Location: [facility] ENDO    Colonoscopy N/A 2021    Procedure: COLONOSCOPY;  Surgeon: Estiven Alfonso MD;  Location: [facility] ENDOSCOPY    Colonoscopy N/A 2024    Procedure: COLONOSCOPY;  Surgeon: Estiven Alfonso MD;  Location: [facility] ENDOSCOPY    Electrocardiogram, complete  10/22/2013    SCANNED TO MEDIA TAB: 10-    Rotator cuff repair Bilateral     Spine surgery procedure unlisted      Tonsillectomy      per NG      Social History     Socioeconomic History    Marital status:    Tobacco Use    Smoking status: Former     Current packs/day: 0.00     Average packs/day: 1 pack/day for 35.0 years (35.0 ttl pk-yrs)     Types: Cigarettes     Start date:      Quit date:      Years since quittin.1     Passive exposure: Past    Smokeless tobacco: Former    Tobacco comments:     Quit cigs in , less than once a month(cigs)   Vaping Use    Vaping status: Never Used   Substance and Sexual Activity    Alcohol use: Yes     Alcohol/week: 0.0 standard drinks of alcohol     Comment: 1-2 drinks     Drug use: No   Other Topics Concern    Caffeine Concern Yes     Comment: Soda, QOD; per NG    Exercise No          Review of Systems   Constitutional:  Negative for chills, fatigue and fever.   HENT:  Negative for ear pain, hearing loss, sinus pain, sore throat and trouble swallowing.    Eyes:  Negative for pain and visual disturbance.   Respiratory:  Negative for cough, chest tightness and shortness of breath.    Cardiovascular:  Negative for chest pain, palpitations and leg swelling.   Gastrointestinal:  Negative for abdominal pain, constipation, diarrhea, nausea and vomiting.   Endocrine: Negative for cold intolerance and heat intolerance.   Genitourinary:  Negative for dysuria and hematuria.   Musculoskeletal:  Negative for back pain and joint swelling.   Skin:  Negative for rash.    Allergic/Immunologic: Negative for environmental allergies.   Neurological:  Negative for weakness, numbness and headaches.   Hematological:  Does not bruise/bleed easily.   Psychiatric/Behavioral:  Negative for dysphoric mood and sleep disturbance. The patient is not nervous/anxious.               Current Outpatient Medications   Medication Sig Dispense Refill    traMADol 50 MG Oral Tab Take 1-2 tablets ( mg total) by mouth every 8 (eight) hours as needed for Pain. 30 tablet 0    umeclidinium-vilanterol 62.5-25 MCG/ACT Inhalation Aerosol Powder, Breath Activated Inhale 1 puff into the lungs daily. 1 each 11    lisinopril 10 MG Oral Tab Take 1 tablet (10 mg total) by mouth daily. 90 tablet 3    simvastatin 20 MG Oral Tab Take 1 tablet (20 mg total) by mouth every evening. 90 tablet 3    ALBUTEROL 108 (90 Base) MCG/ACT Inhalation Aero Soln Inhale 2 puffs into the lungs every 4 (four) hours as needed for Wheezing or Shortness of Breath. 8.5 g 0    celecoxib 200 MG Oral Cap Take 1 capsule (200 mg total) by mouth 2 (two) times daily with meals. 180 capsule 1    Ascorbic Acid (VITAMIN C) 1000 MG Oral Tab Take 1 tablet (1,000 mg total) by mouth daily.       Allergies:Allergies[1]   PHYSICAL EXAM:   Physical Exam  Constitutional:       Appearance: Normal appearance. He is well-developed.   HENT:      Head: Normocephalic.      Right Ear: Tympanic membrane normal.      Left Ear: Tympanic membrane normal.      Nose: Nose normal.      Mouth/Throat:      Mouth: Mucous membranes are moist.      Pharynx: No oropharyngeal exudate or posterior oropharyngeal erythema.   Eyes:      General:         Right eye: No discharge.         Left eye: No discharge.      Pupils: Pupils are equal, round, and reactive to light.   Cardiovascular:      Rate and Rhythm: Normal rate and regular rhythm.      Heart sounds: Normal heart sounds. No murmur heard.     No friction rub. No gallop.   Pulmonary:      Effort: Pulmonary effort is normal.  No respiratory distress.      Breath sounds: Normal breath sounds. No wheezing, rhonchi or rales.   Abdominal:      General: Bowel sounds are normal. There is no distension.      Palpations: Abdomen is soft. There is no mass.      Tenderness: There is no abdominal tenderness. There is no right CVA tenderness, left CVA tenderness or guarding.   Musculoskeletal:         General: No tenderness.      Cervical back: Normal range of motion and neck supple. No tenderness.      Right lower leg: No edema.      Left lower leg: No edema.   Lymphadenopathy:      Cervical: No cervical adenopathy.   Skin:     General: Skin is warm and dry.      Findings: No rash.   Neurological:      Mental Status: He is alert and oriented to person, place, and time.      Coordination: Coordination normal.      Gait: Gait normal.   Psychiatric:         Mood and Affect: Mood normal.         Behavior: Behavior normal.         Thought Content: Thought content normal.         Judgment: Judgment normal.       /78 (BP Location: Right arm, Patient Position: Sitting, Cuff Size: large)   Pulse 103   Ht 5' 8\" (1.727 m)   Wt 208 lb (94.3 kg)   BMI 31.63 kg/m²   Wt Readings from Last 2 Encounters:   02/10/25 208 lb (94.3 kg)   02/08/25 208 lb (94.3 kg)     Body mass index is 31.63 kg/m².(2)  Lab Results   Component Value Date    WBC 11.9 (H) 02/10/2025    RBC 4.26 02/10/2025    HGB 13.9 02/10/2025    HCT 39.7 02/10/2025    MCV 93.2 02/10/2025    MCH 32.6 02/10/2025    MCHC 35.0 02/10/2025    RDW 14.0 02/10/2025    .0 02/10/2025    MPV 7.7 01/03/2019      Lab Results   Component Value Date     (H) 02/04/2025    BUN 17 02/04/2025    BUNCREA 17.7 02/04/2025    CREATSERUM 0.96 02/04/2025    ANIONGAP 7 02/04/2025    GFRNAA 86 11/15/2021    GFRAA 99 11/15/2021    CA 8.7 02/04/2025    OSMOCALC 289 02/04/2025    ALKPHO 84 03/11/2024    AST 20 03/11/2024    ALT 19 03/11/2024    ALKPHOS 73 11/30/2015    BILT 0.6 03/11/2024    TP 7.0 03/11/2024     ALB 3.9 03/11/2024    GLOBULIN 3.1 03/11/2024    AGRATIO 1.2 11/30/2015     02/04/2025    K 4.2 02/04/2025     02/04/2025    CO2 30.0 02/04/2025      Lab Results   Component Value Date     12/19/2022    A1C 5.8 (H) 12/19/2022      Lab Results   Component Value Date    CHOLEST 100 06/03/2024    TRIG 123 06/03/2024    HDL 42 06/03/2024    LDL 36 06/03/2024    VLDL 17 06/03/2024    NONHDLC 58 06/03/2024    CALCNONHDL 53 11/30/2015      Lab Results   Component Value Date    TSH 1.963 06/03/2024                ASSESSMENT/PLAN:     Problem List Items Addressed This Visit       Acute on chronic respiratory failure with hypoxemia (HCC)    Chronic airway obstruction (HCC)    Fall     Head Injury    Plan  Can you please remove the win from Enmanuel Lara head when he is under anesthesia on 2/12/2025. I was unable to remove them and it was too painful for him. There are 3 Staples on the top of his head. Thank You, Suze Tejada, NICK Gupta replied YES         Relevant Medications    traMADol 50 MG Oral Tab    Preop examination - Primary      Normal exam/Medically Moderate Risk for srugery.    EKG - WNL  Labs WNL    Stop all ibuprofen, aspirin, over-the-counter cough and cold medications, vitamins as well as supplements for 10 days prior to surgery.     Call if you develop any symptoms of a cough or urinary infection or skin infection prior to surgery.     Follow-up evaluation 2 weeks after surgery.             Relevant Orders    CBC W Differential W Platelet [E] (Completed)          Orders Placed This Encounter   Procedures    CBC W Differential W Platelet [E]       Meds This Visit:  Requested Prescriptions     Signed Prescriptions Disp Refills    traMADol 50 MG Oral Tab 30 tablet 0     Sig: Take 1-2 tablets ( mg total) by mouth every 8 (eight) hours as needed for Pain.       Imaging & Referrals:  None         [facility]          [1] No Known Allergies

## 2025-02-10 NOTE — DISCHARGE INSTRUCTIONS
Please leave waterproof dressing on until f/u    Okay to remove splint 2-3 days post op or okay to leave until f/u.    Sling for comfort.      HOME INSTRUCTIONS  [facility] HOME CARE INSTRUCTIONS: POST-OP ANESTHESIA  The medication that you received for sedation or general anesthesia can last up to 24 hours. Your judgment and reflexes may be altered, even if you feel like your normal self.      We Recommend:   Do not drive any motor vehicle or bicycle   Avoid mowing the lawn, playing sports, or working with power tools/applicances (power saws, electric knives or mixers)   That you have someone stay with you on your first night home   Do not drink alcohol or take sleeping pills or tranquilizers   Do not sign legal documents within 24 hours of your procedure   If you had a nerve block for your surgery, take extra care not to put any pressure on your arm or hand for 24 hours    It is normal:  For you to have a sore throat if you had a breathing tube during surgery (while you were asleep!). The sore throat should get better within 48 hours. You can gargle with warm salt water (1/2 tsp in 4 oz warm water) or use a throat lozenge for comfort  To feel muscle aches or soreness especially in the abdomen, chest or neck. The achy feeling should go away in the next 24 hours  To feel weak, sleepy or \"wiped out\". Your should start feeling better in the next 24 hours.   To experience mild discomforts such as sore lip or tongue, headache, cramps, gas pains or a bloated feeling in your abdomen.   To experience mild back pain or soreness for a day or two if you had spinal or epidural anesthesia.   If you had laparoscopic surgery, to feel shoulder pain or discomfort on the day of surgery.   For some patients to have nausea after surgery/anesthesia    If you feel nausea or experience vomiting:   Try to move around less.   Eat less than usual or drink only liquids until the next morning   Nausea should resolve in about 24 hours    If you  have a problem when you are at home:    Call your surgeons office   Discharge Instructions: After Your Surgery  You’ve just had surgery. During surgery, you were given medicine called anesthesia to keep you relaxed and free of pain. After surgery, you may have some pain or nausea. This is common. Here are some tips for feeling better and getting well after surgery.   Going home  Your healthcare provider will show you how to take care of yourself when you go home. They'll also answer your questions. Have an adult family member or friend drive you home. For the first 24 hours after your surgery:   Don't drive or use heavy equipment.  Don't make important decisions or sign legal papers.  Take medicines as directed.  Don't drink alcohol.  Have someone stay with you, if needed. They can watch for problems and help keep you safe.  Be sure to go to all follow-up visits with your healthcare provider. And rest after your surgery for as long as your provider tells you to.   Coping with pain  If you have pain after surgery, pain medicine will help you feel better. Take it as directed, before pain becomes severe. Also, ask your healthcare provider or pharmacist about other ways to control pain. This might be with heat, ice, or relaxation. And follow any other instructions your surgeon or nurse gives you.      Stay on schedule with your medicine.     Tips for taking pain medicine  To get the best relief possible, remember these points:   Pain medicines can upset your stomach. Taking them with a little food may help.  Most pain relievers taken by mouth need at least 20 to 30 minutes to start to work.  Don't wait till your pain becomes severe before you take your medicine. Try to time your medicine so that you can take it before starting an activity. This might be before you get dressed, go for a walk, or sit down for dinner.  Constipation is a common side effect of some pain medicines. Call your healthcare provider before taking  any medicines such as laxatives or stool softeners to help ease constipation. Also ask if you should skip any foods. Drinking lots of fluids and eating foods such as fruits and vegetables that are high in fiber can also help. Remember, don't take laxatives unless your surgeon has prescribed them.  Drinking alcohol and taking pain medicine can cause dizziness and slow your breathing. It can even be deadly. Don't drink alcohol while taking pain medicine.  Pain medicine can make you react more slowly to things. Don't drive or run machinery while taking pain medicine.  Your healthcare provider may tell you to take acetaminophen to help ease your pain. Ask them how much you're supposed to take each day. Acetaminophen or other pain relievers may interact with your prescription medicines or other over-the-counter (OTC) medicines. Some prescription medicines have acetaminophen and other ingredients in them. Using both prescription and OTC acetaminophen for pain can cause you to accidentally overdose. Read the labels on your OTC medicines with care. This will help you to clearly know the list of ingredients, how much to take, and any warnings. It may also help you not take too much acetaminophen. If you have questions or don't understand the information, ask your pharmacist or healthcare provider to explain it to you before you take the OTC medicine.   Managing nausea  Some people have an upset stomach (nausea) after surgery. This is often because of anesthesia, pain, or pain medicine, less movement of food in the stomach, or the stress of surgery. These tips will help you handle nausea and eat healthy foods as you get better. If you were on a special food plan before surgery, ask your healthcare provider if you should follow it while you get better. Check with your provider on how your eating should progress. It may depend on the surgery you had. These general tips may help:   Don't push yourself to eat. Your body will tell  you when to eat and how much.  Start off with clear liquids and soup. They're easier to digest.  Next try semi-solid foods as you feel ready. These include mashed potatoes, applesauce, and gelatin.  Slowly move to solid foods. Don’t eat fatty, rich, or spicy foods at first.  Don't force yourself to have 3 large meals a day. Instead eat smaller amounts more often.  Take pain medicines with a small amount of solid food, such as crackers or toast. This helps prevent nausea.  When to call your healthcare provider  Call your healthcare provider right away if you have any of these:   You still have too much pain, or the pain gets worse, after taking the medicine. The medicine may not be strong enough. Or there may be a complication from the surgery.  You feel too sleepy, dizzy, or groggy. The medicine may be too strong.  Side effects such as nausea or vomiting. Your healthcare provider may advise taking other medicines to .  Skin changes such as rash, itching, or hives. This may mean you have an allergic reaction. Your provider may advise taking other medicines.  The incision looks different (for instance, part of it opens up).  Bleeding or fluid leaking from the incision site, and weren't told to expect that.  Fever of 100.4°F (38°C) or higher, or as directed by your provider.  Call 911  Call 911 right away if you have:   Trouble breathing  Facial swelling    If you have obstructive sleep apnea   You were given anesthesia medicine during surgery to keep you comfortable and free of pain. After surgery, you may have more apnea spells because of this medicine and other medicines you were given. The spells may last longer than normal.    At home:  Keep using the continuous positive airway pressure (CPAP) device when you sleep. Unless your healthcare provider tells you not to, use it when you sleep, day or night. CPAP is a common device used to treat obstructive sleep apnea.  Talk with your provider before taking any pain  medicine, muscle relaxants, or sedatives. Your provider will tell you about the possible dangers of taking these medicines.  Contact your provider if your sleeping changes a lot even when taking medicines as directed.  [employer] last reviewed this educational content on 10/1/2021  © 5616-3331 [employer]. All rights reserved. This information is not intended as a substitute for professional medical care. Always follow your healthcare professional's instructions.

## 2025-02-11 NOTE — ASSESSMENT & PLAN NOTE
Head Injury    Plan  Can you please remove the win from Enmanuel Lara head when he is under anesthesia on 2/12/2025. I was unable to remove them and it was too painful for him. There are 3 Staples on the top of his head. Thank You, Suze Tejada, NICK Gupta replied YES

## 2025-02-11 NOTE — ASSESSMENT & PLAN NOTE
Normal exam/Medically Moderate Risk for srugery.    EKG - WNL  Labs WNL    Stop all ibuprofen, aspirin, over-the-counter cough and cold medications, vitamins as well as supplements for 10 days prior to surgery.     Call if you develop any symptoms of a cough or urinary infection or skin infection prior to surgery.     Follow-up evaluation 2 weeks after surgery.

## 2025-02-12 ENCOUNTER — APPOINTMENT (OUTPATIENT)
Dept: GENERAL RADIOLOGY | Facility: HOSPITAL | Age: 75
End: 2025-02-12
Attending: STUDENT IN AN ORGANIZED HEALTH CARE EDUCATION/TRAINING PROGRAM
Payer: MEDICARE

## 2025-02-12 ENCOUNTER — HOSPITAL ENCOUNTER (OUTPATIENT)
Facility: HOSPITAL | Age: 75
Setting detail: HOSPITAL OUTPATIENT SURGERY
Discharge: HOME | End: 2025-02-12
Attending: STUDENT IN AN ORGANIZED HEALTH CARE EDUCATION/TRAINING PROGRAM | Admitting: STUDENT IN AN ORGANIZED HEALTH CARE EDUCATION/TRAINING PROGRAM
Payer: MEDICARE

## 2025-02-12 ENCOUNTER — TELEPHONE (OUTPATIENT)
Dept: PULMONOLOGY | Facility: CLINIC | Age: 75
End: 2025-02-12

## 2025-02-12 ENCOUNTER — ANESTHESIA EVENT (OUTPATIENT)
Dept: SURGERY | Facility: HOSPITAL | Age: 75
End: 2025-02-12
Payer: MEDICARE

## 2025-02-12 ENCOUNTER — ANESTHESIA (OUTPATIENT)
Dept: SURGERY | Facility: HOSPITAL | Age: 75
End: 2025-02-12
Payer: MEDICARE

## 2025-02-12 VITALS
BODY MASS INDEX: 31.22 KG/M2 | SYSTOLIC BLOOD PRESSURE: 103 MMHG | HEART RATE: 73 BPM | OXYGEN SATURATION: 95 % | DIASTOLIC BLOOD PRESSURE: 70 MMHG | WEIGHT: 206 LBS | HEIGHT: 68 IN | TEMPERATURE: 98 F | RESPIRATION RATE: 18 BRPM

## 2025-02-12 DIAGNOSIS — S52.91XA FOREARM FRACTURES, BOTH BONES, CLOSED, RIGHT, INITIAL ENCOUNTER: Primary | ICD-10-CM

## 2025-02-12 DIAGNOSIS — S52.201A FOREARM FRACTURES, BOTH BONES, CLOSED, RIGHT, INITIAL ENCOUNTER: Primary | ICD-10-CM

## 2025-02-12 PROCEDURE — 0PSJXZZ REPOSITION LEFT RADIUS, EXTERNAL APPROACH: ICD-10-PCS | Performed by: STUDENT IN AN ORGANIZED HEALTH CARE EDUCATION/TRAINING PROGRAM

## 2025-02-12 PROCEDURE — 64415 NJX AA&/STRD BRCH PLXS IMG: CPT | Performed by: STUDENT IN AN ORGANIZED HEALTH CARE EDUCATION/TRAINING PROGRAM

## 2025-02-12 PROCEDURE — 76000 FLUOROSCOPY <1 HR PHYS/QHP: CPT | Performed by: STUDENT IN AN ORGANIZED HEALTH CARE EDUCATION/TRAINING PROGRAM

## 2025-02-12 PROCEDURE — 0PSJ04Z REPOSITION LEFT RADIUS WITH INTERNAL FIXATION DEVICE, OPEN APPROACH: ICD-10-PCS | Performed by: STUDENT IN AN ORGANIZED HEALTH CARE EDUCATION/TRAINING PROGRAM

## 2025-02-12 PROCEDURE — 0PSL04Z REPOSITION LEFT ULNA WITH INTERNAL FIXATION DEVICE, OPEN APPROACH: ICD-10-PCS | Performed by: STUDENT IN AN ORGANIZED HEALTH CARE EDUCATION/TRAINING PROGRAM

## 2025-02-12 DEVICE — SCREW BNE 3.5X20MM CORT HEX DRV RECESS FT ST: Type: IMPLANTABLE DEVICE | Status: FUNCTIONAL

## 2025-02-12 DEVICE — IMPLANTABLE DEVICE: Type: IMPLANTABLE DEVICE | Status: FUNCTIONAL

## 2025-02-12 DEVICE — IMPLANTABLE DEVICE: Type: IMPLANTABLE DEVICE | Site: ARM | Status: FUNCTIONAL

## 2025-02-12 DEVICE — SCREW BNE 3.5X16MM CORT HEX DRV RECESS FT ST: Type: IMPLANTABLE DEVICE | Site: ARM | Status: FUNCTIONAL

## 2025-02-12 DEVICE — SCREW BNE 3.5X24MM CORT HEX DRV RECESS FT ST: Type: IMPLANTABLE DEVICE | Status: FUNCTIONAL

## 2025-02-12 DEVICE — SCREW BNE 3.5X22MM CORT HEX DRV RECESS FT ST: Type: IMPLANTABLE DEVICE | Status: FUNCTIONAL

## 2025-02-12 RX ORDER — METOCLOPRAMIDE HYDROCHLORIDE 5 MG/ML
10 INJECTION INTRAMUSCULAR; INTRAVENOUS ONCE
Status: COMPLETED | OUTPATIENT
Start: 2025-02-12 | End: 2025-02-12

## 2025-02-12 RX ORDER — HYDROMORPHONE HYDROCHLORIDE 1 MG/ML
0.4 INJECTION, SOLUTION INTRAMUSCULAR; INTRAVENOUS; SUBCUTANEOUS EVERY 5 MIN PRN
Status: DISCONTINUED | OUTPATIENT
Start: 2025-02-12 | End: 2025-02-12

## 2025-02-12 RX ORDER — METOCLOPRAMIDE HYDROCHLORIDE 5 MG/ML
10 INJECTION INTRAMUSCULAR; INTRAVENOUS EVERY 8 HOURS PRN
Status: DISCONTINUED | OUTPATIENT
Start: 2025-02-12 | End: 2025-02-12

## 2025-02-12 RX ORDER — VANCOMYCIN HYDROCHLORIDE 1 G/20ML
INJECTION, POWDER, LYOPHILIZED, FOR SOLUTION INTRAVENOUS AS NEEDED
Status: DISCONTINUED | OUTPATIENT
Start: 2025-02-12 | End: 2025-02-12 | Stop reason: HOSPADM

## 2025-02-12 RX ORDER — CYCLOBENZAPRINE HCL 5 MG
5 TABLET ORAL 3 TIMES DAILY PRN
Qty: 21 TABLET | Refills: 0 | Status: SHIPPED | OUTPATIENT
Start: 2025-02-12

## 2025-02-12 RX ORDER — HYDROCODONE BITARTRATE AND ACETAMINOPHEN 5; 325 MG/1; MG/1
1-2 TABLET ORAL EVERY 6 HOURS PRN
Qty: 24 TABLET | Refills: 0 | Status: SHIPPED | OUTPATIENT
Start: 2025-02-12

## 2025-02-12 RX ORDER — CELECOXIB 200 MG/1
200 CAPSULE ORAL 2 TIMES DAILY
Qty: 30 CAPSULE | Refills: 1 | Status: SHIPPED | OUTPATIENT
Start: 2025-02-12

## 2025-02-12 RX ORDER — LIDOCAINE HYDROCHLORIDE 10 MG/ML
INJECTION, SOLUTION EPIDURAL; INFILTRATION; INTRACAUDAL; PERINEURAL AS NEEDED
Status: DISCONTINUED | OUTPATIENT
Start: 2025-02-12 | End: 2025-02-12 | Stop reason: SURG

## 2025-02-12 RX ORDER — LIDOCAINE HYDROCHLORIDE 10 MG/ML
INJECTION, SOLUTION INFILTRATION; PERINEURAL
Status: COMPLETED | OUTPATIENT
Start: 2025-02-12 | End: 2025-02-12

## 2025-02-12 RX ORDER — HYDROMORPHONE HYDROCHLORIDE 1 MG/ML
0.2 INJECTION, SOLUTION INTRAMUSCULAR; INTRAVENOUS; SUBCUTANEOUS EVERY 5 MIN PRN
Status: DISCONTINUED | OUTPATIENT
Start: 2025-02-12 | End: 2025-02-12

## 2025-02-12 RX ORDER — ACETAMINOPHEN 500 MG
1000 TABLET ORAL EVERY 8 HOURS PRN
Qty: 60 TABLET | Refills: 0 | Status: SHIPPED | OUTPATIENT
Start: 2025-02-12

## 2025-02-12 RX ORDER — TRANEXAMIC ACID 10 MG/ML
1000 INJECTION, SOLUTION INTRAVENOUS ONCE
Status: COMPLETED | OUTPATIENT
Start: 2025-02-12 | End: 2025-02-12

## 2025-02-12 RX ORDER — GABAPENTIN 100 MG/1
100 CAPSULE ORAL 3 TIMES DAILY
Qty: 42 CAPSULE | Refills: 1 | Status: SHIPPED | OUTPATIENT
Start: 2025-02-12

## 2025-02-12 RX ORDER — HYDROMORPHONE HYDROCHLORIDE 1 MG/ML
0.6 INJECTION, SOLUTION INTRAMUSCULAR; INTRAVENOUS; SUBCUTANEOUS EVERY 5 MIN PRN
Status: DISCONTINUED | OUTPATIENT
Start: 2025-02-12 | End: 2025-02-12

## 2025-02-12 RX ORDER — FAMOTIDINE 20 MG/1
20 TABLET, FILM COATED ORAL ONCE
Status: COMPLETED | OUTPATIENT
Start: 2025-02-12 | End: 2025-02-12

## 2025-02-12 RX ORDER — FAMOTIDINE 10 MG/ML
20 INJECTION, SOLUTION INTRAVENOUS ONCE
Status: COMPLETED | OUTPATIENT
Start: 2025-02-12 | End: 2025-02-12

## 2025-02-12 RX ORDER — ROPIVACAINE HYDROCHLORIDE 5 MG/ML
INJECTION, SOLUTION EPIDURAL; INFILTRATION; PERINEURAL AS NEEDED
Status: DISCONTINUED | OUTPATIENT
Start: 2025-02-12 | End: 2025-02-12 | Stop reason: SURG

## 2025-02-12 RX ORDER — TRANEXAMIC ACID 10 MG/ML
1000 INJECTION, SOLUTION INTRAVENOUS ONCE
Status: DISCONTINUED | OUTPATIENT
Start: 2025-02-12 | End: 2025-02-12

## 2025-02-12 RX ORDER — METOCLOPRAMIDE 10 MG/1
10 TABLET ORAL ONCE
Status: COMPLETED | OUTPATIENT
Start: 2025-02-12 | End: 2025-02-12

## 2025-02-12 RX ORDER — SODIUM CHLORIDE, SODIUM LACTATE, POTASSIUM CHLORIDE, CALCIUM CHLORIDE 600; 310; 30; 20 MG/100ML; MG/100ML; MG/100ML; MG/100ML
INJECTION, SOLUTION INTRAVENOUS CONTINUOUS
Status: DISCONTINUED | OUTPATIENT
Start: 2025-02-12 | End: 2025-02-12

## 2025-02-12 RX ORDER — NALOXONE HYDROCHLORIDE 0.4 MG/ML
0.08 INJECTION, SOLUTION INTRAMUSCULAR; INTRAVENOUS; SUBCUTANEOUS AS NEEDED
Status: DISCONTINUED | OUTPATIENT
Start: 2025-02-12 | End: 2025-02-12

## 2025-02-12 RX ORDER — ACETAMINOPHEN 500 MG
1000 TABLET ORAL ONCE
Status: COMPLETED | OUTPATIENT
Start: 2025-02-12 | End: 2025-02-12

## 2025-02-12 RX ORDER — ONDANSETRON 2 MG/ML
4 INJECTION INTRAMUSCULAR; INTRAVENOUS EVERY 6 HOURS PRN
Status: DISCONTINUED | OUTPATIENT
Start: 2025-02-12 | End: 2025-02-12

## 2025-02-12 RX ORDER — PHENYLEPHRINE HCL 10 MG/ML
VIAL (ML) INJECTION AS NEEDED
Status: DISCONTINUED | OUTPATIENT
Start: 2025-02-12 | End: 2025-02-12 | Stop reason: SURG

## 2025-02-12 RX ADMIN — ROPIVACAINE HYDROCHLORIDE 30 ML: 5 INJECTION, SOLUTION EPIDURAL; INFILTRATION; PERINEURAL at 08:42:00

## 2025-02-12 RX ADMIN — TRANEXAMIC ACID 1000 MG: 10 INJECTION, SOLUTION INTRAVENOUS at 13:08:00

## 2025-02-12 RX ADMIN — SODIUM CHLORIDE, SODIUM LACTATE, POTASSIUM CHLORIDE, CALCIUM CHLORIDE: 600; 310; 30; 20 INJECTION, SOLUTION INTRAVENOUS at 14:01:00

## 2025-02-12 RX ADMIN — PHENYLEPHRINE HCL 100 MCG: 10 MG/ML VIAL (ML) INJECTION at 10:56:00

## 2025-02-12 RX ADMIN — TRANEXAMIC ACID 1000 MG: 10 INJECTION, SOLUTION INTRAVENOUS at 10:30:00

## 2025-02-12 RX ADMIN — LIDOCAINE HYDROCHLORIDE 50 MG: 10 INJECTION, SOLUTION EPIDURAL; INFILTRATION; INTRACAUDAL; PERINEURAL at 10:25:00

## 2025-02-12 RX ADMIN — LIDOCAINE HYDROCHLORIDE 1 ML: 10 INJECTION, SOLUTION INFILTRATION; PERINEURAL at 08:37:00

## 2025-02-12 RX ADMIN — SODIUM CHLORIDE, SODIUM LACTATE, POTASSIUM CHLORIDE, CALCIUM CHLORIDE: 600; 310; 30; 20 INJECTION, SOLUTION INTRAVENOUS at 10:19:00

## 2025-02-12 RX ADMIN — SODIUM CHLORIDE, SODIUM LACTATE, POTASSIUM CHLORIDE, CALCIUM CHLORIDE: 600; 310; 30; 20 INJECTION, SOLUTION INTRAVENOUS at 11:44:00

## 2025-02-12 NOTE — OPERATIVE REPORT
DATE OF PROCEDURE: 02/12/25     SURGEON:  Malcom Gputa M.D       PREOPERATIVE DIAGNOSIS:     Left closed Both bone Forearm Fracture      POSTOPERATIVE DIAGNOSIS:     Left closed Both Bone Forearm Fracture      PROCEDURE PERFORMED:       1. 01518 Open treatment of radial AND ulnar shaft fractures, with internal  fixation, when performed; of radius AND ulna -22    2. Closed Reduction of distal radial ulnar joint     Implants:     Synthes     Lc-dcp 3.5 mm 6 hole small frag plate.   Lc-dcp 2.0 mm 6 hole mini frag plate        ANESTHESIA: regional + sedation.      ESTIMATED BLOOD LOSS: 50cc.      TOURNIQUET TIME:  0 minutes.      COMPLICATIONS: None.        COMPLEX PROCEDURE:    There was an altered surgical field. There was abnormal anatomy. There was major scarring to the area due to the significant retraction and overlap of the fracture, making the reduction complex, pulling the fracture out to length. Complexity of the service was much greater than the normative procedure. There was increased time, intensity and technical difficulty of the procedure, severity of the patient's condition and mental effort required.         BRIEF INDICATION OF MEDICAL NECESSITY:     The patient is a 74 y.o. year-old male  who was seen in the clinic with a history with a history and physical examination findings consistent with the above. Patient had a mechanical fall and had immediate pain. Imaging consistent with a comminuted both bone forearm fracture.Non-operative versus operative options were discussed. The risks and benefits were discussed with the patient. The patient acknowledged understanding and wished to proceed with operative intervention. Informed consent was obtained prior to the procedure. Details of the surgical procedure were explained, including incisions and probable rehabilitation course. The patient understands the likely length of convalescence after surgery; and we have explained the risks, benefits, and  alternatives of surgery. Reasonable expectations and potential complications were discussed and acknowledged, including but not limited to infection, bleeding, blood clots, (DVT and/or PE), nerve injury, retear, instability, hardware complications, fracture, continued pain and stiffness, cardiopulmonary compromise and anesthetic complications. Also, risk of heterotopic ossification and neuropraxia were also explained. . It was also explained that there was a chance of failure which may require further management and procedure(s). The patient agreed and understood and wished to proceed.        PROCEDURE IN DETAIL: The correct operative site was marked and consents were verified, and patient was taken to the Operating Room and placed supine on the operating table. General endotracheal anesthesia was then applied via the Anesthesia Team. All pressure points were well padded. A tourniquet was placed high on the operative arm. The operative upper extremity was prepped and draped in the usual sterile fashion.     Appropriate landmarks were noticed on the skin. A 10 cm longitudinal  incision was made from the FCR insertion moving proximally inline with the medial border of the brachioradialis.      Subcutaneous tissue was dissected with full thicken flaps radially and laterally. We proceeded to identify the BR And FCR interval. Care was taken to identify the Radial artery and Superficial branch of the radial nerve these were mobilized ulnar and radially respectively. Careful cauterization was obtained between numerus perforators between the radial artery and BR..     Deep dissection proceeded distally with elevation partially of the FPL at the radial border at the fracture site. The FPL  muscle belly was elevated cleanly off the volar surface of the radial shaft. The fracture appeared to be shortened by 3cm with significant comminution.  The fracture site ends were cleaned of any interpositional tissue any callus.  Large  butterfly fragment was reduced and lagged into position. The two major fragments were then was then anatomically reduced with excellent keys over the volar surface.  A mini-fragment plate then place for additional provisional fixation. We proceed to size a 7 hole 3.5mm LC-DCP plate.  This was placed centered on the radial shaft and 3 non-locking screws were placed on either side of the fracture with excellent purchase. Screws were placed eccentrically to gain additional compression at the fracture site. Intraoperative fluoroscopy was then obtained and plate position and screw lengths were reconfirmed to be adequate and without prominence.     We then proceeded to address the ulnar shaft fracture  Incision was made overlying the ulnar border overlying the shaft. ECU/FCU interval fascia was incised and divided. Care was taken to protect the sensory nerve branches distally. The ECU was elevate epi-periosteal fashion.  Fracture was reduced and held provisionally with mini-frag plate. A 6 hole plate was then used for definitive fixation. This was placed centered on the shaft and 3 non-locking screws were placed on either side of the fracture with excellent purchase. Screws were placed eccentrically to gain additional compression at the fracture site. Intraoperative fluoroscopy was then obtained and plate position and screw lengths were reconfirmed to be adequate and without prominence.     DRUJ was then stressed and evaluated radiographically . This was shown to be fully reduced in supination or pronation..     The incision site was then copiously irrigated again,  Excellent hemostasis was obtained without any evidence of pulsatile or arterial bleeding.  Subcutaneous tissue was then closed using 3-0 Vicryl interrupted suture. Monocryl 4-0 was then used for the subcuticular layer, finished with tissue glue.      Waterproof dressing was placed, followed by cast padding and a volar resting splint was placed with an ace  wrap     The patient was then awoken from anesthesia and transported to the Recovery Room in stable condition, with compartments soft and capillary refill less than a second in all digits.          POSTOPERATIVE CARE:     I updated the family while the patient recovered in the PACU.     Splint week 1-2   Week 2-6 :Convert to muenster splint - Okay to start therapy.

## 2025-02-12 NOTE — BRIEF OP NOTE
Pre-Operative Diagnosis: Fracture, forearm NOS, closed left     Post-Operative Diagnosis: Fracture, forearm NOS, closed left      Procedure Performed:   Open reduction internal fixation of left radius and ulna, removal of staples    Surgeons and Role:     * Malcom Gupta MD - Primary    Assistant(s):  Surgical Assistant.: Isaura Duenas     Surgical Findings: expected, comminuted bb fa fx      Specimen: none     Estimated Blood Loss: Blood Output: 50 mL (2/12/2025  1:55 PM)      Dictation Number:  see full op report .     Malcom Gupta MD  2/12/2025  2:05 PM

## 2025-02-12 NOTE — TELEPHONE ENCOUNTER
Received fax from [employer] stating patient has applied for disability benefits with signed JOSE requesting records. Request faxed to medical records.

## 2025-02-12 NOTE — ANESTHESIA PREPROCEDURE EVALUATION
Anesthesia PreOp Note    HPI:     Enmanuel Lara is a 74 year old male who presents for preoperative consultation requested by: Malcom Gupta MD    Date of Surgery: 2/12/2025    Procedure(s):  Open reduction internal fixation of left radius and ulna  Indication: Fracture, forearm NOS, closed left    Relevant Problems   No relevant active problems       NPO:  Last Liquid Consumption Date: 02/11/25  Last Liquid Consumption Time: 2000  Last Solid Consumption Date: 02/11/25  Last Solid Consumption Time: 2000  Last Liquid Consumption Date: 02/11/25          History Review:  Patient Active Problem List    Diagnosis Date Noted    Preop examination 02/10/2025    Fall 02/10/2025    Leukocytosis 02/05/2025    Acute subdural hematoma (HCC) 02/05/2025    Head trauma, initial encounter 02/05/2025    Confusion 02/05/2025    Acute post-traumatic headache, not intractable 02/05/2025    Former smoker 12/17/2024    Acute on chronic respiratory failure with hypoxemia (HCC) 12/17/2024    COPD exacerbation (HCC) 12/04/2024    Degenerative spondylolisthesis 05/22/2023    Chronic bilateral low back pain with left-sided sciatica 05/22/2023    Prediabetes 10/17/2022    Spinal stenosis of lumbar region with radiculopathy 10/04/2021    Atherosclerosis of aorta 06/14/2021    Panlobular emphysema (HCC) 01/21/2020    Class 1 obesity due to excess calories without serious comorbidity with body mass index (BMI) of 31.0 to 31.9 in adult 01/29/2019    left 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 08/20/2018    Internal hemorrhoids 11/08/2017    Essential hypertension with goal blood pressure less than 140/90 06/20/2017    Posterior capsule opacification 07/29/2014    Pseudophakia of both eyes 07/29/2014    Osteoarthrosis, shoulder region 03/18/2013    Mixed hyperlipidemia 05/01/2012    Impotence of organic origin 10/13/2010    Chronic airway obstruction (HCC) 11/17/2008    Rosacea 10/27/2008       Past Medical History:    Cataract    COPD  (chronic obstructive pulmonary disease) (HCC)    Diverticulosis large intestine w/o perforation or abscess w/o bleeding    High blood pressure    High cholesterol    History of tonsillitis    Tonsillectomy; per NG    Internal hemorrhoids    MGD (meibomian gland dysfunction)    OU; per NG    Obesity, unspecified    per NG    Other and unspecified hyperlipidemia    per NG    Overweight (BMI 25.0-29.9)    Pulmonary emphysema (HCC)    PVD (peripheral vascular disease)    per NG    S/P colonoscopic polypectomy    Synovial cyst of lumbar spine       Past Surgical History:   Procedure Laterality Date    Cataract extraction w/  intraocular lens implant Left 02/14/2011    Phaco w/ PC IOL; per NG    Cataract extraction w/  intraocular lens implant Right 11/18/2013    Phaco w/ PC IOL; per NG    Colonoscopy  11/01/2012    repeat in 2017 fall    Colonoscopy N/A 11/08/2017    Procedure: COLONOSCOPY;  Surgeon: Abbie Murphy MD;  Location: [facility] ENDO    Colonoscopy N/A 07/27/2021    Procedure: COLONOSCOPY;  Surgeon: Estiven Alfonso MD;  Location: [facility] ENDOSCOPY    Colonoscopy N/A 08/13/2024    Procedure: COLONOSCOPY;  Surgeon: Estiven Alfonso MD;  Location: [facility] ENDOSCOPY    Electrocardiogram, complete  10/22/2013    SCANNED TO MEDIA TAB: 10-    Rotator cuff repair Bilateral     Spine surgery procedure unlisted      Tonsillectomy      per NG       Prescriptions Prior to Admission[1]  Current Medications and Prescriptions Ordered in Epic[2]    Allergies[3]    Family History   Problem Relation Age of Onset    Cancer Father 53        Lung cancer, Cause of death; per NG    Dementia Mother         per NG    Neurological Disorder Mother 81        Alzheimer's disease, Cause of death; per NG    Diabetes Neg     Glaucoma Neg     Macular degeneration Neg     Retinal detachment Neg     Lipids Neg      Social History     Socioeconomic History    Marital status:    Tobacco Use    Smoking status: Former      Current packs/day: 0.00     Average packs/day: 1 pack/day for 35.0 years (35.0 ttl pk-yrs)     Types: Cigarettes     Start date:      Quit date:      Years since quittin.1     Passive exposure: Past    Smokeless tobacco: Former    Tobacco comments:     Quit cigs in , less than once a month(cigs)   Vaping Use    Vaping status: Never Used   Substance and Sexual Activity    Alcohol use: Yes     Alcohol/week: 0.0 standard drinks of alcohol     Comment: 1-2 drinks     Drug use: No   Other Topics Concern    Caffeine Concern Yes     Comment: Soda, QOD; per NG    Exercise No       Available pre-op labs reviewed.  Lab Results   Component Value Date    WBC 11.9 (H) 02/10/2025    RBC 4.26 02/10/2025    HGB 13.9 02/10/2025    HCT 39.7 02/10/2025    MCV 93.2 02/10/2025    MCH 32.6 02/10/2025    MCHC 35.0 02/10/2025    RDW 14.0 02/10/2025    .0 02/10/2025     Lab Results   Component Value Date     2025    K 4.2 2025     2025    CO2 30.0 2025    BUN 17 2025    CREATSERUM 0.96 2025     (H) 2025    PGLU 93 2024    CA 8.7 2025          Vital Signs:  Body mass index is 31.32 kg/m².   height is 1.727 m (5' 8\") and weight is 93.4 kg (206 lb). His oral temperature is 98.6 °F (37 °C). His blood pressure is 125/68 and his pulse is 92. His respiration is 21 and oxygen saturation is 93%.   Vitals:    25 0915 25 0748 25 0821   BP:  113/47 125/68   Pulse:  84 92   Resp:  18 21   Temp:  98.6 °F (37 °C)    TempSrc:  Oral    SpO2:  94% 93%   Weight: 94.3 kg (208 lb) 93.4 kg (206 lb)    Height: 1.727 m (5' 8\") 1.727 m (5' 8\")         Anesthesia Evaluation      Airway   Mallampati: II  TM distance: >3 FB  Neck ROM: full  Dental - Dentition appears grossly intact     Pulmonary - normal exam   (+) COPD  Cardiovascular - normal exam  (+) hypertension    Neuro/Psych    (+)  neuromuscular disease,        GI/Hepatic/Renal      Endo/Other     Abdominal   (-) obese                 Anesthesia Plan:   ASA:  3  Plan:   Regional and MAC  Post-op Pain Management: Supraclavicular block  Plan Comments: LMA as backup  Informed Consent Plan and Risks Discussed With:  Patient  Consent Comment: I have discussed the anesthetic plan, major risks and alternatives with the patient and answered all questions.  Minor and major side effects were discussed with patient, including but not limited to: injury to teeth/gums/lips, aspiration, nausea/vomiting postoperatively, anaphylaxis, heart attack, stroke, post-operative ventilation and death. The patient desires to proceed with surgery and anesthesia as planned. All questions answered.   Discussed plan with:  CRNA      I have informed Enmanuel Lara and/or legal guardian or family member of the nature of the anesthetic plan, benefits, risks including possible dental damage if relevant, major complications, and any alternative forms of anesthetic management.   All of the patient's questions were answered to the best of my ability. The patient desires the anesthetic management as planned.  Kaushik Landaverde DO  2/12/2025 8:25 AM  Present on Admission:  **None**           [1]   Medications Prior to Admission   Medication Sig Dispense Refill Last Dose/Taking    traMADol 50 MG Oral Tab Take 1-2 tablets ( mg total) by mouth every 8 (eight) hours as needed for Pain. 30 tablet 0 2/11/2025 Noon    umeclidinium-vilanterol 62.5-25 MCG/ACT Inhalation Aerosol Powder, Breath Activated Inhale 1 puff into the lungs daily. 1 each 11 2/11/2025 Morning    lisinopril 10 MG Oral Tab Take 1 tablet (10 mg total) by mouth daily. 90 tablet 3 2/11/2025 Morning    simvastatin 20 MG Oral Tab Take 1 tablet (20 mg total) by mouth every evening. 90 tablet 3 2/11/2025 Evening    celecoxib 200 MG Oral Cap Take 1 capsule (200 mg total) by mouth 2 (two) times daily with meals. 180 capsule 1 Past Week    Ascorbic Acid (VITAMIN C) 1000 MG Oral Tab Take 1 tablet  (1,000 mg total) by mouth daily.   2/11/2025 Evening    ALBUTEROL 108 (90 Base) MCG/ACT Inhalation Aero Soln Inhale 2 puffs into the lungs every 4 (four) hours as needed for Wheezing or Shortness of Breath. 8.5 g 0 Unknown   [2]   Current Facility-Administered Medications Ordered in Epic   Medication Dose Route Frequency Provider Last Rate Last Admin    lactated ringers infusion   Intravenous Continuous Malcom Gupta MD        ceFAZolin (Ancef) 2g in 10mL IV syringe premix  2 g Intravenous Once Malcom Gupta MD        tranexamic acid in sodium chloride 0.7% (Cyklokapron) 1000 mg/100mL infusion premix 1,000 mg  1,000 mg Intravenous Once Malcom Gupta MD        tranexamic acid in sodium chloride 0.7% (Cyklokapron) 1000 mg/100mL infusion premix 1,000 mg  1,000 mg Intravenous Once Malcom Gupta MD         Current Outpatient Medications Ordered in Epic   Medication Sig Dispense Refill    celecoxib 200 MG Oral Cap Take 1 capsule (200 mg total) by mouth 2 (two) times daily. 30 capsule 1    gabapentin 100 MG Oral Cap Take 1 capsule (100 mg total) by mouth 3 (three) times daily. 42 capsule 1    HYDROcodone-acetaminophen 5-325 MG Oral Tab Take 1-2 tablets by mouth every 6 (six) hours as needed for Pain. 24 tablet 0    acetaminophen 500 MG Oral Tab Take 2 tablets (1,000 mg total) by mouth every 8 (eight) hours as needed for Pain. 60 tablet 0    cyclobenzaprine 5 MG Oral Tab Take 1 tablet (5 mg total) by mouth 3 (three) times daily as needed for Muscle spasms. 21 tablet 0   [3] No Known Allergies

## 2025-02-12 NOTE — PROGRESS NOTES
Patients family bel (son) was advised for a family member to stay with the patient for the first 24 hours after surgery. This RN helped patient to the bathroom in a wheelchair, in phase 2 of recovery. Patient was able to stand with stand by assist. Patients son bel was made aware of patients unsteadiness. Patient denies using any assistive devices at home, was advised to use something at home for ambulation  Patients family verbalized understanding   Patient was discharged at this time in stable condition.

## 2025-02-12 NOTE — ANESTHESIA POSTPROCEDURE EVALUATION
Patient: Enmanuel Lara    Procedure Summary       Date: 02/12/25 Room / Location: [facility] MAIN OR 02 / EM MAIN OR    Anesthesia Start: 1019 Anesthesia Stop:     Procedure: Open reduction internal fixation of left radius and ulna, removal of staples (Left: Lower Arm) Diagnosis: (Fracture, forearm NOS, closed left)    Surgeons: Malcom Gupta MD Anesthesiologist: Kaushik Landaverde DO    Anesthesia Type: regional, MAC ASA Status: 3            Anesthesia Type: regional, MAC    Vitals Value Taken Time   /58 02/12/25 1401   Temp 97.7 °F (36.5 °C) 02/12/25 1401   Pulse 83 02/12/25 1401   Resp 23 02/12/25 1401   SpO2 93 % 02/12/25 1401   Vitals shown include unfiled device data.    [facility] AN Post Evaluation:   Patient Evaluated in PACU  Patient Participation: complete - patient participated  Level of Consciousness: sleepy but conscious  Pain Management: adequate  Airway Patency:patent  Yes    Nausea/Vomiting: none  Cardiovascular Status: acceptable  Respiratory Status: acceptable and nasal cannula  Postoperative Hydration acceptable      Abbie Cristina CRNA  2/12/2025 2:02 PM

## 2025-03-03 ENCOUNTER — HOSPITAL ENCOUNTER (OUTPATIENT)
Dept: CT IMAGING | Age: 75
Discharge: HOME | End: 2025-03-03
Attending: STUDENT IN AN ORGANIZED HEALTH CARE EDUCATION/TRAINING PROGRAM
Payer: MEDICARE

## 2025-03-03 DIAGNOSIS — G44.319 ACUTE POST-TRAUMATIC HEADACHE, NOT INTRACTABLE: ICD-10-CM

## 2025-03-03 DIAGNOSIS — S06.5XAA ACUTE SUBDURAL HEMATOMA (HCC): ICD-10-CM

## 2025-03-03 PROCEDURE — 70450 CT HEAD/BRAIN W/O DYE: CPT | Performed by: STUDENT IN AN ORGANIZED HEALTH CARE EDUCATION/TRAINING PROGRAM

## 2025-03-20 ENCOUNTER — TELEPHONE (OUTPATIENT)
Dept: PULMONOLOGY | Facility: CLINIC | Age: 75
End: 2025-03-20

## 2025-03-20 NOTE — TELEPHONE ENCOUNTER
Per [employer] return receipt, certified letter regarding overdue CT lung low dose screening was delivered on 3/7/25.

## 2025-04-02 ENCOUNTER — PATIENT OUTREACH (OUTPATIENT)
Dept: CASE MANAGEMENT | Age: 75
End: 2025-04-02

## 2025-04-02 DIAGNOSIS — M43.10 DEGENERATIVE SPONDYLOLISTHESIS: ICD-10-CM

## 2025-04-02 DIAGNOSIS — R73.03 PREDIABETES: ICD-10-CM

## 2025-04-02 DIAGNOSIS — J44.1 COPD EXACERBATION (HCC): ICD-10-CM

## 2025-04-02 DIAGNOSIS — I10 ESSENTIAL HYPERTENSION WITH GOAL BLOOD PRESSURE LESS THAN 140/90: Primary | ICD-10-CM

## 2025-04-02 DIAGNOSIS — M48.061 SPINAL STENOSIS OF LUMBAR REGION WITH RADICULOPATHY: ICD-10-CM

## 2025-04-02 DIAGNOSIS — M54.16 SPINAL STENOSIS OF LUMBAR REGION WITH RADICULOPATHY: ICD-10-CM

## 2025-04-02 NOTE — PROGRESS NOTES
Spoke to Enmanuel for CCM.      Updates to patient care team/comments: Dr. Gonzalez added to care team.    Patient reported changes in medications: None     Med Adherence  Comment: Pt confirmed he is taking medications as instructed by their providers.     Health Maintenance:   Health Maintenance   Topic Date Due    Zoster Vaccines (2 of 3) 11/30/2015    Annual Well Visit  01/01/2025 Scheduled    Lung Cancer Screening  05/14/2025 (Originally 12/14/2003)    COVID-19 Vaccine (5 - 2024-25 season) 10/27/2025 (Originally 9/1/2024)    Influenza Vaccine (Season Ended) 10/01/2025    Colorectal Cancer Screening  08/13/2027    Annual Depression Screening  Completed    Fall Risk Screening (Annual)  Completed    Pneumococcal Vaccine: 50+ Years  Completed    Meningococcal B Vaccine  Aged Out     Patient updates/concerns:     The patient reports that his left arm is healing well and states that it is coming along. Providers have informed him that it is improving faster than expected. He has full range of motion and continues PT once a week for strengthening his arm. With four sessions remaining.    Back pain remains an ongoing issue. He states that while he can walk for extended periods, standing in placed causes discomfort. He has decided to focus on quality of life and pain management rather than seeking complete resolution.    He mentioned that he is scheduled for a routine lung screening  tomorrow and feels somewhat nervous about the results.    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    Goals/Action Plan:    Active goal from previous outreach:   Continue with  PT exercises and lose 5 lbs get down to 198 lbs          Where/when/how: By eating healthier, cutting back  Patient reported progress towards goals:                - What: The patient reports he is making progress.           - Where/When/How: He is down two pounds. States he is trying to be mindful of his portions and is  trying to walk 3,000 steps daily.  Patient Reported Barriers and Concerns: He is worried about his recent lung scan.                    - Plan for overcoming barriers: He will try to remain calm and will have his son go with him to the appointment for support.    Care Managers Interventions:     Nutrition and exercise counseling: I encouraged the patient to consume three balanced meals daily and discussed the importance of incorporating 20-30 minutes of daily exercise, including gentle stretches. To improve overall health.    Chat review and documentation: I thoroughly reviewed the patient chart and updated Care Everywhere.    Immunization Status: I conducted an [employer] immunization query, which confirmed that no updates were necessary at this time.     Patient support: I actively listened to the patient's concern's, provided emotional support and encouraged the patient to reach out, if  he needs further assistance.     Reviewed Future Appointments:   Future Appointments   Date Time Provider Department Center   4/3/2025 10:30 AM [facility] CT RM2 [facility] CT SCAN EM [facility]   4/14/2025  5:00 PM Betito Garcia MD [facility] Care Manager Follow Up Date: One month. Encouraged patient to call as needed.    Reason For Follow Up: review progress and or barriers towards patient's goals.     Time Spent This Encounter Total: 26 min medical record review, telephone communication, care plan updates where needed, education, goals, and action plan recreation/update. Provided acknowledgment and validation to patient's concerns.   Monthly Minute Total including today: 26 min  Physical assessment, complete health history, and need for CCM established by Betito Garcia MD.  Friendly reminder - 2025 Benefits Open Enrollment is here!   We encourage you to review your current Medicare coverage and explore your options during this period. We have developed a Medicare Information Page on our website to serve as a resource for guidance  and answers to any questions you might have.   You can access it by visiting, [email or web address]

## 2025-04-03 ENCOUNTER — HOSPITAL ENCOUNTER (OUTPATIENT)
Dept: CT IMAGING | Facility: HOSPITAL | Age: 75
Discharge: HOME | End: 2025-04-03
Attending: INTERNAL MEDICINE
Payer: MEDICARE

## 2025-04-03 DIAGNOSIS — Z87.891 PERSONAL HISTORY OF TOBACCO USE, PRESENTING HAZARDS TO HEALTH: ICD-10-CM

## 2025-04-03 PROCEDURE — 71271 CT THORAX LUNG CANCER SCR C-: CPT | Performed by: INTERNAL MEDICINE

## 2025-04-11 ENCOUNTER — TELEPHONE (OUTPATIENT)
Dept: INTERNAL MEDICINE CLINIC | Facility: CLINIC | Age: 75
End: 2025-04-11

## 2025-04-14 ENCOUNTER — OFFICE VISIT (OUTPATIENT)
Dept: INTERNAL MEDICINE CLINIC | Facility: CLINIC | Age: 75
End: 2025-04-14

## 2025-04-14 VITALS
SYSTOLIC BLOOD PRESSURE: 124 MMHG | OXYGEN SATURATION: 97 % | DIASTOLIC BLOOD PRESSURE: 76 MMHG | HEART RATE: 85 BPM | BODY MASS INDEX: 31.37 KG/M2 | HEIGHT: 68 IN | TEMPERATURE: 98 F | WEIGHT: 207 LBS

## 2025-04-14 DIAGNOSIS — E66.811 CLASS 1 OBESITY DUE TO EXCESS CALORIES WITHOUT SERIOUS COMORBIDITY WITH BODY MASS INDEX (BMI) OF 31.0 TO 31.9 IN ADULT: ICD-10-CM

## 2025-04-14 DIAGNOSIS — I10 ESSENTIAL HYPERTENSION WITH GOAL BLOOD PRESSURE LESS THAN 140/90: ICD-10-CM

## 2025-04-14 DIAGNOSIS — E66.09 CLASS 1 OBESITY DUE TO EXCESS CALORIES WITHOUT SERIOUS COMORBIDITY WITH BODY MASS INDEX (BMI) OF 31.0 TO 31.9 IN ADULT: ICD-10-CM

## 2025-04-14 DIAGNOSIS — M43.10 DEGENERATIVE SPONDYLOLISTHESIS: ICD-10-CM

## 2025-04-14 DIAGNOSIS — M48.061 SPINAL STENOSIS OF LUMBAR REGION WITH RADICULOPATHY: ICD-10-CM

## 2025-04-14 DIAGNOSIS — J44.9 CHRONIC OBSTRUCTIVE PULMONARY DISEASE, UNSPECIFIED COPD TYPE (HCC): Primary | ICD-10-CM

## 2025-04-14 DIAGNOSIS — M71.38 SYNOVIAL CYST OF LUMBAR SPINE: ICD-10-CM

## 2025-04-14 DIAGNOSIS — M54.16 SPINAL STENOSIS OF LUMBAR REGION WITH RADICULOPATHY: ICD-10-CM

## 2025-04-14 DIAGNOSIS — E78.2 MIXED HYPERLIPIDEMIA: ICD-10-CM

## 2025-04-14 PROBLEM — J44.1 COPD EXACERBATION (HCC): Status: RESOLVED | Noted: 2024-12-04 | Resolved: 2025-04-14

## 2025-04-14 PROBLEM — G89.29 CHRONIC BILATERAL LOW BACK PAIN WITH LEFT-SIDED SCIATICA: Status: RESOLVED | Noted: 2023-05-22 | Resolved: 2025-04-14

## 2025-04-14 PROBLEM — S09.90XA HEAD TRAUMA, INITIAL ENCOUNTER: Status: RESOLVED | Noted: 2025-02-05 | Resolved: 2025-04-14

## 2025-04-14 PROBLEM — R41.0 CONFUSION: Status: RESOLVED | Noted: 2025-02-05 | Resolved: 2025-04-14

## 2025-04-14 PROBLEM — M54.42 CHRONIC BILATERAL LOW BACK PAIN WITH LEFT-SIDED SCIATICA: Status: RESOLVED | Noted: 2023-05-22 | Resolved: 2025-04-14

## 2025-04-14 PROBLEM — J43.1 PANLOBULAR EMPHYSEMA (HCC): Status: RESOLVED | Noted: 2020-01-21 | Resolved: 2025-04-14

## 2025-04-14 PROBLEM — I70.0 ATHEROSCLEROSIS OF AORTA: Status: RESOLVED | Noted: 2021-06-14 | Resolved: 2025-04-14

## 2025-04-14 PROBLEM — G44.319 ACUTE POST-TRAUMATIC HEADACHE, NOT INTRACTABLE: Status: RESOLVED | Noted: 2025-02-05 | Resolved: 2025-04-14

## 2025-04-14 PROBLEM — J96.21 ACUTE ON CHRONIC RESPIRATORY FAILURE WITH HYPOXEMIA (HCC): Status: RESOLVED | Noted: 2024-12-17 | Resolved: 2025-04-14
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
## 2025-04-14 PROBLEM — Z01.818 PREOP EXAMINATION: Status: RESOLVED | Noted: 2025-02-10 | Resolved: 2025-04-14

## 2025-04-14 RX ORDER — CELECOXIB 200 MG/1
200 CAPSULE ORAL DAILY
Qty: 90 CAPSULE | Refills: 1 | Status: SHIPPED | OUTPATIENT
Start: 2025-04-14

## 2025-04-14 NOTE — TELEPHONE ENCOUNTER
COMPREHENSIVE MEDICATION REVIEW         Enmanuel Lara MRN [id number]    1950 PCP Betito Garcia MD     Comments: Medication history completed by Ambulatory Clinic Pharmacist over the phone on 25. Patient has upcoming AWV with PCP on 25.     After thorough medication review, 5 discrepancies have been identified and corrected on patient's medication list. See updated list below:     Outpatient Encounter Medications as of 2025   Medication Sig    Tiotropium Bromide-Olodaterol 2.5-2.5 MCG/ACT Inhalation Aero Soln Inhale 2 puffs into the lungs daily.    celecoxib 200 MG Oral Cap Take 1 capsule (200 mg total) by mouth 2 (two) times daily.    acetaminophen 500 MG Oral Tab Take 2 tablets (1,000 mg total) by mouth every 8 (eight) hours as needed for Pain.    lisinopril 10 MG Oral Tab Take 1 tablet (10 mg total) by mouth daily.    simvastatin 20 MG Oral Tab Take 1 tablet (20 mg total) by mouth every evening.    ALBUTEROL 108 (90 Base) MCG/ACT Inhalation Aero Soln Inhale 2 puffs into the lungs every 4 (four) hours as needed for Wheezing or Shortness of Breath.    Ascorbic Acid (VITAMIN C) 1000 MG Oral Tab Take 1 tablet (1,000 mg total) by mouth daily.     Medication Assessment:   Reviewed all medications in detail with patient including dose, indication, timing of administration, monitoring parameters, and potential side effects of medications.     Patient reports taking lisinopril 10 mg daily and simvastatin 20 mg nightly as prescribed. He does not usually monitor his blood pressure at home. Did recommend patient monitor his blood pressure 2-3 times weekly and bring readings in to all MD appointments for review.     Did provide education and stressed the importance of taking medication just like prescribed to get the most benefit. Patient denies forgetting or missing medication doses. Patient is requesting a refill on celecoxib be sent to local pharmacy; will pend for PCP review.     Thank you,    Claire  Yvette Herzog, 4/14/2025, 11:26 AM

## 2025-04-14 NOTE — PROGRESS NOTES
Subjective:   Enmanuel Lara is a 74 year old male who presents for a MA AHA (Medicare Advantage Annual Health Assessment) and Subsequent Annual Wellness visit (Pt already had Initial Annual Wellness) and scheduled follow up of multiple significant but stable problems.   History of Present Illness    74-year-old gentleman here for Medicare annual wellness visit.  He reports that he is doing well.  He had a mechanical fall that resulted in fracture of his forearm.  He is doing well after the surgery.  No abuse no depression reported.    Fall prevention discussed  History/Other:   Fall Risk Assessment:   He has been screened for Falls and is High Risk. Fall Prevention information provided to patient in After Visit Summary.    Do you feel unsteady when standing or walking?: No  Do you worry about falling?: Yes  Have you fallen in the past year?: Yes  How many times have you fallen?: 2  Were you injured?: Yes     Cognitive Assessment:   He had a completely normal cognitive assessment - see flowsheet entries     Functional Ability/Status:   Enmanuel Lara has a completely normal functional assessment. See flowsheet for details.      Depression Screening (PHQ):  PHQ-2 SCORE: 0  , done 4/14/2025        <5 minutes spent screening and counseling for depression    Advanced Directives:   He does have a Living Will but we do NOT have it on file in Epic.    He does have a POA but we do NOT have it on file in Epic.    Discussed Advance Care Planning with patient (and family/surrogate if present). Standard forms made available to patient in After Visit Summary.      Problem List[1]  Allergies:  He has no known allergies.    Current Medications:  Active Meds, Sig Only[2]    Medical History:  He  has a past medical history of Cataract, COPD (chronic obstructive pulmonary disease) ([facility]), Diverticulosis large intestine w/o perforation or abscess w/o bleeding (11/08/2017), High blood pressure, High cholesterol, History of tonsillitis, Internal  hemorrhoids (11/08/2017), MGD (meibomian gland dysfunction) (2011), Obesity, unspecified, Other and unspecified hyperlipidemia, Overweight (BMI 25.0-29.9) (02/21/2017), Pulmonary emphysema (HCC), PVD (peripheral vascular disease), S/P colonoscopic polypectomy (11/08/2017), and Synovial cyst of lumbar spine (08/20/2018).  Surgical History:  He  has a past surgical history that includes Cataract extraction w/  intraocular lens implant (Left, 02/14/2011); Cataract extraction w/  intraocular lens implant (Right, 11/18/2013); electrocardiogram, complete (10/22/2013); tonsillectomy; colonoscopy (11/01/2012); colonoscopy (N/A, 11/08/2017); colonoscopy (N/A, 07/27/2021); spine surgery procedure unlisted; colonoscopy (N/A, 08/13/2024); and Rotator cuff repair (Bilateral).   Family History:  His family history includes Cancer (age of onset: 53) in his father; Dementia in his mother; Neurological Disorder (age of onset: 81) in his mother.  Social History:  He  reports that he quit smoking about 12 years ago. His smoking use included cigarettes. He started smoking about 47 years ago. He has a 35 pack-year smoking history. He has been exposed to tobacco smoke. He has quit using smokeless tobacco. He reports current alcohol use. He reports that he does not use drugs.    Tobacco:  He smoked tobacco in the past but quit greater than 12 months ago.  Tobacco Use[3]     CAGE Alcohol Screen:   CAGE screening score of 0 on 4/14/2025, showing low risk of alcohol abuse.      Patient Care Team:  Betito Garcia MD as PCP - General (Internal Medicine)  Fahad Montgomery MD (GASTROENTEROLOGY)  Vikash Owen MD (SURGERY, ORTHOPEDIC)  Miles Lehman APN-CNP as Nurse Practitioner (PAIN MANAGEMENT)  Rosanna Gregory CMA as CCM Care Manager  Gwen Gonzalez DO (PULMONARY DISEASES)  Malcom Gupta MD (SURGERY, ORTHOPEDIC)    Review of Systems   Constitutional:  Negative for activity change, appetite change and fever.   HENT:  Negative  for congestion and voice change.    Respiratory:  Negative for cough and shortness of breath.    Cardiovascular:  Negative for chest pain.   Gastrointestinal:  Negative for abdominal distention, abdominal pain and vomiting.   Genitourinary:  Negative for hematuria.   Musculoskeletal:  Positive for arthralgias and back pain.   Skin:  Negative for wound.   Psychiatric/Behavioral:  Negative for behavioral problems.          Objective:   Physical Exam  Constitutional:       Appearance: Normal appearance.   HENT:      Head: Normocephalic.   Eyes:      Conjunctiva/sclera: Conjunctivae normal.   Cardiovascular:      Rate and Rhythm: Normal rate and regular rhythm.      Heart sounds: Normal heart sounds. No murmur heard.  Pulmonary:      Effort: Pulmonary effort is normal.      Breath sounds: Normal breath sounds. No rhonchi or rales.   Abdominal:      General: Bowel sounds are normal.      Palpations: Abdomen is soft.      Tenderness: There is no abdominal tenderness.   Musculoskeletal:      Cervical back: Neck supple.      Right lower leg: No edema.      Left lower leg: No edema.   Skin:     General: Skin is warm and dry.   Neurological:      General: No focal deficit present.      Mental Status: He is alert and oriented to person, place, and time. Mental status is at baseline.   Psychiatric:         Mood and Affect: Mood normal.         Behavior: Behavior normal.         /76   Pulse 85   Temp 98 °F (36.7 °C) (Temporal)   Ht 5' 8\" (1.727 m)   Wt 207 lb (93.9 kg)   SpO2 97%   BMI 31.47 kg/m²  Estimated body mass index is 31.47 kg/m² as calculated from the following:    Height as of this encounter: 5' 8\" (1.727 m).    Weight as of this encounter: 207 lb (93.9 kg).    Medicare Hearing Assessment:   Hearing Screening    Time taken: 4/14/2025  5:10 PM  Screening Method: Questionnaire  I have a problem hearing over the telephone: No I have trouble following the conversations when two or more people are talking at  the same time: No   I have trouble understanding things on the TV: No I have to strain to understand conversations: No   I have to worry about missing the telephone ring or doorbell: No I have trouble hearing conversations in a noisy background such as a crowded room or restaurant: No   I get confused about where sounds come from: No I misunderstand some words in a sentence and need to ask people to repeat themselves: No   I especially have trouble understanding the speech of women and children: No I have trouble understanding the speaker in a large room such as at a meeting or place of Denominational: No   Many people I talk to seem to mumble (or don't speak clearly): No People get annoyed because I misunderstand what they say: No   I misunderstand what others are saying and make inappropriate responses: No I avoid social activities because I cannot hear well and fear I will reply improperly: No   Family members and friends have told me they think I may have hearing loss: No                   Assessment & Plan:   Enmanuel Lara is a 74 year old male who presents for a Medicare Assessment.     1. Chronic obstructive pulmonary disease, unspecified COPD type (HCC) (Primary) stable.  No flareup reported.  Continue to abstain from smoking  2. Mixed hyperlipidemia continue statins  -     Lipid Panel; Future; Expected date: 04/14/2025  -     TSH W Reflex To Free T4; Future; Expected date: 04/14/2025  -     Hepatic Function Panel (7); Future; Expected date: 04/14/2025  3. Essential hypertension with goal blood pressure less than 140/90-controlled  -     Lipid Panel; Future; Expected date: 04/14/2025  -     TSH W Reflex To Free T4; Future; Expected date: 04/14/2025  -     Hepatic Function Panel (7); Future; Expected date: 04/14/2025  4. Class 1 obesity due to excess calories without serious comorbidity with body mass index (BMI) of 31.0 to 31.9 in adult stable  5. Spinal stenosis of lumbar region with radiculopathy stable  6. 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 Degenerative spondylolisthesis  Overview:  S/p laminectomy at [facility]  - takes celebrex  Other orders  -     Celecoxib; Take 1 capsule (200 mg total) by mouth daily.  Dispense: 90 capsule; Refill: 1  Medication prescribed, labs were ordered.  Continue the surveillance low-dose CT.  Discussed regarding Shingrix vaccine.  I will see him back in 6 months  Assessment & Plan    The patient indicates understanding of these issues and agrees to the plan.        No follow-ups on file.     Betito Garcia MD, 4/14/2025     Supplementary Documentation:   General Health:  In the past six months, have you lost more than 10 pounds without trying?: 2 - No  Has your appetite been poor?: No  Type of Diet: Balanced  How does the patient maintain a good energy level?: Daily Walks  How would you describe your daily physical activity?: Moderate  How would you describe your current health state?: Good  How do you maintain positive mental well-being?: Social Interaction  On a scale of 0 to 10, with 0 being no pain and 10 being severe pain, what is your pain level?: 0 - (None)  In the past six months, have you experienced urine leakage?: 0-No  At any time do you feel concerned for the safety/well-being of yourself and/or your children, in your home or elsewhere?: No  Have you had any immunizations at another office such as Influenza, Hepatitis B, Tetanus, or Pneumococcal?: No    Health Maintenance   Topic Date Due    Zoster Vaccines (2 of 3) 11/30/2015    Annual Well Visit  01/01/2025    COVID-19 Vaccine (5 - 2024-25 season) 10/27/2025 (Originally 9/1/2024)    Influenza Vaccine (Season Ended) 10/01/2025    Lung Cancer Screening  04/03/2026    Colorectal Cancer Screening  08/13/2027    Annual Depression Screening  Completed    Fall Risk Screening (Annual)  Completed    Pneumococcal Vaccine: 50+ Years  Completed    Meningococcal B Vaccine  Aged Out            [1]   Patient Active Problem  List  Diagnosis    Posterior capsule opacification    Pseudophakia of both eyes    Chronic airway obstruction (HCC)    Impotence of organic origin    Mixed hyperlipidemia    Rosacea    Essential hypertension with goal blood pressure less than 140/90    Internal hemorrhoids    left 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    celecoxib 200 MG Oral Cap Take 1 capsule (200 mg total) by mouth daily.    acetaminophen 500 MG Oral Tab Take 2 tablets (1,000 mg total) by mouth every 8 (eight) hours as needed for Pain.    lisinopril 10 MG Oral Tab Take 1 tablet (10 mg total) by mouth daily.    simvastatin 20 MG Oral Tab Take 1 tablet (20 mg total) by mouth every evening.    ALBUTEROL 108 (90 Base) MCG/ACT Inhalation Aero Soln Inhale 2 puffs into the lungs every 4 (four) hours as needed for Wheezing or Shortness of Breath.    Ascorbic Acid (VITAMIN C) 1000 MG Oral Tab Take 1 tablet (1,000 mg total) by mouth daily.   [3]   Social History  Tobacco Use   Smoking Status Former    Current packs/day: 0.00    Average packs/day: 1 pack/day for 35.0 years (35.0 ttl pk-yrs)    Types: Cigarettes    Start date:     Quit date:     Years since quittin.2    Passive exposure: Past   Smokeless Tobacco Former   Tobacco Comments    Quit cigs in 2013, less than once a month(cigs)

## 2025-05-08 ENCOUNTER — LAB ENCOUNTER (OUTPATIENT)
Dept: LAB | Age: 75
End: 2025-05-08
Attending: INTERNAL MEDICINE
Payer: MEDICARE

## 2025-05-08 ENCOUNTER — PATIENT OUTREACH (OUTPATIENT)
Dept: CASE MANAGEMENT | Age: 75
End: 2025-05-08

## 2025-05-08 DIAGNOSIS — E78.2 MIXED HYPERLIPIDEMIA: ICD-10-CM

## 2025-05-08 DIAGNOSIS — M43.10 DEGENERATIVE SPONDYLOLISTHESIS: ICD-10-CM

## 2025-05-08 DIAGNOSIS — E66.09 CLASS 1 OBESITY DUE TO EXCESS CALORIES WITHOUT SERIOUS COMORBIDITY WITH BODY MASS INDEX (BMI) OF 31.0 TO 31.9 IN ADULT: ICD-10-CM

## 2025-05-08 DIAGNOSIS — E66.811 CLASS 1 OBESITY DUE TO EXCESS CALORIES WITHOUT SERIOUS COMORBIDITY WITH BODY MASS INDEX (BMI) OF 31.0 TO 31.9 IN ADULT: ICD-10-CM

## 2025-05-08 DIAGNOSIS — I10 ESSENTIAL HYPERTENSION WITH GOAL BLOOD PRESSURE LESS THAN 140/90: ICD-10-CM

## 2025-05-08 DIAGNOSIS — I10 ESSENTIAL HYPERTENSION WITH GOAL BLOOD PRESSURE LESS THAN 140/90: Primary | ICD-10-CM

## 2025-05-08 DIAGNOSIS — R73.03 PREDIABETES: ICD-10-CM

## 2025-05-08 LAB
ALBUMIN SERPL-MCNC: 4.1 G/DL (ref 3.2–4.8)
ALP LIVER SERPL-CCNC: 93 U/L (ref 45–117)
ALT SERPL-CCNC: 18 U/L (ref 10–49)
AST SERPL-CCNC: 21 U/L (ref ?–34)
BILIRUB DIRECT SERPL-MCNC: 0.3 MG/DL (ref ?–0.3)
BILIRUB SERPL-MCNC: 0.9 MG/DL (ref 0.2–1.1)
CHOLEST SERPL-MCNC: 98 MG/DL (ref ?–200)
FASTING PATIENT LIPID ANSWER: YES
HDLC SERPL-MCNC: 38 MG/DL (ref 40–59)
LDLC SERPL CALC-MCNC: 41 MG/DL (ref ?–100)
NONHDLC SERPL-MCNC: 60 MG/DL (ref ?–130)
PROT SERPL-MCNC: 6.9 G/DL (ref 5.7–8.2)
TRIGL SERPL-MCNC: 98 MG/DL (ref 30–149)
TSI SER-ACNC: 2 UIU/ML (ref 0.55–4.78)
VLDLC SERPL CALC-MCNC: 14 MG/DL (ref 0–30)

## 2025-05-08 PROCEDURE — 80061 LIPID PANEL: CPT

## 2025-05-08 PROCEDURE — 80076 HEPATIC FUNCTION PANEL: CPT

## 2025-05-08 PROCEDURE — 36415 COLL VENOUS BLD VENIPUNCTURE: CPT

## 2025-05-08 PROCEDURE — 84443 ASSAY THYROID STIM HORMONE: CPT

## 2025-05-08 NOTE — PROGRESS NOTES
Spoke to Enmanuel for CCM.      Updates to patient care team/comments: None   Patient reported changes in medications: None     Med Adherence  Comment: Pt confirmed he is taking medications as instructed by their providers.     Health Maintenance:   Health Maintenance   Topic Date Due    Zoster Vaccines (2 of 3) 11/30/2015    COVID-19 Vaccine (5 - 2024-25 season) 10/27/2025 (Originally 9/1/2024)    Influenza Vaccine (Season Ended) 10/01/2025    Lung Cancer Screening  04/03/2026    Colorectal Cancer Screening  08/13/2027    Annual Well Visit  Completed    Annual Depression Screening  Completed    Fall Risk Screening (Annual)  Completed    Pneumococcal Vaccine: 50+ Years  Completed    Meningococcal B Vaccine  Aged Out     Patient updates/concerns:    The patient reports that he is overall doing okay. He completed his annual physical exam. Plans on having pending labs drawn soon.     He continues to work on staying active, eating healthy and maintaining his back pain stable.     Goals/Action Plan:    Active goal from previous outreach:   Continue with  PT exercises and lose 5 lbs get down to 198 lbs          Where/when/how: By eating healthier, cutting back  Patient reported progress towards goals:                - What: He continues to work towards maintaining his goal.            - Where/When/How: The patient reports he walks daily, makes venkatesh effort to eat healthy and is keeping up with his preventative care.  Patient Reported Barriers and Concerns: None                   - Plan for overcoming barriers: N/a    Care Managers Interventions:     Nutrition and exercise counseling: I encouraged the patient to consume three balanced meals daily and discussed the importance of incorporating 20-30 minutes of daily exercise, including gentle stretches. To improve overall health.    Chat review and documentation: I thoroughly reviewed the patient chart and updated Care Everywhere.    Social determinants of health: I updated social  determinants of health to reflect any recent changes.    Patient support: I actively listened to the patient's concern's, provided emotional support and encouraged the patient to reach out, if he needs further assistance.     Reviewed Future Appointments:   Future Appointments   Date Time Provider Department Center   10/13/2025  5:00 PM Betito Garcia MD [facility]   Next Care Manager Follow Up Date: One month. Encouraged patient to call as needed.    Reason For Follow Up: review progress and or barriers towards patient's goals.     Time Spent This Encounter Total: 23 min medical record review, telephone communication, care plan updates where needed, education, goals, and action plan recreation/update. Provided acknowledgment and validation to patient's concerns.   Monthly Minute Total including today: 23 min  Physical assessment, complete health history, and need for CCM established by Betito Garcia MD.  Friendly reminder - 2025 Benefits Open Enrollment is here!   We encourage you to review your current Medicare coverage and explore your options during this period. We have developed a Medicare Information Page on our website to serve as a resource for guidance and answers to any questions you might have.   You can access it by visiting, [email or web address]

## 2025-07-02 ENCOUNTER — TELEPHONE (OUTPATIENT)
Dept: INTERNAL MEDICINE CLINIC | Facility: CLINIC | Age: 75
End: 2025-07-02

## 2025-07-02 NOTE — TELEPHONE ENCOUNTER
The patient states his back has been bothering him more lately this is a chronic pain. He is taking Tylenol 2 pills of 500 mg and it is not helping. He is asking if Dr. Garcia would prescribe something else he can take as needed. He mentioned bending down to put his socks on causes increased pain.

## 2025-07-02 NOTE — TELEPHONE ENCOUNTER
Spoke to patient, verified Name and . States pain is in the mid lower back. He had surgery on this 2 years ago, had spinal stenosis. He did physical therapy and even had a shot for a little bit. Reports that it got better, however, lately it has become more bothersome.     States he is able to walk (1 mile per day) but he cannot stand for a long time. Pain is more pronounced in the morning, somehow resolves as the day progresses. Movement such as bending over, putting socks on aggravates the pain. Sometimes pain is really bad but sometimes it is not. He has been taking Tylenol only when needed and when he does, medication is not helping much.     Patient is asking for a stronger medication he can take for pain as needed.     Last seen on  for physical.    Dr. Betito Garcia please advise.

## 2025-07-24 ENCOUNTER — PATIENT OUTREACH (OUTPATIENT)
Dept: CASE MANAGEMENT | Age: 75
End: 2025-07-24

## (undated) DIAGNOSIS — E66.09 CLASS 1 OBESITY DUE TO EXCESS CALORIES WITHOUT SERIOUS COMORBIDITY WITH BODY MASS INDEX (BMI) OF 31.0 TO 31.9 IN ADULT: ICD-10-CM

## (undated) DIAGNOSIS — E78.2 MIXED HYPERLIPIDEMIA: ICD-10-CM

## (undated) DIAGNOSIS — Z01.818 PREOP EXAMINATION: ICD-10-CM

## (undated) DIAGNOSIS — F17.210 CIGARETTE NICOTINE DEPENDENCE WITHOUT COMPLICATION: ICD-10-CM

## (undated) DIAGNOSIS — E66.3 OVERWEIGHT (BMI 25.0-29.9): ICD-10-CM

## (undated) DIAGNOSIS — I10 ESSENTIAL HYPERTENSION WITH GOAL BLOOD PRESSURE LESS THAN 140/90: ICD-10-CM

## (undated) DIAGNOSIS — M54.16 LUMBAR RADICULOPATHY: ICD-10-CM

## (undated) DEVICE — SPLINT ONESTEP 4X30IN FBRGLS MOLD

## (undated) DEVICE — SNARE CAPTIFLEX MICRO-OVL OLY

## (undated) DEVICE — 3M™ IOBAN™ 2 ANTIMICROBIAL INCISE DRAPE 6640EZ: Brand: IOBAN™ 2

## (undated) DEVICE — SYRINGE, LUER SLIP, STERILE, 60ML: Brand: MEDLINE

## (undated) DEVICE — SPONGE LAP 18X18IN WHT COT 4 PLY FLD STRUNG

## (undated) DEVICE — ENDOSCOPY PACK - LOWER: Brand: MEDLINE INDUSTRIES, INC.

## (undated) DEVICE — 35 ML SYRINGE REGULAR TIP: Brand: MONOJECT

## (undated) DEVICE — TRAP MCS 40ML 5IN PLS SCR CAP

## (undated) DEVICE — SNARE OPTMZ PLPCTM TRP

## (undated) DEVICE — SLING ORTH L16.5IN D7IN M DK BLU POLY COT ARM

## (undated) DEVICE — Device: Brand: CUSTOM PROCEDURE KIT

## (undated) DEVICE — C-ARM: Brand: UNBRANDED

## (undated) DEVICE — WEBRIL COTTON UNDERCAST PADDING: Brand: WEBRIL

## (undated) DEVICE — KIT ENDO ORCAPOD 160/180/190

## (undated) DEVICE — Device: Brand: DEFENDO AIR/WATER/SUCTION AND BIOPSY VALVE

## (undated) DEVICE — KIT CLEAN ENDOKIT 1.1OZ GOWNX2

## (undated) DEVICE — CO2 CANNULA,SSOFT,ADLT,7O2,4CO2,FEMALE: Brand: MEDLINE

## (undated) DEVICE — MATERIAL SPLNT 4X15IN POLY CMFRT PRE CUT

## (undated) DEVICE — Device

## (undated) DEVICE — PADDING,UNDERCAST,COTTON, 3X4YD STERILE: Brand: MEDLINE

## (undated) DEVICE — BNDG,ELSTC,MATRIX,STRL,4"X5YD,LF,HOOK&LP: Brand: MEDLINE

## (undated) DEVICE — SUT MCRYL 3-0 18IN PS-2 ABSRB UD 19MM 3/8 CIR

## (undated) DEVICE — GAMMEX® NON-LATEX PI ORTHO SIZE 8, STERILE POLYISOPRENE POWDER-FREE SURGICAL GLOVE: Brand: GAMMEX

## (undated) DEVICE — CLIP APPLIER: Brand: PREMIUM SURGICLIP III

## (undated) DEVICE — DISPOSABLE TOURNIQUET CUFF SINGLE BLADDER, DUAL PORT AND QUICK CONNECT CONNECTOR: Brand: COLOR CUFF

## (undated) DEVICE — ZZ-CONVERTED-TO-522442- SPONGE 4X4 10PK

## (undated) DEVICE — MEDI-VAC NON-CONDUCTIVE SUCTION TUBING 6MM X 1.8M (6FT.) L: Brand: CARDINAL HEALTH

## (undated) DEVICE — GAMMEX® NON-LATEX PI ORTHO SIZE 7.5, STERILE POLYISOPRENE POWDER-FREE SURGICAL GLOVE: Brand: GAMMEX

## (undated) DEVICE — OCCLUSIVE GAUZE STRIP,3% BISMUTH TRIBROMOPHENATE IN PETROLATUM BLEND: Brand: XEROFORM

## (undated) DEVICE — GAMMEX® PI HYBRID SIZE 7.5, STERILE POWDER-FREE SURGICAL GLOVE, POLYISOPRENE AND NEOPRENE BLEND: Brand: GAMMEX

## (undated) DEVICE — PACK CDS UPPER EXTREMITY

## (undated) DEVICE — LINE MNTR ADLT SET O2 INTMD

## (undated) DEVICE — DRESSING ANTIMIC 3.5 X 12 IN AQCEL AG ADVNTG

## (undated) DEVICE — X-RAY DETECTABLE SPONGES,16 PLY: Brand: VISTEC

## (undated) DEVICE — SLING ARM L L20IN D7IN DK BLU COT POLY WIDE

## (undated) DEVICE — SOLUTION IRRIG 1000ML 0.9% NACL USP BTL

## (undated) NOTE — LETTER
06/25/19    Berto Escalante  [address] [place] [address]      Dear Amol So: It was a pleasure speaking with you over the phone recently.  To follow up, I wanted to send you my contact information to utilize when you have a question and or need some assist

## (undated) NOTE — ED AVS SNAPSHOT
Arsh Walters   MRN: [id number]    Department:  [facility] Emergency Department   Date of Visit:  5/6/2018           Disclosure     Insurance plans vary and the physician(s) referred by the ER may not be covered by your plan.  Please contact your i within the next three months to obtain basic health screening including reassessment of your blood pressure.     IF THERE IS ANY CHANGE OR WORSENING OF YOUR CONDITION, CALL YOUR PRIMARY CARE PHYSICIAN AT ONCE OR RETURN IMMEDIATELY TO THE EMERGENCY DEPARTMEN

## (undated) NOTE — LETTER
[facility] SURGERY SCHEDULING FORM   [address] [place] [address] [place] [address]   [phone] (scheduling phone) [phone] (scheduling fax)     PATIENT INFORMATION   Last Name:      Guerda Ferguson      First Name:    Rena Miller []  No or using our own   Allergies: Patient has no known allergies.          Completed by:   Terrence Ellington      Date:   10/1/2018

## (undated) NOTE — LETTER
10/16/2023          To Whom It May Concern:    Juan Obregon is currently under my medical care  He is currently on blood pressure medication and his blood pressure today is 110/70. If you require additional information please contact our office.         Sincerely,    Demetrius Hernández MD          Document generated by:  Demetrius Hernández MD

## (undated) NOTE — LETTER
[facility]  Administration of Anesthesia  Enmanuel BENNETT agree to be cared for by a physician anesthesiologist alone and/or with a nurse anesthetist, who is specially trained to monitor me and give me medicine to put me to sleep or keep me comfortable during my procedure    I understand that my anesthesiologist and/or anesthetist is not an employee or agent of [facility] or [employer]. He or she works for [facility]    As the patient asking for anesthesia services, I agree to:  Allow the anesthesiologist (anesthesia doctor) to give me medicine and do additional procedures as necessary. Some examples are: Starting or using an “IV” to give me medicine, fluids or blood during my procedure, and having a breathing tube placed to help me breathe when I’m asleep (intubation). In the event that my heart stops working properly, I understand that my anesthesiologist will make every effort to sustain my life, unless otherwise directed by [facility] Do Not Resuscitate documents.  Tell my anesthesia doctor before my procedure:  If I am pregnant.  The last time that I ate or drank.  iii. All of the medicines I take (including prescriptions, herbal supplements, and pills I can buy without a prescription (including street drugs/illegal medications). Failure to inform my anesthesiologist about these medicines may increase my risk of anesthetic complications.  iv.If I am allergic to anything or have had a reaction to anesthesia before.  I understand how the anesthesia medicine will help me (benefits).  I understand that with any type of anesthesia medicine there are risks:  The most common risks are: nausea, vomiting, sore throat, muscle soreness, damage to my eyes, mouth, or teeth (from breathing tube placement).  Rare risks include: remembering what happened during my procedure, allergic reactions to medications, injury to my airway, heart, lungs, vision, nerves, or muscles  and in extremely rare instances death.  My doctor has explained to me other choices available to me for my care (alternatives).  Pregnant Patients (“epidural”):  I understand that the risks of having an epidural (medicine given into my back to help control pain during labor), include itching, low blood pressure, difficulty urinating, headache or slowing of the baby’s heart. Very rare risks include infection, bleeding, seizure, irregular heart rhythms and nerve injury.  Regional Anesthesia (“spinal”, “epidural”, & “nerve blocks”):  I understand that rare but potential complications include headache, bleeding, infection, seizure, irregular heart rhythms, and nerve injury.    _____________________________________________________________________________  Patient (or Representative) Signature/Relationship to Patient  Date   Time    _____________________________________________________________________________   Name (if used)    Language/Organization   Time    _____________________________________________________________________________  Nurse Anesthetist Signature     Date   Time  _____________________________________________________________________________  Anesthesiologist Signature     Date   Time  I have discussed the procedure and information above with the patient (or patient’s representative) and answered their questions. The patient or their representative has agreed to have anesthesia services.    _____________________________________________________________________________  Witness        Date   Time  I have verified that the signature is that of the patient or patient’s representative, and that it was signed before the procedure  Patient Name: Enmanuel Lara     : 1950                 Printed: 2024 at 8:30 AM    Medical Record #: [id number]                                            Page 1 of 1  ----------ANESTHESIA CONSENT----------

## (undated) NOTE — LETTER
[address], [facility]   Date:   5/10/2022     Name:   Michell Goldstein    YOB: 1950   MRN:   [id number]       WHERE IS YOUR PAIN NOW? Edis the areas on your body where you feel the described sensations. Use the appropriate symbol. Ryan Gross the areas of radiation. Include all affected areas. Just to complete the picture, please draw in the face. ACHE:  ^ ^ ^   NUMBNESS:  0000   PINS & NEEDLES:  = = = =                              ^ ^ ^                       0000              = = = =                                    ^ ^ ^                       0000            = = = =      BURNING:  XXXX   STABBING: ////                  XXXX                ////                         XXXX          ////     Please edis the line below indicating your degree of pain right now  with 0 being no pain 10 being the worst pain possible.                                          0             1             2              3             4              5              6              7             8             9             10         Patient Signature:

## (undated) NOTE — LETTER
4/5/2024    Enmanuel Lara        [address] [facility] [address]            Dear Enmanuel Lara,      Our records indicate that you are due for an appointment for a Colonoscopy with Estiven Alfonso MD. Our doctors are booking out about 3-6 months in advance for procedures.     Please call our office to schedule a phone screening appointment to plan for the procedure(s).   Your medical well-being is important to us.    If your insurance requires a referral, please call your primary care office to request one.      Thank you,      The Physicians and Staff at [facility]

## (undated) NOTE — LETTER
[facility] SURGERY SCHEDULING FORM   [address] [facility]   [phone] (scheduling phone) [phone] (scheduling fax)     PATIENT INFORMATION   Last Name:      Raj Hendrickson      First Name:    Grayson Francis Allergies: Patient has no known allergies.          Completed by:    Barb Luis      Date:    8/13/2018

## (undated) NOTE — LETTER
[address], [facility]   Date:   7/21/2022     Name:   Ingrid Estrella    YOB: 1950   MRN:   [id number]       WHERE IS YOUR PAIN NOW? Vikash the areas on your body where you feel the described sensations. Use the appropriate symbol. Jimbo Hung the areas of radiation. Include all affected areas. Just to complete the picture, please draw in the face. ACHE:  ^ ^ ^   NUMBNESS:  0000   PINS & NEEDLES:  = = = =                              ^ ^ ^                       0000              = = = =                                    ^ ^ ^                       0000            = = = =      BURNING:  XXXX   STABBING: ////                  XXXX                ////                         XXXX          ////     Please vikash the line below indicating your degree of pain right now  with 0 being no pain 10 being the worst pain possible.                                          0             1             2              3             4              5              6              7             8             9             10         Patient Signature:

## (undated) NOTE — LETTER
[facility]  Administration of Anesthesia  1. I, Lynnann Lefort, or _________________________________ acting on his behalf, (Patient) (Dependent/Representative) request to receive anesthesia for my pending procedure/operation/treatment.   A phys bleeding, seizure, cardiac arrest and death. 7. AWARENESS: I understand that it is possible (but unlikely) to have explicit memory of events from the operating room while under general anesthesia.   8. ELECTROCONVULSIVE THERAPY PATIENTS: This consent serve below affirms that prior to the time of the procedure, I have explained to the patient and/or his/her guardian, the risks and benefits of undergoing anesthesia, as well as any reasonable alternatives.     ___________________________________________________

## (undated) NOTE — MR AVS SNAPSHOT
[facility] SPECIALTY [facility] - [place] [address] [place]  [id number] [phone]               Thank you for choosing us for your health care visit with Elizabeth Ortiz MD.  We are glad to serve you and happy to provide you with this summar None      Allergies as of Jun 20, 2017     No Known Allergies                Today's Vital Signs     BP Pulse Height Weight BMI    102/69 mmHg 91 5' 8\" (1.727 m) 205 lb (92.987 kg) 31.18 kg/m2         Current Medications          This list is accurate as

## (undated) NOTE — LETTER
[address], [facility][1]   Date:   11/3/2022     Name:   Ke Almanza    YOB: 1950   MRN:   [id number]       WHERE IS YOUR PAIN NOW? Edis the areas on your body where you feel the described sensations. Use the appropriate symbol. Nena [place] the areas of radiation. Include all affected areas. Just to complete the picture, please draw in the face. ACHE:  ^ ^ ^   NUMBNESS:  0000   PINS & NEEDLES:  = = = =                              ^ ^ ^                       0000              = = = =                                    ^ ^ ^                       0000            = = = =      BURNING:  XXXX   STABBING: ////                  XXXX                ////                         XXXX          ////     Please edis the line below indicating your degree of pain right now  with 0 being no pain 10 being the worst pain possible.                                          0             1             2              3             4              5              6              7             8             9             10         Patient Signature:

## (undated) NOTE — LETTER
[place] [address], [place], [place]    Authorization for Surgical Operation and Procedure                               I hereby authorize Estiven Alfonso MD, my physician and his/her assistants (if applicable), which may include medical students, residents, and/or fellows, to perform the following surgical operation/ procedure and administer such anesthesia as may be determined necessary by my physician: Operation/Procedure name (s) COLONOSCOPY on Enmanuel Lara   2.   I recognize that during the surgical operation/procedure, unforeseen conditions may necessitate additional or different procedures than those listed above.  I, therefore, further authorize and request that the above-named surgeon, assistants, or designees perform such procedures as are, in their judgment, necessary and desirable.    3.   My surgeon/physician has discussed prior to my surgery the potential benefits, risks and side effects of this procedure; the likelihood of achieving goals; and potential problems that might occur during recuperation.  They also discussed reasonable alternatives to the procedure, including risks, benefits, and side effects related to the alternatives and risks related to not receiving this procedure.  I have had all my questions answered and I acknowledge that no guarantee has been made as to the result that may be obtained.    4.   Should the need arise during my operation/procedure, which includes change of level of care prior to discharge, I also consent to the administration of blood and/or blood products.  Further, I understand that despite careful testing and screening of blood or blood products by collecting agencies, I may still be subject to ill effects as a result of receiving a blood transfusion and/or blood products.  The following are some, but not all, of the potential risks that can occur: fever and allergic reactions, hemolytic reactions, transmission of diseases such as  Hepatitis, AIDS and Cytomegalovirus (CMV) and fluid overload.  In the event that I wish to have an autologous transfusion of my own blood, or a directed donor transfusion, I will discuss this with my physician.  Check only if Refusing Blood or Blood Products  I understand refusal of blood or blood products as deemed necessary by my physician may have serious consequences to my condition to include possible death. I hereby assume responsibility for my refusal and release the hospital, its personnel, and my physicians from any responsibility for the consequences of my refusal.    o  Refuse   5.   I authorize the use of any specimen, organs, tissues, body parts or foreign objects that may be removed from my body during the operation/procedure for diagnosis, research or teaching purposes and their subsequent disposal by hospital authorities.  I also authorize the release of specimen test results and/or written reports to my treating physician on the hospital medical staff or other referring or consulting physicians involved in my care, at the discretion of the Pathologist or my treating physician.    6.   I consent to the photographing or videotaping of the operations or procedures to be performed, including appropriate portions of my body for medical, scientific, or educational purposes, provided my identity is not revealed by the pictures or by descriptive texts accompanying them.  If the procedure has been photographed/videotaped, the surgeon will obtain the original picture, image, videotape or CD.  The hospital will not be responsible for storage, release or maintenance of the picture, image, tape or CD.    7.   I consent to the presence of a  or observers in the operating room as deemed necessary by my physician or their designees.    8.   I recognize that in the event my procedure results in extended X-Ray/fluoroscopy time, I may develop a skin reaction.    9. If I have a Do Not Attempt  Resuscitation (DNAR) order in place, that status will be suspended while in the operating room, procedural suite, and during the recovery period unless otherwise explicitly stated by me (or a person authorized to consent on my behalf). The surgeon or my attending physician will determine when the applicable recovery period ends for purposes of reinstating the DNAR order.  10. Patients having a sterilization procedure: I understand that if the procedure is successful the results will be permanent and it will therefore be impossible for me to inseminate, conceive, or bear children.  I also understand that the procedure is intended to result in sterility, although the result has not been guaranteed.   11. I acknowledge that my physician has explained sedation/analgesia administration to me including the risk and benefits I consent to the administration of sedation/analgesia as may be necessary or desirable in the judgment of my physician.    I CERTIFY THAT I HAVE READ AND FULLY UNDERSTAND THE ABOVE CONSENT TO OPERATION and/or OTHER PROCEDURE.     ____________________________________  _________________________________        ______________________________  Signature of Patient    Signature of Responsible Person                Printed Name of Responsible Person                                      ____________________________________  _____________________________                ________________________________  Signature of Witness        Date  Time         Relationship to Patient    STATEMENT OF PHYSICIAN My signature below affirms that prior to the time of the procedure; I have explained to the patient and/or his/her legal representative, the risks and benefits involved in the proposed treatment and any reasonable alternative to the proposed treatment. I have also explained the risks and benefits involved in refusal of the proposed treatment and alternatives to the proposed treatment and have answered the patient's  questions. If I have a significant financial interest in a co-management agreement or a significant financial interest in any product or implant, or other significant relationship used in this procedure/surgery, I have disclosed this and had a discussion with my patient.     _____________________________________________________              _____________________________  (Signature of Physician)                                                                                         (Date)                                   (Time)  Patient Name: Enmanuel Lara      : 1950      Printed: 2024     Medical Record #: [id number]                                      Page 1 of 1

## (undated) NOTE — Clinical Note
Transitional Care Management call completed. A Transitional Care Management appointment is scheduled for 12/10/224. Thank you.

## (undated) NOTE — MR AVS SNAPSHOT
[facility] SPECIALTY [facility] - [place] [address] [place]  [id number] [phone]               Thank you for choosing us for your health care visit with Adilson Vargas MD.  We are glad to serve you and happy to provide you with this summar [employer] will allow you to access patient instructions from your recent visit,  view other health information, and more. To sign up or find more information, go to [email or web address] and click on the Sign Up Now link in the [employer] box.      Enter 2 ½ hours per week – spread out over time Use a jeff to keep you motivated   Don’t forget strength training with weights and resistance Set goals and track your progress   You don’t need to join a gym. Home exercises work great.  Put more priority on exe

## (undated) NOTE — LETTER
[address], [facility]   Date:   11/9/2021     Name:   Eric Kilpatrick    YOB: 1950   MRN:   [id number]       WHERE IS YOUR PAIN NOW?   Chris Show the areas on your body where you feel the described

## (undated) NOTE — LETTER
[address], [facility]   Date:   3/15/2022     Name:   Antonieta Cassidy    YOB: 1950   MRN:   [id number]       WHERE IS YOUR PAIN NOW? Edis the areas on your body where you feel the described sensations. Use the appropriate symbol. Сергей [place] the areas of radiation. Include all affected areas. Just to complete the picture, please draw in the face. ACHE:  ^ ^ ^   NUMBNESS:  0000   PINS & NEEDLES:  = = = =                              ^ ^ ^                       0000              = = = =                                    ^ ^ ^                       0000            = = = =      BURNING:  XXXX   STABBING: ////                  XXXX                ////                         XXXX          ////     Please edis the line below indicating your degree of pain right now  with 0 being no pain 10 being the worst pain possible.                                          0             1             2              3             4              5              6              7             8             9             10         Patient Signature:

## (undated) NOTE — LETTER
[address], [place]  Authorization for Invasive Procedures  1.  I hereby authorize Dr. Winston Sanchez , my physician and whomever may be designated as the doctor's assistant, to perform the following operation and/or procedu and allergic reactions, hemolytic reactions, transmission of disease such as hepatitis, AIDS, cytomegalovirus (CMV), and flluid overload.  In the event that I wish to have autologous transfusions of my own blood, or a directed donor transfusion, I will disc Signature of Patient:  ________________________________________________ Date: _________Time: _________    Responsible person in case of minor or unconscious: _____________________________Relationship: ____________     Witness Signature: ___________________

## (undated) NOTE — LETTER
[address] [facility]   Date:   10/6/2022     Name:   Alivia Velásquez    YOB: 1950   MRN:   [id number]       WHERE IS YOUR PAIN NOW? Vikash the areas on your body where you feel the described sensations. Use the appropriate symbol. Casie Khan the areas of radiation. Include all affected areas. Just to complete the picture, please draw in the face. ACHE:  ^ ^ ^   NUMBNESS:  0000   PINS & NEEDLES:  = = = =                              ^ ^ ^                       0000              = = = =                                    ^ ^ ^                       0000            = = = =      BURNING:  XXXX   STABBING: ////                  XXXX                ////                         XXXX          ////     Please vikash the line below indicating your degree of pain right now  with 0 being no pain 10 being the worst pain possible.                                          0             1             2              3             4              5              6              7             8             9             10         Patient Signature:

## (undated) NOTE — LETTER
10/25/18        Roger Alcala  [address]      Dear Shaun Arias,    [address] [facility] records indicate that you have outstanding lab work and or testing that was ordered for you and has not yet been completed:  Orders Placed This Encounter      SHAMEKA TEIXEIRA